# Patient Record
Sex: FEMALE | Race: OTHER | Employment: FULL TIME | URBAN - NONMETROPOLITAN AREA
[De-identification: names, ages, dates, MRNs, and addresses within clinical notes are randomized per-mention and may not be internally consistent; named-entity substitution may affect disease eponyms.]

---

## 2017-04-24 ENCOUNTER — OFFICE VISIT (OUTPATIENT)
Dept: FAMILY MEDICINE CLINIC | Age: 27
End: 2017-04-24
Payer: COMMERCIAL

## 2017-04-24 VITALS
WEIGHT: 293 LBS | DIASTOLIC BLOOD PRESSURE: 72 MMHG | HEART RATE: 78 BPM | SYSTOLIC BLOOD PRESSURE: 128 MMHG | HEIGHT: 67 IN | BODY MASS INDEX: 45.99 KG/M2

## 2017-04-24 VITALS
WEIGHT: 282 LBS | HEIGHT: 67 IN | SYSTOLIC BLOOD PRESSURE: 128 MMHG | BODY MASS INDEX: 44.26 KG/M2 | HEART RATE: 76 BPM | DIASTOLIC BLOOD PRESSURE: 70 MMHG

## 2017-04-24 DIAGNOSIS — N80.9 ENDOMETRIOSIS, SITE UNSPECIFIED: ICD-10-CM

## 2017-04-24 DIAGNOSIS — F41.9 ANXIETY: Primary | ICD-10-CM

## 2017-04-24 DIAGNOSIS — K21.9 GASTROESOPHAGEAL REFLUX DISEASE WITHOUT ESOPHAGITIS: ICD-10-CM

## 2017-04-24 DIAGNOSIS — E28.2 POLYCYSTIC OVARIES: ICD-10-CM

## 2017-04-24 PROBLEM — E66.09 OBESITY DUE TO EXCESS CALORIES: Status: ACTIVE | Noted: 2017-04-24

## 2017-04-24 PROBLEM — M54.50 LUMBAGO: Status: ACTIVE | Noted: 2017-04-24

## 2017-04-24 PROCEDURE — G8417 CALC BMI ABV UP PARAM F/U: HCPCS | Performed by: NURSE PRACTITIONER

## 2017-04-24 PROCEDURE — 99214 OFFICE O/P EST MOD 30 MIN: CPT | Performed by: NURSE PRACTITIONER

## 2017-04-24 PROCEDURE — G8427 DOCREV CUR MEDS BY ELIG CLIN: HCPCS | Performed by: NURSE PRACTITIONER

## 2017-04-24 PROCEDURE — 4004F PT TOBACCO SCREEN RCVD TLK: CPT | Performed by: NURSE PRACTITIONER

## 2017-04-24 RX ORDER — MECLOFENAMATE SODIUM 100 MG/1
100 CAPSULE ORAL PRN
COMMUNITY
Start: 2017-04-01 | End: 2019-08-27 | Stop reason: ALTCHOICE

## 2017-04-24 RX ORDER — HYDROCODONE BITARTRATE AND ACETAMINOPHEN 5; 325 MG/1; MG/1
TABLET ORAL
COMMUNITY
Start: 2017-03-08 | End: 2017-06-06

## 2017-04-24 RX ORDER — NAPROXEN 500 MG/1
500 TABLET ORAL 2 TIMES DAILY WITH MEALS
COMMUNITY
End: 2017-04-24 | Stop reason: ALTCHOICE

## 2017-04-24 RX ORDER — CYCLOBENZAPRINE HCL 10 MG
TABLET ORAL
COMMUNITY
Start: 2017-03-08 | End: 2017-04-24

## 2017-04-24 RX ORDER — CALCIUM CARBONATE 200(500)MG
1 TABLET,CHEWABLE ORAL 3 TIMES DAILY
COMMUNITY
End: 2017-06-06

## 2017-04-24 RX ORDER — DICYCLOMINE HCL 20 MG
TABLET ORAL
Status: ON HOLD | COMMUNITY
Start: 2017-02-27 | End: 2018-07-18 | Stop reason: HOSPADM

## 2017-04-24 RX ORDER — FLUOXETINE HYDROCHLORIDE 20 MG/1
20 CAPSULE ORAL DAILY
Qty: 30 CAPSULE | Refills: 1 | Status: SHIPPED | OUTPATIENT
Start: 2017-04-24 | End: 2017-07-05 | Stop reason: SDUPTHER

## 2017-04-24 RX ORDER — TIZANIDINE 4 MG/1
TABLET ORAL
COMMUNITY
Start: 2017-04-01 | End: 2017-09-20 | Stop reason: ALTCHOICE

## 2017-04-24 RX ORDER — OMEPRAZOLE 20 MG/1
20 CAPSULE, DELAYED RELEASE ORAL DAILY
Qty: 30 CAPSULE | Refills: 5 | Status: SHIPPED | OUTPATIENT
Start: 2017-04-24 | End: 2017-12-16 | Stop reason: SDUPTHER

## 2017-04-24 RX ORDER — LORATADINE 10 MG/1
10 TABLET ORAL DAILY
COMMUNITY
End: 2017-06-06 | Stop reason: SDUPTHER

## 2017-04-24 RX ORDER — BENZONATATE 100 MG/1
100 CAPSULE ORAL 3 TIMES DAILY PRN
COMMUNITY
End: 2017-06-06

## 2017-04-24 RX ORDER — MEGESTROL ACETATE 40 MG/1
40 TABLET ORAL PRN
COMMUNITY
Start: 2017-04-01 | End: 2019-08-27 | Stop reason: ALTCHOICE

## 2017-04-24 ASSESSMENT — ENCOUNTER SYMPTOMS
NAUSEA: 1
SHORTNESS OF BREATH: 0
WHEEZING: 0
DIARRHEA: 0
CONSTIPATION: 0
COUGH: 1

## 2017-06-06 ENCOUNTER — OFFICE VISIT (OUTPATIENT)
Dept: FAMILY MEDICINE CLINIC | Age: 27
End: 2017-06-06
Payer: COMMERCIAL

## 2017-06-06 VITALS
WEIGHT: 288 LBS | SYSTOLIC BLOOD PRESSURE: 126 MMHG | BODY MASS INDEX: 45.2 KG/M2 | HEART RATE: 88 BPM | HEIGHT: 67 IN | DIASTOLIC BLOOD PRESSURE: 80 MMHG

## 2017-06-06 DIAGNOSIS — R53.83 FATIGUE, UNSPECIFIED TYPE: ICD-10-CM

## 2017-06-06 DIAGNOSIS — F41.9 ANXIETY: Primary | ICD-10-CM

## 2017-06-06 DIAGNOSIS — K21.9 GASTROESOPHAGEAL REFLUX DISEASE WITHOUT ESOPHAGITIS: ICD-10-CM

## 2017-06-06 DIAGNOSIS — R63.1 POLYDIPSIA: ICD-10-CM

## 2017-06-06 DIAGNOSIS — R63.5 WEIGHT GAIN: ICD-10-CM

## 2017-06-06 DIAGNOSIS — G89.29 CHRONIC BILATERAL LOW BACK PAIN WITHOUT SCIATICA: ICD-10-CM

## 2017-06-06 DIAGNOSIS — M54.50 CHRONIC BILATERAL LOW BACK PAIN WITHOUT SCIATICA: ICD-10-CM

## 2017-06-06 LAB
BASOPHILS ABSOLUTE: 0.14 /ΜL
BASOPHILS RELATIVE PERCENT: 1.98 %
EOSINOPHILS ABSOLUTE: 0.37 /ΜL
EOSINOPHILS RELATIVE PERCENT: 5.12 %
HCT VFR BLD CALC: 47.6 % (ref 36–46)
HEMOGLOBIN: 15.8 G/DL (ref 12–16)
LYMPHOCYTES ABSOLUTE: 3.03 /ΜL
LYMPHOCYTES RELATIVE PERCENT: 42 %
MCH RBC QN AUTO: 31.3 PG
MCHC RBC AUTO-ENTMCNC: 33.3 G/DL
MCV RBC AUTO: 94.1 FL
MONOCYTES ABSOLUTE: 0.46 /ΜL
MONOCYTES RELATIVE PERCENT: 6.4 %
NEUTROPHILS ABSOLUTE: 3.21 /ΜL
NEUTROPHILS RELATIVE PERCENT: 44.5 %
PDW BLD-RTO: 12.3 %
PLATELET # BLD: 401 K/ΜL
PMV BLD AUTO: 6.4 FL
RBC # BLD: 5.06 10^6/ΜL
WBC # BLD: 7.22 10^3/ML

## 2017-06-06 PROCEDURE — 4004F PT TOBACCO SCREEN RCVD TLK: CPT | Performed by: NURSE PRACTITIONER

## 2017-06-06 PROCEDURE — G8427 DOCREV CUR MEDS BY ELIG CLIN: HCPCS | Performed by: NURSE PRACTITIONER

## 2017-06-06 PROCEDURE — G8417 CALC BMI ABV UP PARAM F/U: HCPCS | Performed by: NURSE PRACTITIONER

## 2017-06-06 PROCEDURE — 99214 OFFICE O/P EST MOD 30 MIN: CPT | Performed by: NURSE PRACTITIONER

## 2017-06-06 RX ORDER — BUSPIRONE HYDROCHLORIDE 7.5 MG/1
7.5 TABLET ORAL 2 TIMES DAILY
Qty: 60 TABLET | Refills: 1 | Status: SHIPPED | OUTPATIENT
Start: 2017-06-06 | End: 2017-10-30 | Stop reason: SDUPTHER

## 2017-06-06 RX ORDER — MONTELUKAST SODIUM 10 MG/1
10 TABLET ORAL NIGHTLY
COMMUNITY
End: 2017-06-06 | Stop reason: SDUPTHER

## 2017-06-06 RX ORDER — MONTELUKAST SODIUM 10 MG/1
10 TABLET ORAL NIGHTLY
Qty: 30 TABLET | Refills: 5 | Status: SHIPPED | OUTPATIENT
Start: 2017-06-06 | End: 2018-06-12 | Stop reason: SDUPTHER

## 2017-06-06 RX ORDER — LORATADINE 10 MG/1
10 TABLET ORAL DAILY
Qty: 30 TABLET | Refills: 5 | Status: SHIPPED | OUTPATIENT
Start: 2017-06-06 | End: 2018-06-11 | Stop reason: SDUPTHER

## 2017-06-06 RX ORDER — TOPIRAMATE 25 MG/1
25 TABLET ORAL DAILY
COMMUNITY
End: 2017-10-30 | Stop reason: DRUGHIGH

## 2017-06-06 ASSESSMENT — PATIENT HEALTH QUESTIONNAIRE - PHQ9
SUM OF ALL RESPONSES TO PHQ QUESTIONS 1-9: 2
2. FEELING DOWN, DEPRESSED OR HOPELESS: 1
1. LITTLE INTEREST OR PLEASURE IN DOING THINGS: 1
SUM OF ALL RESPONSES TO PHQ9 QUESTIONS 1 & 2: 2

## 2017-06-06 ASSESSMENT — ENCOUNTER SYMPTOMS
CHANGE IN BOWEL HABIT: 0
WHEEZING: 0
ABDOMINAL PAIN: 0
SHORTNESS OF BREATH: 0
DIARRHEA: 0
VOMITING: 0
VISUAL CHANGE: 0
SWOLLEN GLANDS: 0
COUGH: 0
CONSTIPATION: 0
SORE THROAT: 0

## 2017-06-08 DIAGNOSIS — R63.5 WEIGHT GAIN: ICD-10-CM

## 2017-06-08 DIAGNOSIS — R63.1 POLYDIPSIA: ICD-10-CM

## 2017-06-08 DIAGNOSIS — R53.83 FATIGUE, UNSPECIFIED TYPE: ICD-10-CM

## 2017-06-08 ASSESSMENT — ENCOUNTER SYMPTOMS
HEARTBURN: 1
NAUSEA: 1
HOARSE VOICE: 0
BELCHING: 0

## 2017-07-05 ENCOUNTER — OFFICE VISIT (OUTPATIENT)
Dept: FAMILY MEDICINE CLINIC | Age: 27
End: 2017-07-05
Payer: COMMERCIAL

## 2017-07-05 VITALS
BODY MASS INDEX: 44.6 KG/M2 | DIASTOLIC BLOOD PRESSURE: 64 MMHG | HEART RATE: 72 BPM | SYSTOLIC BLOOD PRESSURE: 118 MMHG | HEIGHT: 67 IN | WEIGHT: 284.13 LBS

## 2017-07-05 DIAGNOSIS — F41.8 DEPRESSION WITH ANXIETY: Primary | ICD-10-CM

## 2017-07-05 PROCEDURE — 99214 OFFICE O/P EST MOD 30 MIN: CPT | Performed by: NURSE PRACTITIONER

## 2017-07-05 PROCEDURE — G8417 CALC BMI ABV UP PARAM F/U: HCPCS | Performed by: NURSE PRACTITIONER

## 2017-07-05 PROCEDURE — G8427 DOCREV CUR MEDS BY ELIG CLIN: HCPCS | Performed by: NURSE PRACTITIONER

## 2017-07-05 PROCEDURE — 4004F PT TOBACCO SCREEN RCVD TLK: CPT | Performed by: NURSE PRACTITIONER

## 2017-07-05 RX ORDER — FLUOXETINE HYDROCHLORIDE 40 MG/1
40 CAPSULE ORAL DAILY
Qty: 30 CAPSULE | Refills: 2 | Status: SHIPPED | OUTPATIENT
Start: 2017-07-05 | End: 2017-11-13 | Stop reason: SDUPTHER

## 2017-07-05 ASSESSMENT — ENCOUNTER SYMPTOMS
CONSTIPATION: 0
DIARRHEA: 0
SHORTNESS OF BREATH: 0

## 2017-07-08 ASSESSMENT — ENCOUNTER SYMPTOMS
ABDOMINAL PAIN: 0
VISUAL CHANGE: 0

## 2017-07-18 ENCOUNTER — OFFICE VISIT (OUTPATIENT)
Dept: FAMILY MEDICINE CLINIC | Age: 27
End: 2017-07-18
Payer: COMMERCIAL

## 2017-07-18 VITALS
HEART RATE: 72 BPM | DIASTOLIC BLOOD PRESSURE: 64 MMHG | BODY MASS INDEX: 44.6 KG/M2 | HEIGHT: 67 IN | WEIGHT: 284.13 LBS | SYSTOLIC BLOOD PRESSURE: 122 MMHG

## 2017-07-18 DIAGNOSIS — E28.2 POLYCYSTIC OVARIES: ICD-10-CM

## 2017-07-18 DIAGNOSIS — F41.8 DEPRESSION WITH ANXIETY: ICD-10-CM

## 2017-07-18 DIAGNOSIS — N94.6 DYSMENORRHEA: ICD-10-CM

## 2017-07-18 DIAGNOSIS — N80.9 ENDOMETRIOSIS, SITE UNSPECIFIED: ICD-10-CM

## 2017-07-18 DIAGNOSIS — N93.8 DYSFUNCTIONAL UTERINE BLEEDING: Primary | ICD-10-CM

## 2017-07-18 PROCEDURE — G8417 CALC BMI ABV UP PARAM F/U: HCPCS | Performed by: NURSE PRACTITIONER

## 2017-07-18 PROCEDURE — G8427 DOCREV CUR MEDS BY ELIG CLIN: HCPCS | Performed by: NURSE PRACTITIONER

## 2017-07-18 PROCEDURE — 99213 OFFICE O/P EST LOW 20 MIN: CPT | Performed by: NURSE PRACTITIONER

## 2017-07-18 PROCEDURE — 4004F PT TOBACCO SCREEN RCVD TLK: CPT | Performed by: NURSE PRACTITIONER

## 2017-07-18 ASSESSMENT — ENCOUNTER SYMPTOMS
WHEEZING: 0
ABDOMINAL PAIN: 1
DIARRHEA: 1
COUGH: 0
VOMITING: 1
NAUSEA: 1
CONSTIPATION: 1
SHORTNESS OF BREATH: 0

## 2017-08-03 ENCOUNTER — TELEPHONE (OUTPATIENT)
Dept: FAMILY MEDICINE CLINIC | Age: 27
End: 2017-08-03

## 2017-09-20 ENCOUNTER — OFFICE VISIT (OUTPATIENT)
Dept: FAMILY MEDICINE CLINIC | Age: 27
End: 2017-09-20
Payer: COMMERCIAL

## 2017-09-20 VITALS
DIASTOLIC BLOOD PRESSURE: 86 MMHG | HEART RATE: 92 BPM | SYSTOLIC BLOOD PRESSURE: 124 MMHG | OXYGEN SATURATION: 98 % | RESPIRATION RATE: 16 BRPM | BODY MASS INDEX: 44.67 KG/M2 | WEIGHT: 284.6 LBS | HEIGHT: 67 IN | TEMPERATURE: 98.6 F

## 2017-09-20 DIAGNOSIS — J40 BRONCHITIS: Primary | ICD-10-CM

## 2017-09-20 PROCEDURE — 99213 OFFICE O/P EST LOW 20 MIN: CPT | Performed by: NURSE PRACTITIONER

## 2017-09-20 RX ORDER — PREDNISONE 20 MG/1
20 TABLET ORAL DAILY
Qty: 7 TABLET | Refills: 0 | Status: SHIPPED | OUTPATIENT
Start: 2017-09-20 | End: 2017-09-27

## 2017-09-20 RX ORDER — BENZONATATE 100 MG/1
100 CAPSULE ORAL 3 TIMES DAILY PRN
Qty: 21 CAPSULE | Refills: 1 | Status: SHIPPED | OUTPATIENT
Start: 2017-09-20 | End: 2018-02-14 | Stop reason: ALTCHOICE

## 2017-09-20 RX ORDER — CEPHALEXIN 500 MG/1
500 CAPSULE ORAL 3 TIMES DAILY
Qty: 30 CAPSULE | Refills: 0 | Status: SHIPPED | OUTPATIENT
Start: 2017-09-20 | End: 2017-09-30

## 2017-09-20 ASSESSMENT — ENCOUNTER SYMPTOMS
COUGH: 1
WHEEZING: 1
NAUSEA: 0
SINUS PAIN: 1
DIARRHEA: 0
RHINORRHEA: 1
ABDOMINAL PAIN: 0
SORE THROAT: 1
VOMITING: 1
SHORTNESS OF BREATH: 0

## 2017-10-30 ENCOUNTER — OFFICE VISIT (OUTPATIENT)
Dept: FAMILY MEDICINE CLINIC | Age: 27
End: 2017-10-30
Payer: COMMERCIAL

## 2017-10-30 VITALS
OXYGEN SATURATION: 98 % | HEIGHT: 67 IN | BODY MASS INDEX: 44.67 KG/M2 | WEIGHT: 284.61 LBS | SYSTOLIC BLOOD PRESSURE: 128 MMHG | DIASTOLIC BLOOD PRESSURE: 82 MMHG | RESPIRATION RATE: 16 BRPM | HEART RATE: 86 BPM

## 2017-10-30 DIAGNOSIS — F41.9 ANXIETY: ICD-10-CM

## 2017-10-30 DIAGNOSIS — M54.42 CHRONIC BILATERAL LOW BACK PAIN WITH LEFT-SIDED SCIATICA: Primary | ICD-10-CM

## 2017-10-30 DIAGNOSIS — M79.675 PAIN OF TOE OF LEFT FOOT: ICD-10-CM

## 2017-10-30 DIAGNOSIS — G89.29 CHRONIC BILATERAL LOW BACK PAIN WITH LEFT-SIDED SCIATICA: Primary | ICD-10-CM

## 2017-10-30 LAB
INR BLD: 1 RATIO
PT: 11.9 SEC
PTT: 25 SEC

## 2017-10-30 PROCEDURE — G8427 DOCREV CUR MEDS BY ELIG CLIN: HCPCS | Performed by: NURSE PRACTITIONER

## 2017-10-30 PROCEDURE — G8484 FLU IMMUNIZE NO ADMIN: HCPCS | Performed by: NURSE PRACTITIONER

## 2017-10-30 PROCEDURE — 99213 OFFICE O/P EST LOW 20 MIN: CPT | Performed by: NURSE PRACTITIONER

## 2017-10-30 PROCEDURE — G8417 CALC BMI ABV UP PARAM F/U: HCPCS | Performed by: NURSE PRACTITIONER

## 2017-10-30 PROCEDURE — 1036F TOBACCO NON-USER: CPT | Performed by: NURSE PRACTITIONER

## 2017-10-30 RX ORDER — LORAZEPAM 1 MG/1
1 TABLET ORAL DAILY
Refills: 0 | COMMUNITY
Start: 2017-10-14 | End: 2018-02-14 | Stop reason: ALTCHOICE

## 2017-10-30 RX ORDER — TOPIRAMATE 50 MG/1
1 TABLET, FILM COATED ORAL 2 TIMES DAILY
Refills: 0 | COMMUNITY
Start: 2017-10-14 | End: 2018-09-12 | Stop reason: SDUPTHER

## 2017-10-30 RX ORDER — TIZANIDINE 4 MG/1
1 TABLET ORAL 2 TIMES DAILY
Refills: 0 | COMMUNITY
Start: 2017-10-14 | End: 2020-04-09 | Stop reason: SDUPTHER

## 2017-10-30 RX ORDER — BUSPIRONE HYDROCHLORIDE 15 MG/1
15 TABLET ORAL 2 TIMES DAILY
Qty: 60 TABLET | Refills: 2 | Status: SHIPPED | OUTPATIENT
Start: 2017-10-30 | End: 2018-04-26

## 2017-10-30 ASSESSMENT — ENCOUNTER SYMPTOMS
EYES NEGATIVE: 1
COUGH: 0
WHEEZING: 0
ABDOMINAL PAIN: 0
CONSTIPATION: 0
SHORTNESS OF BREATH: 0
BACK PAIN: 1
DIARRHEA: 0

## 2017-10-30 NOTE — PATIENT INSTRUCTIONS

## 2017-11-02 NOTE — PROGRESS NOTES
Left message to return call.
tablet by mouth 2 times daily     Dispense:  60 tablet     Refill:  2      Return if symptoms worsen or fail to improve. Patient given educational materials - see patient instructions. Discussed use, benefit, and side effects of prescribed medications. All patient questions answered. Pt voiced understanding. Health Maintenance reviewed - refused flu vaccine. Instructed to continue current medications, diet and exercise. Patient agreed with treatment plan. Follow up as directed.      Electronically signed by Autumn Benavides CNP on 10/30/2017

## 2017-11-09 ENCOUNTER — OFFICE VISIT (OUTPATIENT)
Dept: FAMILY MEDICINE CLINIC | Age: 27
End: 2017-11-09
Payer: COMMERCIAL

## 2017-11-09 VITALS
DIASTOLIC BLOOD PRESSURE: 84 MMHG | WEIGHT: 280 LBS | HEART RATE: 100 BPM | OXYGEN SATURATION: 98 % | RESPIRATION RATE: 16 BRPM | TEMPERATURE: 99.7 F | BODY MASS INDEX: 43.84 KG/M2 | SYSTOLIC BLOOD PRESSURE: 126 MMHG

## 2017-11-09 DIAGNOSIS — J06.9 UPPER RESPIRATORY TRACT INFECTION, UNSPECIFIED TYPE: ICD-10-CM

## 2017-11-09 DIAGNOSIS — R05.3 COUGH, PERSISTENT: Primary | ICD-10-CM

## 2017-11-09 PROCEDURE — G8427 DOCREV CUR MEDS BY ELIG CLIN: HCPCS | Performed by: NURSE PRACTITIONER

## 2017-11-09 PROCEDURE — G8484 FLU IMMUNIZE NO ADMIN: HCPCS | Performed by: NURSE PRACTITIONER

## 2017-11-09 PROCEDURE — 1036F TOBACCO NON-USER: CPT | Performed by: NURSE PRACTITIONER

## 2017-11-09 PROCEDURE — G8417 CALC BMI ABV UP PARAM F/U: HCPCS | Performed by: NURSE PRACTITIONER

## 2017-11-09 PROCEDURE — 99213 OFFICE O/P EST LOW 20 MIN: CPT | Performed by: NURSE PRACTITIONER

## 2017-11-09 RX ORDER — AMOXICILLIN AND CLAVULANATE POTASSIUM 500; 125 MG/1; MG/1
1 TABLET, FILM COATED ORAL 3 TIMES DAILY
Refills: 0 | COMMUNITY
Start: 2017-11-01 | End: 2018-02-14 | Stop reason: ALTCHOICE

## 2017-11-09 RX ORDER — PHENAZOPYRIDINE HYDROCHLORIDE 200 MG/1
1 TABLET, FILM COATED ORAL 2 TIMES DAILY
Refills: 0 | COMMUNITY
Start: 2017-10-30 | End: 2018-02-14 | Stop reason: ALTCHOICE

## 2017-11-09 ASSESSMENT — ENCOUNTER SYMPTOMS
SORE THROAT: 0
SINUS PRESSURE: 0
HEARTBURN: 0
WHEEZING: 0
COUGH: 1
RHINORRHEA: 0
SHORTNESS OF BREATH: 0
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
## 2017-11-13 RX ORDER — FLUOXETINE HYDROCHLORIDE 40 MG/1
CAPSULE ORAL
Qty: 30 CAPSULE | Refills: 2 | Status: SHIPPED | OUTPATIENT
Start: 2017-11-13 | End: 2018-05-15

## 2017-11-13 NOTE — TELEPHONE ENCOUNTER
Kathryn Gastelum is calling to request a refill on the following medication(s):  Requested Prescriptions     Pending Prescriptions Disp Refills    FLUoxetine (PROZAC) 40 MG capsule [Pharmacy Med Name: FLUOXETINE HCL 40 MG CAPSULE] 30 capsule 2     Sig: take 1 capsule by mouth once daily       Last Visit Date (If Applicable):  52/2/0821    Next Visit Date:    Visit date not found

## 2017-12-18 RX ORDER — OMEPRAZOLE 20 MG/1
CAPSULE, DELAYED RELEASE ORAL
Qty: 30 CAPSULE | Refills: 5 | Status: SHIPPED | OUTPATIENT
Start: 2017-12-18 | End: 2018-10-11 | Stop reason: SDUPTHER

## 2017-12-18 NOTE — TELEPHONE ENCOUNTER
Carrie Meeks is calling to request a refill on the following medication(s):  Requested Prescriptions     Pending Prescriptions Disp Refills    omeprazole (PRILOSEC) 20 MG delayed release capsule [Pharmacy Med Name: OMEPRAZOLE DR 20 MG CAPSULE] 30 capsule 5     Sig: take 1 capsule by mouth once daily       Last Visit Date (If Applicable):  44/9/5308    Next Visit Date:    Visit date not found

## 2018-02-14 ENCOUNTER — OFFICE VISIT (OUTPATIENT)
Dept: FAMILY MEDICINE CLINIC | Age: 28
End: 2018-02-14
Payer: COMMERCIAL

## 2018-02-14 VITALS
RESPIRATION RATE: 12 BRPM | BODY MASS INDEX: 45.49 KG/M2 | HEART RATE: 65 BPM | OXYGEN SATURATION: 98 % | SYSTOLIC BLOOD PRESSURE: 124 MMHG | DIASTOLIC BLOOD PRESSURE: 82 MMHG | TEMPERATURE: 100.2 F | WEIGHT: 290.5 LBS

## 2018-02-14 DIAGNOSIS — L23.9 ALLERGIC CONTACT DERMATITIS, UNSPECIFIED TRIGGER: ICD-10-CM

## 2018-02-14 DIAGNOSIS — J02.9 SORE THROAT: ICD-10-CM

## 2018-02-14 DIAGNOSIS — H66.001 ACUTE SUPPURATIVE OTITIS MEDIA OF RIGHT EAR WITHOUT SPONTANEOUS RUPTURE OF TYMPANIC MEMBRANE, RECURRENCE NOT SPECIFIED: Primary | ICD-10-CM

## 2018-02-14 PROCEDURE — 99213 OFFICE O/P EST LOW 20 MIN: CPT | Performed by: NURSE PRACTITIONER

## 2018-02-14 PROCEDURE — G8427 DOCREV CUR MEDS BY ELIG CLIN: HCPCS | Performed by: NURSE PRACTITIONER

## 2018-02-14 PROCEDURE — G8417 CALC BMI ABV UP PARAM F/U: HCPCS | Performed by: NURSE PRACTITIONER

## 2018-02-14 PROCEDURE — 1036F TOBACCO NON-USER: CPT | Performed by: NURSE PRACTITIONER

## 2018-02-14 PROCEDURE — G8484 FLU IMMUNIZE NO ADMIN: HCPCS | Performed by: NURSE PRACTITIONER

## 2018-02-14 RX ORDER — TRIAMCINOLONE ACETONIDE 1 MG/G
CREAM TOPICAL
Qty: 1 TUBE | Refills: 1 | Status: SHIPPED | OUTPATIENT
Start: 2018-02-14 | End: 2018-04-26 | Stop reason: ALTCHOICE

## 2018-02-14 RX ORDER — AMOXICILLIN AND CLAVULANATE POTASSIUM 875; 125 MG/1; MG/1
1 TABLET, FILM COATED ORAL 2 TIMES DAILY
Qty: 20 TABLET | Refills: 0 | Status: SHIPPED | OUTPATIENT
Start: 2018-02-14 | End: 2018-02-24

## 2018-02-14 ASSESSMENT — ENCOUNTER SYMPTOMS
DIARRHEA: 1
COUGH: 0
VOMITING: 0
SHORTNESS OF BREATH: 0
NAUSEA: 0
RHINORRHEA: 0
SINUS PRESSURE: 1
SORE THROAT: 1
WHEEZING: 0

## 2018-02-14 NOTE — PROGRESS NOTES
[address] [place] [address]  Dept: [phone]  Dept Fax: [phone]      Joycelyn Santos is a 33 y.o. female who presents today for her medical conditions/complaints as noted below. Chief Complaint   Patient presents with    Rash     left upper arm started Wednesday    Pharyngitis     Symptoms started one week ago. Hurts to swallow    Other     denies fever and cough    Otalgia     left ear pain       HPI:     Pharyngitis   This is a new problem. The current episode started in the past 7 days (2 days ago). Associated symptoms include congestion, fatigue, a rash (right upper arm itches) and a sore throat. Pertinent negatives include no chest pain, chills, coughing, fever, myalgias, nausea or vomiting. Rash   This is a new problem. The current episode started in the past 7 days. The problem is unchanged. The affected locations include the right arm. The rash is characterized by dryness, itchiness and redness. She was exposed to nothing. Associated symptoms include congestion, diarrhea (1x a day, for 2 days.), fatigue and a sore throat. Pertinent negatives include no cough, fever, rhinorrhea, shortness of breath or vomiting. Past treatments include nothing. Current Outpatient Prescriptions   Medication Sig Dispense Refill    amoxicillin-clavulanate (AUGMENTIN) 875-125 MG per tablet Take 1 tablet by mouth 2 times daily for 10 days 20 tablet 0    triamcinolone (KENALOG) 0.1 % cream Apply topically 2 times daily.  1 Tube 1    omeprazole (PRILOSEC) 20 MG delayed release capsule take 1 capsule by mouth once daily 30 capsule 5    FLUoxetine (PROZAC) 40 MG capsule take 1 capsule by mouth once daily 30 capsule 2    tiZANidine (ZANAFLEX) 4 MG tablet Take 1 tablet by mouth 2 times daily Muscle spasms  0    topiramate (TOPAMAX) 50 MG tablet Take 1 tablet by mouth 2 times daily  0    busPIRone (BUSPAR) 15 MG tablet Take 1 tablet by dermatitis, unspecified trigger  triamcinolone (KENALOG) 0.1 % cream       Plan:     Return if symptoms worsen or fail to improve. Orders Placed This Encounter   Medications    amoxicillin-clavulanate (AUGMENTIN) 875-125 MG per tablet     Sig: Take 1 tablet by mouth 2 times daily for 10 days     Dispense:  20 tablet     Refill:  0    triamcinolone (KENALOG) 0.1 % cream     Sig: Apply topically 2 times daily. Dispense:  1 Tube     Refill:  1      Patient given educational materials - see patient instructions. Discussed use, benefit, and side effects of prescribed medications. Rest, increase fluids, warm liquids and honey for sore throat. Continue tylenol/ibuprofen as needed for fevers/discomfort. Monitor for worsening symptoms, call office with any concerns. All patient questions answered. Pt voiced understanding. Follow up as directed.      Electronically signed by Donte Smith CNP on 2/23/2018 at 9:36 AM

## 2018-02-23 ASSESSMENT — ENCOUNTER SYMPTOMS: TROUBLE SWALLOWING: 0

## 2018-03-19 ENCOUNTER — OFFICE VISIT (OUTPATIENT)
Dept: FAMILY MEDICINE CLINIC | Age: 28
End: 2018-03-19
Payer: COMMERCIAL

## 2018-03-19 VITALS
RESPIRATION RATE: 10 BRPM | WEIGHT: 293 LBS | SYSTOLIC BLOOD PRESSURE: 124 MMHG | HEART RATE: 76 BPM | BODY MASS INDEX: 45.88 KG/M2 | OXYGEN SATURATION: 98 % | TEMPERATURE: 99.6 F | DIASTOLIC BLOOD PRESSURE: 86 MMHG

## 2018-03-19 DIAGNOSIS — J06.9 VIRAL UPPER RESPIRATORY TRACT INFECTION: Primary | ICD-10-CM

## 2018-03-19 DIAGNOSIS — N92.6 MISSED MENSES: ICD-10-CM

## 2018-03-19 DIAGNOSIS — E28.2 POLYCYSTIC OVARIES: ICD-10-CM

## 2018-03-19 LAB — PREGNANCY, SERUM: NEGATIVE

## 2018-03-19 PROCEDURE — G8427 DOCREV CUR MEDS BY ELIG CLIN: HCPCS | Performed by: NURSE PRACTITIONER

## 2018-03-19 PROCEDURE — 99213 OFFICE O/P EST LOW 20 MIN: CPT | Performed by: NURSE PRACTITIONER

## 2018-03-19 PROCEDURE — G8417 CALC BMI ABV UP PARAM F/U: HCPCS | Performed by: NURSE PRACTITIONER

## 2018-03-19 PROCEDURE — G8484 FLU IMMUNIZE NO ADMIN: HCPCS | Performed by: NURSE PRACTITIONER

## 2018-03-19 PROCEDURE — 1036F TOBACCO NON-USER: CPT | Performed by: NURSE PRACTITIONER

## 2018-03-19 ASSESSMENT — ENCOUNTER SYMPTOMS
SINUS COMPLAINT: 1
CONSTIPATION: 0
SORE THROAT: 1
VOMITING: 1
NAUSEA: 1
SINUS PRESSURE: 1
DIARRHEA: 0

## 2018-03-22 NOTE — PROGRESS NOTES
Patient made aware.
ear pain (\"pressure\" bilaterally), sinus pressure (maxillary), sneezing and sore throat. Respiratory: Negative for cough, shortness of breath and wheezing. Cardiovascular: Negative for chest pain and palpitations. Gastrointestinal: Positive for nausea and vomiting. Negative for constipation and diarrhea. GERD   Genitourinary: Positive for frequency. Negative for dysuria, hematuria and urgency. Musculoskeletal: Positive for myalgias. Objective:     /86   Pulse 76   Temp 99.6 °F (37.6 °C) (Tympanic)   Resp 10   Wt 293 lb (132.9 kg)   SpO2 98%   BMI 45.88 kg/m²     Physical Exam   Constitutional: She is oriented to person, place, and time. She appears well-developed and well-nourished. HENT:   Head: Normocephalic. Right Ear: Tympanic membrane normal.   Left Ear: Tympanic membrane normal.   Nose: Mucosal edema and sinus tenderness present. Right sinus exhibits maxillary sinus tenderness. Left sinus exhibits maxillary sinus tenderness. Mouth/Throat: Posterior oropharyngeal erythema present. Eyes: Conjunctivae are normal.   Neck: Neck supple. Cardiovascular: Normal rate, regular rhythm and normal heart sounds. Pulmonary/Chest: Effort normal and breath sounds normal. No respiratory distress. Abdominal: Soft. Bowel sounds are normal. There is no tenderness. There is no CVA tenderness. Lymphadenopathy:     She has no cervical adenopathy. Neurological: She is alert and oriented to person, place, and time. Assessment:     1. Viral upper respiratory tract infection     2. Missed menses  CANCELED: HCG Qualitative, Serum   3. Polycystic ovaries         Plan:     Return if symptoms worsen or fail to improve. Orders Placed This Encounter   Procedures    Pregnancy, serum qualitative     Rest, increase fluids, tylenol as needed for fevers/discomfort. Monitor for worsening symptoms, call office with any concerns. All patient questions answered.  Patient voiced

## 2018-03-25 ASSESSMENT — ENCOUNTER SYMPTOMS
COUGH: 0
WHEEZING: 0
SHORTNESS OF BREATH: 0

## 2018-04-10 ENCOUNTER — OFFICE VISIT (OUTPATIENT)
Dept: FAMILY MEDICINE CLINIC | Age: 28
End: 2018-04-10
Payer: COMMERCIAL

## 2018-04-10 VITALS
DIASTOLIC BLOOD PRESSURE: 82 MMHG | OXYGEN SATURATION: 98 % | BODY MASS INDEX: 45.99 KG/M2 | TEMPERATURE: 100.4 F | RESPIRATION RATE: 10 BRPM | WEIGHT: 293 LBS | SYSTOLIC BLOOD PRESSURE: 116 MMHG | HEART RATE: 88 BPM

## 2018-04-10 DIAGNOSIS — H65.493 CHRONIC MIDDLE EAR EFFUSION, BILATERAL: ICD-10-CM

## 2018-04-10 DIAGNOSIS — H66.92 ACUTE OTITIS MEDIA, LEFT: Primary | ICD-10-CM

## 2018-04-10 PROCEDURE — 1036F TOBACCO NON-USER: CPT | Performed by: NURSE PRACTITIONER

## 2018-04-10 PROCEDURE — 99213 OFFICE O/P EST LOW 20 MIN: CPT | Performed by: NURSE PRACTITIONER

## 2018-04-10 PROCEDURE — G8427 DOCREV CUR MEDS BY ELIG CLIN: HCPCS | Performed by: NURSE PRACTITIONER

## 2018-04-10 PROCEDURE — G8417 CALC BMI ABV UP PARAM F/U: HCPCS | Performed by: NURSE PRACTITIONER

## 2018-04-10 RX ORDER — AMOXICILLIN 875 MG/1
875 TABLET, COATED ORAL 2 TIMES DAILY
Qty: 20 TABLET | Refills: 0 | Status: SHIPPED | OUTPATIENT
Start: 2018-04-10 | End: 2018-04-20

## 2018-04-10 ASSESSMENT — ENCOUNTER SYMPTOMS
WHEEZING: 0
SINUS COMPLAINT: 1
HOARSE VOICE: 0
DIARRHEA: 1
SWOLLEN GLANDS: 0
SHORTNESS OF BREATH: 0
ABDOMINAL PAIN: 0
RHINORRHEA: 1
COUGH: 1
NAUSEA: 0
SINUS PRESSURE: 1
SORE THROAT: 0

## 2018-04-13 ENCOUNTER — TELEPHONE (OUTPATIENT)
Dept: PRIMARY CARE CLINIC | Age: 28
End: 2018-04-13

## 2018-04-13 ENCOUNTER — APPOINTMENT (OUTPATIENT)
Dept: CT IMAGING | Age: 28
End: 2018-04-13
Payer: COMMERCIAL

## 2018-04-13 ENCOUNTER — HOSPITAL ENCOUNTER (EMERGENCY)
Age: 28
Discharge: HOME | End: 2018-04-13
Attending: EMERGENCY MEDICINE
Payer: COMMERCIAL

## 2018-04-13 VITALS
BODY MASS INDEX: 45.88 KG/M2 | RESPIRATION RATE: 16 BRPM | SYSTOLIC BLOOD PRESSURE: 109 MMHG | WEIGHT: 293 LBS | OXYGEN SATURATION: 97 % | HEART RATE: 77 BPM | TEMPERATURE: 98.1 F | DIASTOLIC BLOOD PRESSURE: 63 MMHG

## 2018-04-13 DIAGNOSIS — R19.7 NAUSEA VOMITING AND DIARRHEA: ICD-10-CM

## 2018-04-13 DIAGNOSIS — R11.2 NAUSEA VOMITING AND DIARRHEA: ICD-10-CM

## 2018-04-13 DIAGNOSIS — R10.10 PAIN OF UPPER ABDOMEN: Primary | ICD-10-CM

## 2018-04-13 LAB
-: ABNORMAL
ABSOLUTE EOS #: 0.3 K/UL (ref 0–0.4)
ABSOLUTE IMMATURE GRANULOCYTE: NORMAL K/UL (ref 0–0.3)
ABSOLUTE LYMPH #: 3.1 K/UL (ref 1–4.8)
ABSOLUTE MONO #: 0.7 K/UL (ref 0.1–1.2)
ALBUMIN SERPL-MCNC: 4.3 G/DL (ref 3.5–5.2)
ALBUMIN/GLOBULIN RATIO: 1.7 (ref 1–2.5)
ALP BLD-CCNC: 74 U/L (ref 35–104)
ALT SERPL-CCNC: 18 U/L (ref 5–33)
AMORPHOUS: ABNORMAL
AMYLASE: 90 U/L (ref 28–100)
ANION GAP SERPL CALCULATED.3IONS-SCNC: 13 MMOL/L (ref 9–17)
AST SERPL-CCNC: 13 U/L
BACTERIA: ABNORMAL
BASOPHILS # BLD: 0 % (ref 0–1)
BASOPHILS ABSOLUTE: 0 K/UL (ref 0–0.2)
BILIRUB SERPL-MCNC: 0.18 MG/DL (ref 0.3–1.2)
BILIRUBIN URINE: NEGATIVE
BUN BLDV-MCNC: 11 MG/DL (ref 6–20)
BUN/CREAT BLD: 21 (ref 9–20)
CALCIUM SERPL-MCNC: 9.2 MG/DL (ref 8.6–10.4)
CASTS UA: ABNORMAL /LPF (ref 0–2)
CHLORIDE BLD-SCNC: 103 MMOL/L (ref 98–107)
CO2: 22 MMOL/L (ref 20–31)
COLOR: ABNORMAL
COMMENT UA: ABNORMAL
CREAT SERPL-MCNC: 0.52 MG/DL (ref 0.5–0.9)
CRYSTALS, UA: ABNORMAL /HPF
DIFFERENTIAL TYPE: NORMAL
EOSINOPHILS RELATIVE PERCENT: 4 % (ref 1–7)
EPITHELIAL CELLS UA: ABNORMAL /HPF (ref 0–5)
GFR AFRICAN AMERICAN: >60 ML/MIN
GFR NON-AFRICAN AMERICAN: >60 ML/MIN
GFR SERPL CREATININE-BSD FRML MDRD: ABNORMAL ML/MIN/{1.73_M2}
GFR SERPL CREATININE-BSD FRML MDRD: ABNORMAL ML/MIN/{1.73_M2}
GLUCOSE BLD-MCNC: 89 MG/DL (ref 70–99)
GLUCOSE URINE: NEGATIVE
HCG QUALITATIVE: NEGATIVE
HCT VFR BLD CALC: 40.2 % (ref 36–46)
HEMOGLOBIN: 13.7 G/DL (ref 12–16)
IMMATURE GRANULOCYTES: NORMAL %
KETONES, URINE: NEGATIVE
LEUKOCYTE ESTERASE, URINE: NEGATIVE
LIPASE: 22 U/L (ref 13–60)
LYMPHOCYTES # BLD: 34 % (ref 16–46)
MCH RBC QN AUTO: 30.6 PG (ref 26–34)
MCHC RBC AUTO-ENTMCNC: 34.1 G/DL (ref 31–37)
MCV RBC AUTO: 89.7 FL (ref 80–100)
MONOCYTES # BLD: 7 % (ref 4–11)
MUCUS: ABNORMAL
NITRITE, URINE: NEGATIVE
NRBC AUTOMATED: NORMAL PER 100 WBC
OTHER OBSERVATIONS UA: ABNORMAL
PDW BLD-RTO: 14.3 % (ref 11–14.5)
PH UA: 6.5 (ref 5–6)
PLATELET # BLD: 397 K/UL (ref 140–450)
PLATELET ESTIMATE: NORMAL
PMV BLD AUTO: 7.1 FL (ref 6–12)
POTASSIUM SERPL-SCNC: 4 MMOL/L (ref 3.7–5.3)
PROTEIN UA: NEGATIVE
RBC # BLD: 4.48 M/UL (ref 4–5.2)
RBC # BLD: NORMAL 10*6/UL
RBC UA: ABNORMAL /HPF (ref 0–4)
RENAL EPITHELIAL, UA: ABNORMAL /HPF
SEG NEUTROPHILS: 55 % (ref 43–77)
SEGMENTED NEUTROPHILS ABSOLUTE COUNT: 5.1 K/UL (ref 1.8–7.7)
SODIUM BLD-SCNC: 138 MMOL/L (ref 135–144)
SPECIFIC GRAVITY UA: 1.02 (ref 1.01–1.02)
TOTAL PROTEIN: 6.9 G/DL (ref 6.4–8.3)
TRICHOMONAS: ABNORMAL
TURBIDITY: ABNORMAL
URINE HGB: NEGATIVE
UROBILINOGEN, URINE: NORMAL
WBC # BLD: 9.2 K/UL (ref 3.5–11)
WBC # BLD: NORMAL 10*3/UL
WBC UA: ABNORMAL /HPF (ref 0–4)
YEAST: ABNORMAL

## 2018-04-13 PROCEDURE — 87186 SC STD MICRODIL/AGAR DIL: CPT

## 2018-04-13 PROCEDURE — 87077 CULTURE AEROBIC IDENTIFY: CPT

## 2018-04-13 PROCEDURE — 2580000003 HC RX 258: Performed by: EMERGENCY MEDICINE

## 2018-04-13 PROCEDURE — 96374 THER/PROPH/DIAG INJ IV PUSH: CPT

## 2018-04-13 PROCEDURE — 74177 CT ABD & PELVIS W/CONTRAST: CPT

## 2018-04-13 PROCEDURE — 6370000000 HC RX 637 (ALT 250 FOR IP): Performed by: EMERGENCY MEDICINE

## 2018-04-13 PROCEDURE — 6360000002 HC RX W HCPCS: Performed by: EMERGENCY MEDICINE

## 2018-04-13 PROCEDURE — 99284 EMERGENCY DEPT VISIT MOD MDM: CPT

## 2018-04-13 PROCEDURE — 80053 COMPREHEN METABOLIC PANEL: CPT

## 2018-04-13 PROCEDURE — 81001 URINALYSIS AUTO W/SCOPE: CPT

## 2018-04-13 PROCEDURE — 82150 ASSAY OF AMYLASE: CPT

## 2018-04-13 PROCEDURE — 96375 TX/PRO/DX INJ NEW DRUG ADDON: CPT

## 2018-04-13 PROCEDURE — 84703 CHORIONIC GONADOTROPIN ASSAY: CPT

## 2018-04-13 PROCEDURE — 36415 COLL VENOUS BLD VENIPUNCTURE: CPT

## 2018-04-13 PROCEDURE — 87086 URINE CULTURE/COLONY COUNT: CPT

## 2018-04-13 PROCEDURE — 6360000004 HC RX CONTRAST MEDICATION: Performed by: EMERGENCY MEDICINE

## 2018-04-13 PROCEDURE — 83690 ASSAY OF LIPASE: CPT

## 2018-04-13 PROCEDURE — 85025 COMPLETE CBC W/AUTO DIFF WBC: CPT

## 2018-04-13 RX ORDER — ONDANSETRON 2 MG/ML
4 INJECTION INTRAMUSCULAR; INTRAVENOUS ONCE
Status: COMPLETED | OUTPATIENT
Start: 2018-04-13 | End: 2018-04-13

## 2018-04-13 RX ORDER — HYDROCODONE BITARTRATE AND ACETAMINOPHEN 5; 325 MG/1; MG/1
2 TABLET ORAL ONCE
Status: COMPLETED | OUTPATIENT
Start: 2018-04-13 | End: 2018-04-13

## 2018-04-13 RX ORDER — ONDANSETRON 4 MG/1
4 TABLET, ORALLY DISINTEGRATING ORAL EVERY 8 HOURS PRN
Qty: 20 TABLET | Refills: 0 | Status: SHIPPED | OUTPATIENT
Start: 2018-04-13 | End: 2019-02-21 | Stop reason: SDUPTHER

## 2018-04-13 RX ORDER — KETOROLAC TROMETHAMINE 30 MG/ML
30 INJECTION, SOLUTION INTRAMUSCULAR; INTRAVENOUS ONCE
Status: COMPLETED | OUTPATIENT
Start: 2018-04-13 | End: 2018-04-13

## 2018-04-13 RX ORDER — 0.9 % SODIUM CHLORIDE 0.9 %
1000 INTRAVENOUS SOLUTION INTRAVENOUS ONCE
Status: COMPLETED | OUTPATIENT
Start: 2018-04-13 | End: 2018-04-13

## 2018-04-13 RX ORDER — HYDROCODONE BITARTRATE AND ACETAMINOPHEN 5; 325 MG/1; MG/1
1 TABLET ORAL EVERY 6 HOURS PRN
Qty: 10 TABLET | Refills: 0 | Status: SHIPPED | OUTPATIENT
Start: 2018-04-13 | End: 2018-04-20

## 2018-04-13 RX ADMIN — HYDROCODONE BITARTRATE AND ACETAMINOPHEN 2 TABLET: 5; 325 TABLET ORAL at 20:07

## 2018-04-13 RX ADMIN — SODIUM CHLORIDE 1000 ML: 9 INJECTION, SOLUTION INTRAVENOUS at 18:19

## 2018-04-13 RX ADMIN — KETOROLAC TROMETHAMINE 30 MG: 30 INJECTION, SOLUTION INTRAMUSCULAR at 18:20

## 2018-04-13 RX ADMIN — ONDANSETRON 4 MG: 2 INJECTION INTRAMUSCULAR; INTRAVENOUS at 18:19

## 2018-04-13 RX ADMIN — IOPAMIDOL 100 ML: 755 INJECTION, SOLUTION INTRAVENOUS at 18:56

## 2018-04-13 ASSESSMENT — ENCOUNTER SYMPTOMS
SHORTNESS OF BREATH: 0
VOMITING: 0
BACK PAIN: 0
COUGH: 0
DIARRHEA: 1
NAUSEA: 1
ABDOMINAL PAIN: 1
BLOOD IN STOOL: 0
CONSTIPATION: 0
EYE PAIN: 0

## 2018-04-13 ASSESSMENT — PAIN SCALES - GENERAL
PAINLEVEL_OUTOF10: 4
PAINLEVEL_OUTOF10: 6
PAINLEVEL_OUTOF10: 6
PAINLEVEL_OUTOF10: 5
PAINLEVEL_OUTOF10: 5

## 2018-04-13 ASSESSMENT — PAIN DESCRIPTION - DESCRIPTORS: DESCRIPTORS: ACHING

## 2018-04-13 ASSESSMENT — PAIN DESCRIPTION - ORIENTATION: ORIENTATION: RIGHT;LEFT;UPPER

## 2018-04-13 ASSESSMENT — PAIN DESCRIPTION - LOCATION: LOCATION: ABDOMEN

## 2018-04-15 LAB
CULTURE: ABNORMAL
CULTURE: ABNORMAL
Lab: ABNORMAL
ORGANISM: ABNORMAL
SPECIMEN DESCRIPTION: ABNORMAL
SPECIMEN DESCRIPTION: ABNORMAL
STATUS: ABNORMAL

## 2018-04-19 ENCOUNTER — TELEPHONE (OUTPATIENT)
Dept: FAMILY MEDICINE CLINIC | Age: 28
End: 2018-04-19

## 2018-04-26 ENCOUNTER — OFFICE VISIT (OUTPATIENT)
Dept: FAMILY MEDICINE CLINIC | Age: 28
End: 2018-04-26
Payer: COMMERCIAL

## 2018-04-26 VITALS
WEIGHT: 293 LBS | HEIGHT: 67 IN | SYSTOLIC BLOOD PRESSURE: 116 MMHG | HEART RATE: 82 BPM | RESPIRATION RATE: 14 BRPM | DIASTOLIC BLOOD PRESSURE: 84 MMHG | BODY MASS INDEX: 45.99 KG/M2

## 2018-04-26 DIAGNOSIS — R19.7 DIARRHEA, UNSPECIFIED TYPE: Primary | ICD-10-CM

## 2018-04-26 PROCEDURE — 99214 OFFICE O/P EST MOD 30 MIN: CPT | Performed by: NURSE PRACTITIONER

## 2018-04-26 PROCEDURE — 1036F TOBACCO NON-USER: CPT | Performed by: NURSE PRACTITIONER

## 2018-04-26 PROCEDURE — G8427 DOCREV CUR MEDS BY ELIG CLIN: HCPCS | Performed by: NURSE PRACTITIONER

## 2018-04-26 PROCEDURE — G8417 CALC BMI ABV UP PARAM F/U: HCPCS | Performed by: NURSE PRACTITIONER

## 2018-04-26 ASSESSMENT — ENCOUNTER SYMPTOMS
WHEEZING: 0
ABDOMINAL PAIN: 1
SHORTNESS OF BREATH: 0
COUGH: 0
SORE THROAT: 0
FLATUS: 0
BLOATING: 1
RHINORRHEA: 0
DIARRHEA: 1
VOMITING: 1

## 2018-04-26 ASSESSMENT — PATIENT HEALTH QUESTIONNAIRE - PHQ9
1. LITTLE INTEREST OR PLEASURE IN DOING THINGS: 1
2. FEELING DOWN, DEPRESSED OR HOPELESS: 0
SUM OF ALL RESPONSES TO PHQ QUESTIONS 1-9: 1
SUM OF ALL RESPONSES TO PHQ9 QUESTIONS 1 & 2: 1

## 2018-05-15 ENCOUNTER — OFFICE VISIT (OUTPATIENT)
Dept: FAMILY MEDICINE CLINIC | Age: 28
End: 2018-05-15
Payer: COMMERCIAL

## 2018-05-15 VITALS
HEART RATE: 76 BPM | TEMPERATURE: 100.6 F | WEIGHT: 293 LBS | OXYGEN SATURATION: 99 % | SYSTOLIC BLOOD PRESSURE: 134 MMHG | RESPIRATION RATE: 14 BRPM | DIASTOLIC BLOOD PRESSURE: 86 MMHG | BODY MASS INDEX: 46.57 KG/M2

## 2018-05-15 DIAGNOSIS — J02.9 SORE THROAT: Primary | ICD-10-CM

## 2018-05-15 LAB
C DIFF TOXIN: NORMAL
CULTURE, STOOL: NORMAL
MISCELLANEOUS LAB TEST RESULT: NORMAL
S PYO AG THROAT QL: NORMAL

## 2018-05-15 PROCEDURE — G8417 CALC BMI ABV UP PARAM F/U: HCPCS | Performed by: NURSE PRACTITIONER

## 2018-05-15 PROCEDURE — G8428 CUR MEDS NOT DOCUMENT: HCPCS | Performed by: NURSE PRACTITIONER

## 2018-05-15 PROCEDURE — 99213 OFFICE O/P EST LOW 20 MIN: CPT | Performed by: NURSE PRACTITIONER

## 2018-05-15 PROCEDURE — 87880 STREP A ASSAY W/OPTIC: CPT | Performed by: NURSE PRACTITIONER

## 2018-05-15 PROCEDURE — 1036F TOBACCO NON-USER: CPT | Performed by: NURSE PRACTITIONER

## 2018-05-15 ASSESSMENT — ENCOUNTER SYMPTOMS
EYES NEGATIVE: 1
ABDOMINAL PAIN: 1
COUGH: 0
SINUS PRESSURE: 0
SHORTNESS OF BREATH: 0
DIARRHEA: 1
SORE THROAT: 1
NAUSEA: 1
CHANGE IN BOWEL HABIT: 0
VOMITING: 0
RHINORRHEA: 0
VOICE CHANGE: 1
CONSTIPATION: 0
WHEEZING: 0

## 2018-06-04 ENCOUNTER — OFFICE VISIT (OUTPATIENT)
Dept: GASTROENTEROLOGY | Age: 28
End: 2018-06-04
Payer: COMMERCIAL

## 2018-06-04 VITALS
BODY MASS INDEX: 45.94 KG/M2 | WEIGHT: 293 LBS | DIASTOLIC BLOOD PRESSURE: 66 MMHG | HEART RATE: 68 BPM | SYSTOLIC BLOOD PRESSURE: 93 MMHG

## 2018-06-04 DIAGNOSIS — R11.2 NAUSEA AND VOMITING, INTRACTABILITY OF VOMITING NOT SPECIFIED, UNSPECIFIED VOMITING TYPE: ICD-10-CM

## 2018-06-04 DIAGNOSIS — R10.84 GENERALIZED ABDOMINAL PAIN: ICD-10-CM

## 2018-06-04 DIAGNOSIS — R19.7 DIARRHEA, UNSPECIFIED TYPE: Primary | ICD-10-CM

## 2018-06-04 DIAGNOSIS — E66.01 MORBID OBESITY (HCC): ICD-10-CM

## 2018-06-04 PROBLEM — R10.9 ABDOMINAL PAIN: Status: ACTIVE | Noted: 2018-06-04

## 2018-06-04 PROCEDURE — G8427 DOCREV CUR MEDS BY ELIG CLIN: HCPCS | Performed by: INTERNAL MEDICINE

## 2018-06-04 PROCEDURE — G8417 CALC BMI ABV UP PARAM F/U: HCPCS | Performed by: INTERNAL MEDICINE

## 2018-06-04 PROCEDURE — 99244 OFF/OP CNSLTJ NEW/EST MOD 40: CPT | Performed by: INTERNAL MEDICINE

## 2018-06-04 RX ORDER — DULOXETIN HYDROCHLORIDE 30 MG/1
1 CAPSULE, DELAYED RELEASE ORAL DAILY
Refills: 0 | COMMUNITY
Start: 2018-05-31 | End: 2018-10-29 | Stop reason: SDUPTHER

## 2018-06-04 ASSESSMENT — ENCOUNTER SYMPTOMS
ANAL BLEEDING: 0
TROUBLE SWALLOWING: 0
NAUSEA: 1
BACK PAIN: 1
BLOOD IN STOOL: 0
RECTAL PAIN: 0
DIARRHEA: 1
VOMITING: 1
ABDOMINAL PAIN: 1
COUGH: 1
CONSTIPATION: 0
ABDOMINAL DISTENTION: 1

## 2018-06-05 RX ORDER — POLYETHYLENE GLYCOL 3350 17 G/17G
POWDER, FOR SOLUTION ORAL
Qty: 255 G | Refills: 0 | Status: SHIPPED | OUTPATIENT
Start: 2018-06-05 | End: 2018-07-04

## 2018-06-06 ENCOUNTER — TELEPHONE (OUTPATIENT)
Dept: FAMILY MEDICINE CLINIC | Age: 28
End: 2018-06-06

## 2018-06-07 ENCOUNTER — OFFICE VISIT (OUTPATIENT)
Dept: FAMILY MEDICINE CLINIC | Age: 28
End: 2018-06-07
Payer: COMMERCIAL

## 2018-06-07 VITALS
BODY MASS INDEX: 45.41 KG/M2 | SYSTOLIC BLOOD PRESSURE: 124 MMHG | DIASTOLIC BLOOD PRESSURE: 86 MMHG | WEIGHT: 290 LBS | HEART RATE: 88 BPM

## 2018-06-07 DIAGNOSIS — J20.9 ACUTE BRONCHITIS DUE TO INFECTION: Primary | ICD-10-CM

## 2018-06-07 PROCEDURE — G8417 CALC BMI ABV UP PARAM F/U: HCPCS | Performed by: FAMILY MEDICINE

## 2018-06-07 PROCEDURE — 99213 OFFICE O/P EST LOW 20 MIN: CPT | Performed by: FAMILY MEDICINE

## 2018-06-07 PROCEDURE — 1036F TOBACCO NON-USER: CPT | Performed by: FAMILY MEDICINE

## 2018-06-07 PROCEDURE — G8427 DOCREV CUR MEDS BY ELIG CLIN: HCPCS | Performed by: FAMILY MEDICINE

## 2018-06-07 RX ORDER — PREDNISONE 20 MG/1
TABLET ORAL
Qty: 15 TABLET | Refills: 0 | Status: SHIPPED | OUTPATIENT
Start: 2018-06-07 | End: 2018-07-12 | Stop reason: ALTCHOICE

## 2018-06-07 RX ORDER — CEFDINIR 300 MG/1
300 CAPSULE ORAL 2 TIMES DAILY
Qty: 20 CAPSULE | Refills: 0 | Status: SHIPPED | OUTPATIENT
Start: 2018-06-07 | End: 2018-06-17

## 2018-06-07 ASSESSMENT — ENCOUNTER SYMPTOMS
ABDOMINAL PAIN: 0
SHORTNESS OF BREATH: 1
COUGH: 1
SORE THROAT: 0
WHEEZING: 0
SINUS PAIN: 0

## 2018-06-12 ENCOUNTER — OFFICE VISIT (OUTPATIENT)
Dept: FAMILY MEDICINE CLINIC | Age: 28
End: 2018-06-12
Payer: COMMERCIAL

## 2018-06-12 VITALS
OXYGEN SATURATION: 98 % | TEMPERATURE: 99.3 F | SYSTOLIC BLOOD PRESSURE: 132 MMHG | DIASTOLIC BLOOD PRESSURE: 86 MMHG | WEIGHT: 289.4 LBS | HEART RATE: 94 BPM | BODY MASS INDEX: 45.32 KG/M2

## 2018-06-12 DIAGNOSIS — R05.9 COUGH: ICD-10-CM

## 2018-06-12 DIAGNOSIS — J40 BRONCHITIS: Primary | ICD-10-CM

## 2018-06-12 PROCEDURE — 1036F TOBACCO NON-USER: CPT | Performed by: NURSE PRACTITIONER

## 2018-06-12 PROCEDURE — G8427 DOCREV CUR MEDS BY ELIG CLIN: HCPCS | Performed by: NURSE PRACTITIONER

## 2018-06-12 PROCEDURE — G8417 CALC BMI ABV UP PARAM F/U: HCPCS | Performed by: NURSE PRACTITIONER

## 2018-06-12 PROCEDURE — 99213 OFFICE O/P EST LOW 20 MIN: CPT | Performed by: NURSE PRACTITIONER

## 2018-06-12 RX ORDER — LORATADINE 10 MG/1
TABLET ORAL
Qty: 30 TABLET | Refills: 5 | Status: SHIPPED | OUTPATIENT
Start: 2018-06-12 | End: 2019-01-19 | Stop reason: SDUPTHER

## 2018-06-12 RX ORDER — MONTELUKAST SODIUM 10 MG/1
10 TABLET ORAL NIGHTLY
Qty: 30 TABLET | Refills: 5 | Status: SHIPPED | OUTPATIENT
Start: 2018-06-12 | End: 2019-04-09 | Stop reason: SDUPTHER

## 2018-06-12 RX ORDER — CODEINE PHOSPHATE AND GUAIFENESIN 10; 100 MG/5ML; MG/5ML
5 SOLUTION ORAL 3 TIMES DAILY PRN
Qty: 420 ML | Refills: 0 | Status: SHIPPED | OUTPATIENT
Start: 2018-06-12 | End: 2018-06-17

## 2018-06-12 ASSESSMENT — ENCOUNTER SYMPTOMS
RHINORRHEA: 1
TROUBLE SWALLOWING: 0
COUGH: 1
VOMITING: 1
SHORTNESS OF BREATH: 1
DIARRHEA: 0
NAUSEA: 0
WHEEZING: 1
SINUS PRESSURE: 1
ABDOMINAL PAIN: 0
CONSTIPATION: 0
SORE THROAT: 0

## 2018-06-15 ENCOUNTER — INITIAL CONSULT (OUTPATIENT)
Dept: SURGERY | Age: 28
End: 2018-06-15
Payer: COMMERCIAL

## 2018-06-15 VITALS
RESPIRATION RATE: 16 BRPM | BODY MASS INDEX: 45.04 KG/M2 | SYSTOLIC BLOOD PRESSURE: 120 MMHG | TEMPERATURE: 98.3 F | DIASTOLIC BLOOD PRESSURE: 76 MMHG | HEART RATE: 100 BPM | HEIGHT: 67 IN | WEIGHT: 287 LBS

## 2018-06-15 DIAGNOSIS — R19.7 DIARRHEA, UNSPECIFIED TYPE: ICD-10-CM

## 2018-06-15 DIAGNOSIS — R19.4 ENCOUNTER FOR DIAGNOSTIC COLONOSCOPY DUE TO CHANGE IN BOWEL HABITS: ICD-10-CM

## 2018-06-15 DIAGNOSIS — K21.00 REFLUX ESOPHAGITIS: Primary | ICD-10-CM

## 2018-06-15 DIAGNOSIS — R10.13 EPIGASTRIC PAIN: ICD-10-CM

## 2018-06-15 PROCEDURE — G8417 CALC BMI ABV UP PARAM F/U: HCPCS | Performed by: SURGERY

## 2018-06-15 PROCEDURE — 99204 OFFICE O/P NEW MOD 45 MIN: CPT | Performed by: SURGERY

## 2018-06-15 PROCEDURE — 1036F TOBACCO NON-USER: CPT | Performed by: SURGERY

## 2018-06-15 PROCEDURE — G8427 DOCREV CUR MEDS BY ELIG CLIN: HCPCS | Performed by: SURGERY

## 2018-06-25 ENCOUNTER — TELEPHONE (OUTPATIENT)
Dept: GASTROENTEROLOGY | Age: 28
End: 2018-06-25

## 2018-07-12 ENCOUNTER — OFFICE VISIT (OUTPATIENT)
Dept: FAMILY MEDICINE CLINIC | Age: 28
End: 2018-07-12
Payer: COMMERCIAL

## 2018-07-12 VITALS
WEIGHT: 286 LBS | BODY MASS INDEX: 44.79 KG/M2 | RESPIRATION RATE: 16 BRPM | HEART RATE: 94 BPM | SYSTOLIC BLOOD PRESSURE: 114 MMHG | OXYGEN SATURATION: 98 % | TEMPERATURE: 98.6 F | DIASTOLIC BLOOD PRESSURE: 78 MMHG

## 2018-07-12 DIAGNOSIS — J30.9 CHRONIC ALLERGIC RHINITIS, UNSPECIFIED SEASONALITY, UNSPECIFIED TRIGGER: ICD-10-CM

## 2018-07-12 DIAGNOSIS — J39.2 THROAT IRRITATION: ICD-10-CM

## 2018-07-12 DIAGNOSIS — J02.9 ACUTE VIRAL PHARYNGITIS: Primary | ICD-10-CM

## 2018-07-12 LAB — S PYO AG THROAT QL: NORMAL

## 2018-07-12 PROCEDURE — 1036F TOBACCO NON-USER: CPT | Performed by: NURSE PRACTITIONER

## 2018-07-12 PROCEDURE — 99213 OFFICE O/P EST LOW 20 MIN: CPT | Performed by: NURSE PRACTITIONER

## 2018-07-12 PROCEDURE — G8417 CALC BMI ABV UP PARAM F/U: HCPCS | Performed by: NURSE PRACTITIONER

## 2018-07-12 PROCEDURE — 87880 STREP A ASSAY W/OPTIC: CPT | Performed by: NURSE PRACTITIONER

## 2018-07-12 PROCEDURE — G8427 DOCREV CUR MEDS BY ELIG CLIN: HCPCS | Performed by: NURSE PRACTITIONER

## 2018-07-12 ASSESSMENT — ENCOUNTER SYMPTOMS
RHINORRHEA: 0
SWOLLEN GLANDS: 1
SORE THROAT: 1
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
## 2018-07-12 NOTE — PATIENT INSTRUCTIONS
hold dust, from your bedroom. · If you are allergic to house dust and mites, do not use home humidifiers. Your doctor can suggest ways you can control dust and mites. · Look for signs of cockroaches. Cockroaches cause allergic reactions. Use cockroach baits to get rid of them. Then, clean your home well. Cockroaches like areas where grocery bags, newspapers, empty bottles, or cardboard boxes are stored. Do not keep these inside your home, and keep trash and food containers sealed. Seal off any spots where cockroaches might enter your home. · If you are allergic to mold, get rid of furniture, rugs, and drapes that smell musty. Check for mold in the bathroom. · If you are allergic to outdoor pollen or mold spores, use air-conditioning. Change or clean all filters every month. Keep windows closed. · If you are allergic to pollen, stay inside when pollen counts are high. Use a vacuum  with a HEPA filter or a double-thickness filter at least two times each week. · Stay inside when air pollution is bad. Avoid paint fumes, perfumes, and other strong odors. · Avoid conditions that make your allergies worse. Stay away from smoke. Do not smoke or let anyone else smoke in your house. Do not use fireplaces or wood-burning stoves. · If you are allergic to your pets, change the air filter in your furnace every month. Use high-efficiency filters. · If you are allergic to pet dander, keep pets outside or out of your bedroom. Old carpet and cloth furniture can hold a lot of animal dander. You may need to replace them. When should you call for help? Give an epinephrine shot if:    · You think you are having a severe allergic reaction.     · You have symptoms in more than one body area, such as mild nausea and an itchy mouth.    After giving an epinephrine shot call 911, even if you feel better.   Call 911 if:    · You have symptoms of a severe allergic reaction.  These may include:  ¨ Sudden raised, red areas (hives) to make yourself more comfortable. Follow-up care is a key part of your treatment and safety. Be sure to make and go to all appointments, and call your doctor if you are having problems. It's also a good idea to know your test results and keep a list of the medicines you take. How can you care for yourself at home? · Get plenty of rest if you feel tired. · Take an over-the-counter pain medicine if needed, such as acetaminophen (Tylenol), ibuprofen (Advil, Motrin), or naproxen (Aleve). Read and follow all instructions on the label. · Be careful when taking over-the-counter cold or flu medicines and Tylenol at the same time. Many of these medicines have acetaminophen, which is Tylenol. Read the labels to make sure that you are not taking more than the recommended dose. Too much acetaminophen (Tylenol) can be harmful. · Drink plenty of fluids, enough so that your urine is light yellow or clear like water. If you have kidney, heart, or liver disease and have to limit fluids, talk with your doctor before you increase the amount of fluids you drink. · Stay home from work, school, and other public places while you have a fever. When should you call for help? Call 911 anytime you think you may need emergency care. For example, call if:    · You have severe trouble breathing.     · You passed out (lost consciousness).    Call your doctor now or seek immediate medical care if:    · You seem to be getting much sicker.     · You have a new or higher fever.     · You have blood in your stools.     · You have new belly pain, or your pain gets worse.     · You have a new rash.    Watch closely for changes in your health, and be sure to contact your doctor if:    · You start to get better and then get worse.     · You do not get better as expected. Where can you learn more? Go to [email or web address] and sign in to your [employer] account.  Enter N725 in the [employer] box to learn more
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
## 2018-07-18 ENCOUNTER — ANESTHESIA EVENT (OUTPATIENT)
Dept: OPERATING ROOM | Age: 28
End: 2018-07-18
Payer: COMMERCIAL

## 2018-07-18 ENCOUNTER — ANESTHESIA (OUTPATIENT)
Dept: OPERATING ROOM | Age: 28
End: 2018-07-18
Payer: COMMERCIAL

## 2018-07-18 ENCOUNTER — HOSPITAL ENCOUNTER (OUTPATIENT)
Age: 28
Setting detail: OUTPATIENT SURGERY
Discharge: HOME | End: 2018-07-18
Attending: SURGERY | Admitting: SURGERY
Payer: COMMERCIAL

## 2018-07-18 VITALS
WEIGHT: 286.6 LBS | HEIGHT: 67 IN | HEART RATE: 70 BPM | TEMPERATURE: 97.1 F | SYSTOLIC BLOOD PRESSURE: 112 MMHG | OXYGEN SATURATION: 94 % | RESPIRATION RATE: 16 BRPM | DIASTOLIC BLOOD PRESSURE: 78 MMHG | BODY MASS INDEX: 44.98 KG/M2

## 2018-07-18 VITALS — DIASTOLIC BLOOD PRESSURE: 57 MMHG | SYSTOLIC BLOOD PRESSURE: 114 MMHG | OXYGEN SATURATION: 97 %

## 2018-07-18 PROCEDURE — 3700000001 HC ADD 15 MINUTES (ANESTHESIA): Performed by: SURGERY

## 2018-07-18 PROCEDURE — 3609017100 HC EGD: Performed by: SURGERY

## 2018-07-18 PROCEDURE — 7100000010 HC PHASE II RECOVERY - FIRST 15 MIN: Performed by: SURGERY

## 2018-07-18 PROCEDURE — 45380 COLONOSCOPY AND BIOPSY: CPT | Performed by: SURGERY

## 2018-07-18 PROCEDURE — 3700000000 HC ANESTHESIA ATTENDED CARE: Performed by: SURGERY

## 2018-07-18 PROCEDURE — 3609010300 HC COLONOSCOPY W/BIOPSY SINGLE/MULTIPLE: Performed by: SURGERY

## 2018-07-18 PROCEDURE — 2709999900 HC NON-CHARGEABLE SUPPLY: Performed by: SURGERY

## 2018-07-18 PROCEDURE — 7100000011 HC PHASE II RECOVERY - ADDTL 15 MIN: Performed by: SURGERY

## 2018-07-18 PROCEDURE — 2500000003 HC RX 250 WO HCPCS: Performed by: NURSE ANESTHETIST, CERTIFIED REGISTERED

## 2018-07-18 PROCEDURE — 6360000002 HC RX W HCPCS: Performed by: NURSE ANESTHETIST, CERTIFIED REGISTERED

## 2018-07-18 PROCEDURE — 43235 EGD DIAGNOSTIC BRUSH WASH: CPT | Performed by: SURGERY

## 2018-07-18 PROCEDURE — 82947 ASSAY GLUCOSE BLOOD QUANT: CPT

## 2018-07-18 PROCEDURE — 88305 TISSUE EXAM BY PATHOLOGIST: CPT

## 2018-07-18 PROCEDURE — 00813 ANES UPR LWR GI NDSC PX: CPT | Performed by: NURSE ANESTHETIST, CERTIFIED REGISTERED

## 2018-07-18 PROCEDURE — 2580000003 HC RX 258: Performed by: SURGERY

## 2018-07-18 RX ORDER — SODIUM CHLORIDE, SODIUM LACTATE, POTASSIUM CHLORIDE, CALCIUM CHLORIDE 600; 310; 30; 20 MG/100ML; MG/100ML; MG/100ML; MG/100ML
INJECTION, SOLUTION INTRAVENOUS CONTINUOUS
Status: DISCONTINUED | OUTPATIENT
Start: 2018-07-18 | End: 2018-07-18 | Stop reason: HOSPADM

## 2018-07-18 RX ORDER — PROPOFOL 10 MG/ML
INJECTION, EMULSION INTRAVENOUS PRN
Status: DISCONTINUED | OUTPATIENT
Start: 2018-07-18 | End: 2018-07-18 | Stop reason: SDUPTHER

## 2018-07-18 RX ORDER — GLYCOPYRROLATE 1 MG/5 ML
SYRINGE (ML) INTRAVENOUS PRN
Status: DISCONTINUED | OUTPATIENT
Start: 2018-07-18 | End: 2018-07-18 | Stop reason: SDUPTHER

## 2018-07-18 RX ORDER — LIDOCAINE HYDROCHLORIDE 20 MG/ML
INJECTION, SOLUTION INFILTRATION; PERINEURAL PRN
Status: DISCONTINUED | OUTPATIENT
Start: 2018-07-18 | End: 2018-07-18 | Stop reason: SDUPTHER

## 2018-07-18 RX ADMIN — Medication 0.1 MG: at 10:12

## 2018-07-18 RX ADMIN — SODIUM CHLORIDE, POTASSIUM CHLORIDE, SODIUM LACTATE AND CALCIUM CHLORIDE: 600; 310; 30; 20 INJECTION, SOLUTION INTRAVENOUS at 09:03

## 2018-07-18 RX ADMIN — LIDOCAINE HYDROCHLORIDE 60 MG: 20 INJECTION, SOLUTION INFILTRATION; PERINEURAL at 10:15

## 2018-07-18 RX ADMIN — PROPOFOL 400 MG: 10 INJECTION, EMULSION INTRAVENOUS at 10:15

## 2018-07-18 ASSESSMENT — PAIN SCALES - GENERAL
PAINLEVEL_OUTOF10: 0
PAINLEVEL_OUTOF10: 0

## 2018-07-18 ASSESSMENT — PAIN - FUNCTIONAL ASSESSMENT: PAIN_FUNCTIONAL_ASSESSMENT: 0-10

## 2018-07-18 NOTE — BRIEF OP NOTE
Brief Postoperative Note  ______________________________________________________________    Patient: Mary Chandler  YOB: 1990  MRN: [id number]  Date of Procedure: 7/18/2018    Pre-Op Diagnosis: nausea vomiting diarrhea abdomianl pain     Post-Op Diagnosis: Hiatal hernia, polyp rectosigmoid, internal hemorrhoids       Procedure(s):  EGD  COLONOSCOPY WITH BIOPSY    Anesthesia: Monitor Anesthesia Care, TIVA     Anesthetist: MELISA De Luna    Surgeon(s):  Yfn Sagastume MD    Staff:  Scrub Person First: Zaheer Prater MA  Scrub Person Second: Estrella Tom     Estimated Blood Loss: * No values recorded between 7/18/2018 10:09 AM and 7/18/2018 10:39 AM * None    Complications: None    Specimens:   ID Type Source Tests Collected by Time Destination   A : BX RECTO SIGMOID POLYP Tissue Colon SURGICAL PATHOLOGY Yfn Sagastume MD 7/18/2018 1035        Implants:  * No implants in log *      Drains:      Findings: The EGD findings: The gastroesophageal junction was encountered at 38 cm from the incisors line and there is a moderate size sliding hiatal hernia without esophagitis. However there are a few minute islands of Elliott's epithelium just above the gastroesophageal junction. There is no inflammation and the esophagus stomach or the duodenum. The colonoscopy findings: The colon was well prepared and examined for the anus into the distal 1 foot of ileum. The distal ileum appeared normal and the because of colon appeared completely normal except for a half a centimeter elevated polyp at the rectosigmoid area approximately 20 cm from the dentate line. The polyp was removed using the biopsy forceps. There are multiple internal hemorrhoids none of which is complicated.     Rainer Cash MD  Date: 7/18/2018  Time: 10:41 AM
reports    Jaylen Chinchilla MD  Date: 7/18/2018  Time: 10:41 AM

## 2018-07-18 NOTE — OP NOTE
Patient: Osmel Cisneros  YOB: 1990  MRN: [id number]  Date of Procedure: 7/18/2018    Pre-Op Diagnosis: nausea vomiting diarrhea abdomianl pain     Post-Op Diagnosis: Hiatal hernia, polyp rectosigmoid, internal hemorrhoids       Procedure(s):  EGD  COLONOSCOPY WITH BIOPSY    Anesthesia: Monitor Anesthesia Care, TIVA     Anesthetist: MELISA Dunham    Surgeon(s):  Eugene Armando MD    Staff:  Scrub Person First: Juan Carlos Frazier MA  Scrub Person Second: Anshul Rodriguez     Estimated Blood Loss: * No values recorded between 7/18/2018 10:09 AM and 7/18/2018 10:39 AM * None    Complications: None    Specimens:   ID Type Source Tests Collected by Time Destination   A : BX RECTO SIGMOID POLYP Tissue Colon SURGICAL PATHOLOGY Eugene Armando MD 7/18/2018 1035        Implants:  * No implants in log *      Drains:      Findings: The EGD findings: The gastroesophageal junction was encountered at 38 cm from the incisors line and there is a moderate size sliding hiatal hernia without esophagitis. However there are a few minute islands of Elliott's epithelium just above the gastroesophageal junction. There is no inflammation and the esophagus stomach or the duodenum. The colonoscopy findings: The colon was well prepared and examined for the anus into the distal 1 foot of ileum. The distal ileum appeared normal and the because of colon appeared completely normal except for a half a centimeter elevated polyp at the rectosigmoid area approximately 20 cm from the dentate line. The polyp was removed using the biopsy forceps. There are multiple internal hemorrhoids none of which is complicated. The procedure: The patient was given satisfactory IV sedation and monitored anesthesia care and placed in the left lateral position. The Olympus video gastroscope was introduced into the mouth and easily passed with esophagus stomach first and second portions of the duodenum.   The above findings were

## 2018-07-18 NOTE — PROGRESS NOTES
Discharge instructions given to patient and grandmother. Questions answered. Verbalized understanding.

## 2018-07-18 NOTE — ANESTHESIA PRE PROCEDURE
Department of Anesthesiology  Preprocedure Note       Name:  Irasema Dailey   Age:  33 y.o.  :  1990                                          MRN:  [id number]         Date:  2018      Surgeon: Anastasiya Torres):  Momo Ness MD    Procedure: Procedure(s):  EGD  COLONOSCOPY    Medications prior to admission:   Prior to Admission medications    Medication Sig Start Date End Date Taking?  Authorizing Provider   Probiotic Product (PROBIOTIC DAILY PO) Take by mouth   Yes Historical Provider, MD   Psyllium (METAMUCIL FIBER PO) Take by mouth daily   Yes Historical Provider, MD   bisacodyl (DULCOLAX) 5 MG EC tablet Take 1 tablet by mouth daily as needed for Constipation 6/15/18  Yes Alexandro Gerardo MD   loratadine (CLARITIN) 10 MG tablet take 1 tablet by mouth once daily 18  Yes SUE James CNP   montelukast (SINGULAIR) 10 MG tablet Take 1 tablet by mouth nightly 18  Yes SUE James CNP   DULoxetine (CYMBALTA) 30 MG extended release capsule 1 capsule daily 18  Yes Historical Provider, MD   norgestrel-ethinyl estradiol (CRYSELLE-28) 0.3-30 MG-MCG per tablet Take 1 tablet by mouth daily   Yes Historical Provider, MD   omeprazole (PRILOSEC) 20 MG delayed release capsule take 1 capsule by mouth once daily 17  Yes SUE James CNP   tiZANidine (ZANAFLEX) 4 MG tablet Take 1 tablet by mouth 2 times daily Muscle spasms 10/14/17  Yes Historical Provider, MD   topiramate (TOPAMAX) 50 MG tablet Take 1 tablet by mouth 2 times daily 10/14/17  Yes Historical Provider, MD   megestrol (MEGACE) 40 MG tablet  17  Yes Historical Provider, MD   dicyclomine (BENTYL) 20 MG tablet  17  Yes Historical Provider, MD   meclofenamate (MECLOMEN) 100 MG capsule  17  Yes Historical Provider, MD   ondansetron (ZOFRAN ODT) 4 MG disintegrating tablet Take 1 tablet by mouth every 8 hours as needed for Nausea 18   Lizzie Lerma MD   aspirin-acetaminophen-caffeine ([place] °C (97.1 °F)   TempSrc: Temporal   SpO2: 96%   Weight: 286 lb 9.6 oz (130 kg)   Height: 5' 7\" (1.702 m)                                              BP Readings from Last 3 Encounters:   07/18/18 127/82   07/12/18 114/78   06/15/18 120/76       NPO Status: Time of last liquid consumption: 2330                        Time of last solid consumption: 0600                        Date of last liquid consumption: 07/17/18                        Date of last solid food consumption: 07/17/18    BMI:   Wt Readings from Last 3 Encounters:   07/18/18 286 lb 9.6 oz (130 kg)   07/12/18 286 lb (129.7 kg)   06/15/18 287 lb (130.2 kg)     Body mass index is 44.89 kg/m². CBC:   Lab Results   Component Value Date    WBC 9.2 04/13/2018    RBC 4.48 04/13/2018    RBC 0-2 03/26/2018    HGB 13.7 04/13/2018    HCT 40.2 04/13/2018    MCV 89.7 04/13/2018    RDW 14.3 04/13/2018     04/13/2018       CMP:   Lab Results   Component Value Date     04/13/2018    K 4.0 04/13/2018     04/13/2018    CO2 22 04/13/2018    BUN 11 04/13/2018    CREATININE 0.52 04/13/2018    GFRAA >60 04/13/2018    AGRATIO 1.7 03/26/2018    LABGLOM >60 04/13/2018    GLUCOSE 89 04/13/2018    GLUCOSE neg 03/26/2018    PROT 6.9 04/13/2018    CALCIUM 9.2 04/13/2018    BILITOT 0.18 04/13/2018    BILITOT NEGATIVE 03/26/2018    ALKPHOS 74 04/13/2018    AST 13 04/13/2018    ALT 18 04/13/2018       POC Tests: No results for input(s): POCGLU, POCNA, POCK, POCCL, POCBUN, POCHEMO, POCHCT in the last 72 hours.     Coags:   Lab Results   Component Value Date    PROTIME 11.9 10/30/2017    INR 1.0 10/30/2017       HCG (If Applicable):   Lab Results   Component Value Date    PREGTESTUR NEGATIVE 07/22/2017    PREGSERUM NEGATIVE 03/19/2018        ABGs: No results found for: PHART, PO2ART, DCQ4UIM, SEL9EKY, BEART, N5MMGRMV     Type & Screen (If Applicable):  No results found for: TATE [facility]    Anesthesia Evaluation  Patient summary reviewed and Nursing notes reviewed

## 2018-07-19 LAB
GLUCOSE BLD-MCNC: 79 MG/DL (ref 65–105)
SURGICAL PATHOLOGY REPORT: NORMAL

## 2018-07-30 ENCOUNTER — TELEPHONE (OUTPATIENT)
Dept: SURGERY | Age: 28
End: 2018-07-30

## 2018-07-30 NOTE — TELEPHONE ENCOUNTER
Patient was called and given results and recommendations from her colonoscopy and EGD, patient voiced understanding and all questions answered.

## 2018-08-07 ENCOUNTER — OFFICE VISIT (OUTPATIENT)
Dept: PRIMARY CARE CLINIC | Age: 28
End: 2018-08-07
Payer: COMMERCIAL

## 2018-08-07 VITALS
TEMPERATURE: 98.9 F | HEIGHT: 67 IN | DIASTOLIC BLOOD PRESSURE: 89 MMHG | WEIGHT: 293 LBS | BODY MASS INDEX: 45.99 KG/M2 | OXYGEN SATURATION: 98 % | SYSTOLIC BLOOD PRESSURE: 138 MMHG | HEART RATE: 102 BPM

## 2018-08-07 DIAGNOSIS — R03.0 ELEVATED BLOOD PRESSURE READING: Primary | ICD-10-CM

## 2018-08-07 PROCEDURE — G8427 DOCREV CUR MEDS BY ELIG CLIN: HCPCS | Performed by: FAMILY MEDICINE

## 2018-08-07 PROCEDURE — 1036F TOBACCO NON-USER: CPT | Performed by: FAMILY MEDICINE

## 2018-08-07 PROCEDURE — G8417 CALC BMI ABV UP PARAM F/U: HCPCS | Performed by: FAMILY MEDICINE

## 2018-08-07 PROCEDURE — 99213 OFFICE O/P EST LOW 20 MIN: CPT | Performed by: FAMILY MEDICINE

## 2018-08-07 ASSESSMENT — ENCOUNTER SYMPTOMS
RHINORRHEA: 0
TROUBLE SWALLOWING: 0
EYE REDNESS: 0
SINUS PRESSURE: 0
ABDOMINAL PAIN: 0
SHORTNESS OF BREATH: 0
DIARRHEA: 0
CONSTIPATION: 0
EYE DISCHARGE: 0
SORE THROAT: 0
NAUSEA: 0
VOMITING: 0
COUGH: 0
WHEEZING: 0

## 2018-08-07 NOTE — PROGRESS NOTES
Subjective:      Patient ID: Giuliano Botello is a 33 y.o. female. Hypertension   This is a new problem. The current episode started today (went to chiropractors office today for a disability exam and states that her blood pressure was elevated). The problem is unchanged. Pertinent negatives include no chest pain, headaches, neck pain, palpitations, peripheral edema or shortness of breath. (Patient has no known history of HTN. Was told at chiropractor office that this was stroke and heart attack level blood pressure. They wanted to call an ambulance, but she came here instead. No secondary symptoms. Does have chronic back pain and her pain is a little worse today) Past treatments include nothing. Did review patient's med list, allergies, social history,pmhx and pshx today as noted in the record. Review of Systems   Constitutional: Negative for chills, fatigue and fever. HENT: Negative for congestion, ear pain, postnasal drip, rhinorrhea, sinus pressure, sore throat and trouble swallowing. Eyes: Negative for discharge and redness. Respiratory: Negative for cough, shortness of breath and wheezing. Cardiovascular: Negative for chest pain and palpitations. Gastrointestinal: Negative for abdominal pain, constipation, diarrhea, nausea and vomiting. Musculoskeletal: Negative for arthralgias, myalgias and neck pain. Skin: Negative for rash and wound. Allergic/Immunologic: Negative for environmental allergies. Neurological: Negative for dizziness, weakness, light-headedness and headaches. Hematological: Negative for adenopathy. Psychiatric/Behavioral: Negative. Objective:   Physical Exam   Constitutional: She is oriented to person, place, and time. She appears well-developed and well-nourished. No distress. HENT:   Head: Normocephalic and atraumatic.    Right Ear: External ear normal.   Left Ear: External ear normal.   Nose: Nose normal.   Mouth/Throat: Oropharynx is clear and

## 2018-09-12 ENCOUNTER — OFFICE VISIT (OUTPATIENT)
Dept: FAMILY MEDICINE CLINIC | Age: 28
End: 2018-09-12
Payer: COMMERCIAL

## 2018-09-12 VITALS
DIASTOLIC BLOOD PRESSURE: 86 MMHG | BODY MASS INDEX: 46.2 KG/M2 | SYSTOLIC BLOOD PRESSURE: 134 MMHG | WEIGHT: 293 LBS | RESPIRATION RATE: 12 BRPM | HEART RATE: 88 BPM | OXYGEN SATURATION: 98 %

## 2018-09-12 DIAGNOSIS — N39.0 RECURRENT UTI: ICD-10-CM

## 2018-09-12 DIAGNOSIS — G44.229 CHRONIC TENSION-TYPE HEADACHE, NOT INTRACTABLE: Primary | ICD-10-CM

## 2018-09-12 LAB
APPEARANCE FLUID: ABNORMAL
BILIRUBIN, POC: ABNORMAL
BLOOD URINE, POC: ABNORMAL
CLARITY, POC: CLEAR
COLOR, POC: ABNORMAL
GLUCOSE URINE, POC: ABNORMAL
KETONES, POC: ABNORMAL
LEUKOCYTE EST, POC: ABNORMAL
NITRITE, POC: ABNORMAL
PH, POC: 5
PROTEIN, POC: ABNORMAL
SPECIFIC GRAVITY, POC: 1.02
URINE CULTURE, ROUTINE: NORMAL
UROBILINOGEN, POC: ABNORMAL

## 2018-09-12 PROCEDURE — G8417 CALC BMI ABV UP PARAM F/U: HCPCS | Performed by: NURSE PRACTITIONER

## 2018-09-12 PROCEDURE — 81002 URINALYSIS NONAUTO W/O SCOPE: CPT | Performed by: NURSE PRACTITIONER

## 2018-09-12 PROCEDURE — 1036F TOBACCO NON-USER: CPT | Performed by: NURSE PRACTITIONER

## 2018-09-12 PROCEDURE — G8427 DOCREV CUR MEDS BY ELIG CLIN: HCPCS | Performed by: NURSE PRACTITIONER

## 2018-09-12 PROCEDURE — 99214 OFFICE O/P EST MOD 30 MIN: CPT | Performed by: NURSE PRACTITIONER

## 2018-09-12 RX ORDER — TOPIRAMATE 50 MG/1
50 TABLET, FILM COATED ORAL 2 TIMES DAILY
Qty: 60 TABLET | Refills: 2 | Status: SHIPPED | OUTPATIENT
Start: 2018-09-12 | End: 2019-05-09 | Stop reason: SDUPTHER

## 2018-09-12 RX ORDER — CEPHALEXIN 500 MG/1
500 CAPSULE ORAL 3 TIMES DAILY
Qty: 21 CAPSULE | Refills: 0 | Status: SHIPPED | OUTPATIENT
Start: 2018-09-12 | End: 2018-09-19

## 2018-09-12 ASSESSMENT — ENCOUNTER SYMPTOMS
VOMITING: 1
SHORTNESS OF BREATH: 0
PHOTOPHOBIA: 1
ABDOMINAL PAIN: 0
NAUSEA: 1
COUGH: 0
DIARRHEA: 0
CONSTIPATION: 0
WHEEZING: 0

## 2018-09-12 NOTE — PROGRESS NOTES
Right Ear: Tympanic membrane normal.   Left Ear: Tympanic membrane normal.   Mouth/Throat: Oropharynx is clear and moist.   Eyes: Pupils are equal, round, and reactive to light. Conjunctivae and EOM are normal.   Neck: Neck supple. No thyromegaly present. Cardiovascular: Normal rate, regular rhythm and normal heart sounds. Pulmonary/Chest: Effort normal and breath sounds normal.   Abdominal: Soft. Bowel sounds are normal. There is tenderness in the suprapubic area. There is no CVA tenderness. Lymphadenopathy:     She has no cervical adenopathy. Neurological: She is alert and oriented to person, place, and time. She has normal strength. Psychiatric: She has a normal mood and affect. Assessment:     1. Chronic tension-type headache, not intractable    2. Recurrent UTI         Results for POC orders placed in visit on 09/12/18   POCT Urinalysis no Micro   Result Value Ref Range    Color, UA amarilis yellow     Clarity, UA clear     Glucose, UA POC neg     Bilirubin, UA neg     Ketones, UA neg     Spec Grav, UA 1.025     Blood, UA POC trace     pH, UA 5     Protein, UA POC neg     Urobilinogen, UA neg     Leukocytes, UA small     Nitrite, UA neg     Appearance, Fluid  Clear, Slightly Cloudy       Plan:     Orders Placed This Encounter   Procedures    Urine Culture     Order Specific Question:   Specify (ex-cath, midstream, cysto, etc)? Answer:   midstream    Urine Culture    POCT Urinalysis no Micro       Orders Placed This Encounter   Medications    topiramate (TOPAMAX) 50 MG tablet     Sig: Take 1 tablet by mouth 2 times daily     Dispense:  60 tablet     Refill:  2    cephALEXin (KEFLEX) 500 MG capsule     Sig: Take 1 capsule by mouth 3 times daily for 7 days     Dispense:  21 capsule     Refill:  0      Return if symptoms worsen or fail to improve. Reviewed urine culture results, will extend keflex treatment for UTI. Instructed to increase fluids.      Prophylactic therapy for headache: topiramate due to high frequency of pain. Avoid alcohol, caffeine, smoking, stressful situations as much as possible and the use of illicit or street drugs. Discussed use, benefit, and side effects of prescribed medications. All patient questions answered. Pt voiced understanding. Health Maintenance reviewed. Instructed to continue current medications, diet and exercise. Patient agreed with treatment plan. Follow up as directed.      Electronically signed by SUE Rosa CNP on 9/21/2018

## 2018-09-21 PROBLEM — G44.229 CHRONIC TENSION-TYPE HEADACHE, NOT INTRACTABLE: Status: ACTIVE | Noted: 2018-09-21

## 2018-10-11 ENCOUNTER — OFFICE VISIT (OUTPATIENT)
Dept: FAMILY MEDICINE CLINIC | Age: 28
End: 2018-10-11
Payer: COMMERCIAL

## 2018-10-11 VITALS
SYSTOLIC BLOOD PRESSURE: 128 MMHG | TEMPERATURE: 98.5 F | DIASTOLIC BLOOD PRESSURE: 86 MMHG | BODY MASS INDEX: 45.34 KG/M2 | WEIGHT: 289.5 LBS | HEART RATE: 78 BPM

## 2018-10-11 DIAGNOSIS — J06.9 VIRAL URI: ICD-10-CM

## 2018-10-11 DIAGNOSIS — N39.0 URINARY TRACT INFECTION WITHOUT HEMATURIA, SITE UNSPECIFIED: ICD-10-CM

## 2018-10-11 DIAGNOSIS — R10.30 LOWER ABDOMINAL PAIN: ICD-10-CM

## 2018-10-11 DIAGNOSIS — H66.002 ACUTE SUPPURATIVE OTITIS MEDIA OF LEFT EAR WITHOUT SPONTANEOUS RUPTURE OF TYMPANIC MEMBRANE, RECURRENCE NOT SPECIFIED: Primary | ICD-10-CM

## 2018-10-11 LAB
APPEARANCE FLUID: CLEAR
BILIRUBIN, POC: NORMAL
BLOOD URINE, POC: NORMAL
CLARITY, POC: CLEAR
COLOR, POC: YELLOW
GLUCOSE URINE, POC: NORMAL
KETONES, POC: NORMAL
LEUKOCYTE EST, POC: NORMAL
NITRITE, POC: NORMAL
PH, POC: 6
PROTEIN, POC: NORMAL
SPECIFIC GRAVITY, POC: 1.01
URINE CULTURE, ROUTINE: NORMAL
UROBILINOGEN, POC: NORMAL

## 2018-10-11 PROCEDURE — G8427 DOCREV CUR MEDS BY ELIG CLIN: HCPCS | Performed by: NURSE PRACTITIONER

## 2018-10-11 PROCEDURE — 99213 OFFICE O/P EST LOW 20 MIN: CPT | Performed by: NURSE PRACTITIONER

## 2018-10-11 PROCEDURE — 81002 URINALYSIS NONAUTO W/O SCOPE: CPT | Performed by: NURSE PRACTITIONER

## 2018-10-11 PROCEDURE — 1036F TOBACCO NON-USER: CPT | Performed by: NURSE PRACTITIONER

## 2018-10-11 PROCEDURE — 87086 URINE CULTURE/COLONY COUNT: CPT | Performed by: NURSE PRACTITIONER

## 2018-10-11 PROCEDURE — G8417 CALC BMI ABV UP PARAM F/U: HCPCS | Performed by: NURSE PRACTITIONER

## 2018-10-11 PROCEDURE — G8484 FLU IMMUNIZE NO ADMIN: HCPCS | Performed by: NURSE PRACTITIONER

## 2018-10-11 RX ORDER — AMOXICILLIN AND CLAVULANATE POTASSIUM 875; 125 MG/1; MG/1
1 TABLET, FILM COATED ORAL 2 TIMES DAILY
Qty: 20 TABLET | Refills: 0 | Status: SHIPPED | OUTPATIENT
Start: 2018-10-11 | End: 2018-10-17 | Stop reason: ALTCHOICE

## 2018-10-11 RX ORDER — OMEPRAZOLE 20 MG/1
CAPSULE, DELAYED RELEASE ORAL
Qty: 30 CAPSULE | Refills: 5 | Status: SHIPPED | OUTPATIENT
Start: 2018-10-11 | End: 2018-12-03

## 2018-10-11 RX ORDER — AZELASTINE 1 MG/ML
1 SPRAY, METERED NASAL 2 TIMES DAILY
COMMUNITY
End: 2022-01-06 | Stop reason: ALTCHOICE

## 2018-10-11 ASSESSMENT — ENCOUNTER SYMPTOMS
SHORTNESS OF BREATH: 0
NAUSEA: 1
ABDOMINAL PAIN: 1
COUGH: 0
DIARRHEA: 0
CHANGE IN BOWEL HABIT: 0
WHEEZING: 0
VOMITING: 0
SINUS PRESSURE: 1
SORE THROAT: 1
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
## 2018-10-17 ENCOUNTER — OFFICE VISIT (OUTPATIENT)
Dept: FAMILY MEDICINE CLINIC | Age: 28
End: 2018-10-17
Payer: COMMERCIAL

## 2018-10-17 VITALS
DIASTOLIC BLOOD PRESSURE: 88 MMHG | OXYGEN SATURATION: 98 % | SYSTOLIC BLOOD PRESSURE: 124 MMHG | WEIGHT: 289 LBS | BODY MASS INDEX: 45.26 KG/M2 | HEART RATE: 106 BPM | RESPIRATION RATE: 16 BRPM | TEMPERATURE: 98.6 F

## 2018-10-17 DIAGNOSIS — H60.392 OTHER INFECTIVE ACUTE OTITIS EXTERNA OF LEFT EAR: Primary | ICD-10-CM

## 2018-10-17 PROCEDURE — 1036F TOBACCO NON-USER: CPT | Performed by: NURSE PRACTITIONER

## 2018-10-17 PROCEDURE — 4130F TOPICAL PREP RX AOE: CPT | Performed by: NURSE PRACTITIONER

## 2018-10-17 PROCEDURE — G8484 FLU IMMUNIZE NO ADMIN: HCPCS | Performed by: NURSE PRACTITIONER

## 2018-10-17 PROCEDURE — G8427 DOCREV CUR MEDS BY ELIG CLIN: HCPCS | Performed by: NURSE PRACTITIONER

## 2018-10-17 PROCEDURE — 99213 OFFICE O/P EST LOW 20 MIN: CPT | Performed by: NURSE PRACTITIONER

## 2018-10-17 PROCEDURE — G8417 CALC BMI ABV UP PARAM F/U: HCPCS | Performed by: NURSE PRACTITIONER

## 2018-10-17 RX ORDER — CIPROFLOXACIN AND DEXAMETHASONE 3; 1 MG/ML; MG/ML
4 SUSPENSION/ DROPS AURICULAR (OTIC) 2 TIMES DAILY
Qty: 1 BOTTLE | Refills: 0 | Status: SHIPPED | OUTPATIENT
Start: 2018-10-17 | End: 2018-10-24

## 2018-10-17 RX ORDER — IBUPROFEN 800 MG/1
800 TABLET ORAL EVERY 8 HOURS PRN
Qty: 90 TABLET | Refills: 0 | Status: SHIPPED | OUTPATIENT
Start: 2018-10-17 | End: 2019-10-14

## 2018-10-17 ASSESSMENT — ENCOUNTER SYMPTOMS
VOMITING: 0
COUGH: 0
RHINORRHEA: 0
SINUS PAIN: 0
SINUS PRESSURE: 1
NAUSEA: 1
SORE THROAT: 1

## 2018-10-17 NOTE — PROGRESS NOTES
(SINGULAIR) 10 MG tablet Take 1 tablet by mouth nightly 30 tablet 5    DULoxetine (CYMBALTA) 30 MG extended release capsule 1 capsule daily  0    ondansetron (ZOFRAN ODT) 4 MG disintegrating tablet Take 1 tablet by mouth every 8 hours as needed for Nausea 20 tablet 0    norgestrel-ethinyl estradiol (CRYSELLE-28) 0.3-30 MG-MCG per tablet Take 1 tablet by mouth daily      tiZANidine (ZANAFLEX) 4 MG tablet Take 1 tablet by mouth 2 times daily Muscle spasms  0    megestrol (MEGACE) 40 MG tablet       meclofenamate (MECLOMEN) 100 MG capsule        No current facility-administered medications for this visit. Allergies   Allergen Reactions    Gabapentin      EXCESSIVE SEDATION    Pregabalin      EXCESSIVE SEDATION (Lyrica)           Subjective:      Review of Systems   Constitutional: Positive for fatigue and fever. HENT: Positive for ear discharge (clear fluid), ear pain, sinus pressure and sore throat. Negative for rhinorrhea and sinus pain. Respiratory: Negative for cough. Gastrointestinal: Positive for nausea. Negative for vomiting (Nausea). Musculoskeletal: Positive for neck pain. Skin: Negative for rash. Neurological: Positive for headaches. Objective:     /88 (Site: Right Upper Arm, Position: Sitting, Cuff Size: Large Adult)   Pulse 106   Temp 98.6 °F (37 °C) (Tympanic)   Resp 16   Wt 289 lb (131.1 kg)   SpO2 98%   BMI 45.26 kg/m²     Physical Exam   Constitutional: She is oriented to person, place, and time. Vital signs are normal. She appears well-developed and well-nourished. No distress. HENT:   Head: Normocephalic. Right Ear: External ear normal.   Left Ear: Hearing normal. There is swelling and tenderness. No drainage. No foreign bodies. No mastoid tenderness. Nose: Nose normal.   Mouth/Throat: Uvula is midline, oropharynx is clear and moist and mucous membranes are normal.       Eyes: Conjunctivae and EOM are normal. Right eye exhibits no discharge.  Left eye exhibits no discharge. No scleral icterus. Neck: Normal range of motion. Neck supple. Cardiovascular: Normal rate, regular rhythm and normal heart sounds. Pulmonary/Chest: Effort normal. No respiratory distress. Musculoskeletal: Normal range of motion. Neurological: She is alert and oriented to person, place, and time. Skin: Skin is warm, dry and intact. Capillary refill takes less than 2 seconds. She is not diaphoretic. Psychiatric: She has a normal mood and affect. Her speech is normal and behavior is normal. Judgment and thought content normal. Cognition and memory are normal.   Nursing note and vitals reviewed. Assessment:      Diagnosis Orders   1. Other infective acute otitis externa of left ear  ciprofloxacin-dexamethasone (CIPRODEX) 0.3-0.1 % otic suspension    ibuprofen (ADVIL;MOTRIN) 800 MG tablet     No results found for this visit on 10/17/18. Plan:   Ciprodex use as directed. Ibuprofen 800 mg use as directed. Follow up  as needed. Off work today. Return if symptoms worsen or fail to improve. Patient Instructions       Patient Education     Ciprodex use as directed. Ibuprofen 800 mg use as directed. Follow up  as needed. Off work today. Swimmer's Ear: Care Instructions  Your Care Instructions    Swimmer's ear (otitis externa) is inflammation or infection of the ear canal. This is the passage that leads from the outer ear to the eardrum. Any water, sand, or other debris that gets into the ear canal and stays there can cause swimmer's ear. Putting cotton swabs or other items in the ear to clean it can also cause this problem. Swimmer's ear can be very painful. But you can treat the pain and infection with medicines. You should feel better in a few days. Follow-up care is a key part of your treatment and safety. Be sure to make and go to all appointments, and call your doctor if you are having problems.  It's also a good idea to know your test results and

## 2018-10-29 RX ORDER — DULOXETIN HYDROCHLORIDE 30 MG/1
30 CAPSULE, DELAYED RELEASE ORAL DAILY
Qty: 30 CAPSULE | Refills: 5 | Status: SHIPPED | OUTPATIENT
Start: 2018-10-29 | End: 2019-08-14 | Stop reason: SDUPTHER

## 2018-10-29 NOTE — TELEPHONE ENCOUNTER
Mariana Lima is calling to request a refill on the following medication(s):  Requested Prescriptions     Pending Prescriptions Disp Refills    DULoxetine (CYMBALTA) 30 MG extended release capsule 30 capsule 0     Si capsule daily       Last Visit Date (If Applicable):      Next Visit Date:    Visit date not found   Patient is not seeing pain management and wanted to start getting from you. Patient states last  OV you did ok?

## 2018-11-14 ENCOUNTER — OFFICE VISIT (OUTPATIENT)
Dept: OPTOMETRY | Age: 28
End: 2018-11-14
Payer: COMMERCIAL

## 2018-11-14 DIAGNOSIS — H52.13 MYOPIA OF BOTH EYES WITH ASTIGMATISM: Primary | ICD-10-CM

## 2018-11-14 DIAGNOSIS — H52.203 MYOPIA OF BOTH EYES WITH ASTIGMATISM: Primary | ICD-10-CM

## 2018-11-14 PROCEDURE — 92015 DETERMINE REFRACTIVE STATE: CPT | Performed by: OPTOMETRIST

## 2018-11-14 PROCEDURE — 92014 COMPRE OPH EXAM EST PT 1/>: CPT | Performed by: OPTOMETRIST

## 2018-11-14 RX ORDER — PANTOPRAZOLE SODIUM 40 MG/1
40 TABLET, DELAYED RELEASE ORAL
COMMUNITY
Start: 2018-11-13 | End: 2019-02-21

## 2018-11-14 ASSESSMENT — REFRACTION_MANIFEST
OD_CYLINDER: -1.25
OS_AXIS: 001
OS_CYLINDER: -2.25
OS_SPHERE: -1.25
OD_AXIS: 173
OD_SPHERE: -2.75

## 2018-11-14 ASSESSMENT — KERATOMETRY
OD_AXISANGLE_DEGREES: 083
OS_K1POWER_DIOPTERS: 43.50
OD_K1POWER_DIOPTERS: 43.00
OS_K2POWER_DIOPTERS: 45.50
OD_AXISANGLE2_DEGREES: 173
OS_AXISANGLE2_DEGREES: 178
OD_K2POWER_DIOPTERS: 44.50
OS_AXISANGLE_DEGREES: 088

## 2018-11-14 ASSESSMENT — REFRACTION_WEARINGRX
OD_CYLINDER: -1.00
OS_CYLINDER: -2.00
OD_SPHERE: -3.00
SPECS_TYPE: SVL
OD_AXIS: 170
OS_SPHERE: -1.50
OS_AXIS: 180

## 2018-11-14 ASSESSMENT — SLIT LAMP EXAM - LIDS
COMMENTS: NORMAL
COMMENTS: NORMAL

## 2018-11-14 ASSESSMENT — TONOMETRY
OS_IOP_MMHG: 23
OD_IOP_MMHG: 17
IOP_METHOD: NON-CONTACT AIR PUFF

## 2018-11-14 ASSESSMENT — VISUAL ACUITY
OS_CC: 20/25
METHOD: SNELLEN - LINEAR
CORRECTION_TYPE: GLASSES

## 2018-12-03 ENCOUNTER — OFFICE VISIT (OUTPATIENT)
Dept: FAMILY MEDICINE CLINIC | Age: 28
End: 2018-12-03
Payer: COMMERCIAL

## 2018-12-03 VITALS
TEMPERATURE: 99.9 F | HEART RATE: 84 BPM | OXYGEN SATURATION: 98 % | SYSTOLIC BLOOD PRESSURE: 126 MMHG | RESPIRATION RATE: 14 BRPM | WEIGHT: 293 LBS | DIASTOLIC BLOOD PRESSURE: 80 MMHG | BODY MASS INDEX: 46.75 KG/M2

## 2018-12-03 DIAGNOSIS — J01.40 ACUTE PANSINUSITIS, RECURRENCE NOT SPECIFIED: Primary | ICD-10-CM

## 2018-12-03 DIAGNOSIS — Z23 NEED FOR TDAP VACCINATION: ICD-10-CM

## 2018-12-03 PROCEDURE — G8484 FLU IMMUNIZE NO ADMIN: HCPCS | Performed by: NURSE PRACTITIONER

## 2018-12-03 PROCEDURE — 1036F TOBACCO NON-USER: CPT | Performed by: NURSE PRACTITIONER

## 2018-12-03 PROCEDURE — 90715 TDAP VACCINE 7 YRS/> IM: CPT | Performed by: NURSE PRACTITIONER

## 2018-12-03 PROCEDURE — 90471 IMMUNIZATION ADMIN: CPT | Performed by: NURSE PRACTITIONER

## 2018-12-03 PROCEDURE — 99213 OFFICE O/P EST LOW 20 MIN: CPT | Performed by: NURSE PRACTITIONER

## 2018-12-03 PROCEDURE — G8417 CALC BMI ABV UP PARAM F/U: HCPCS | Performed by: NURSE PRACTITIONER

## 2018-12-03 PROCEDURE — G8427 DOCREV CUR MEDS BY ELIG CLIN: HCPCS | Performed by: NURSE PRACTITIONER

## 2018-12-03 RX ORDER — AMOXICILLIN AND CLAVULANATE POTASSIUM 875; 125 MG/1; MG/1
1 TABLET, FILM COATED ORAL 2 TIMES DAILY
Qty: 20 TABLET | Refills: 0 | Status: SHIPPED | OUTPATIENT
Start: 2018-12-03 | End: 2018-12-13

## 2018-12-03 ASSESSMENT — PATIENT HEALTH QUESTIONNAIRE - PHQ9
SUM OF ALL RESPONSES TO PHQ9 QUESTIONS 1 & 2: 0
SUM OF ALL RESPONSES TO PHQ QUESTIONS 1-9: 0
1. LITTLE INTEREST OR PLEASURE IN DOING THINGS: 0
SUM OF ALL RESPONSES TO PHQ QUESTIONS 1-9: 0
2. FEELING DOWN, DEPRESSED OR HOPELESS: 0

## 2018-12-03 ASSESSMENT — ENCOUNTER SYMPTOMS
WHEEZING: 0
RHINORRHEA: 1
TROUBLE SWALLOWING: 0
DIARRHEA: 0
SINUS PRESSURE: 1
SORE THROAT: 0
SHORTNESS OF BREATH: 0
NAUSEA: 0
VOMITING: 0
COUGH: 0

## 2018-12-04 ASSESSMENT — ENCOUNTER SYMPTOMS: HOARSE VOICE: 0

## 2018-12-19 ENCOUNTER — OFFICE VISIT (OUTPATIENT)
Dept: FAMILY MEDICINE CLINIC | Age: 28
End: 2018-12-19
Payer: COMMERCIAL

## 2018-12-19 VITALS
DIASTOLIC BLOOD PRESSURE: 84 MMHG | TEMPERATURE: 101.6 F | RESPIRATION RATE: 20 BRPM | OXYGEN SATURATION: 98 % | HEART RATE: 104 BPM | BODY MASS INDEX: 47.77 KG/M2 | SYSTOLIC BLOOD PRESSURE: 130 MMHG | WEIGHT: 293 LBS

## 2018-12-19 DIAGNOSIS — J06.9 VIRAL URI: Primary | ICD-10-CM

## 2018-12-19 DIAGNOSIS — R50.9 FEVER, UNSPECIFIED FEVER CAUSE: ICD-10-CM

## 2018-12-19 LAB
INFLUENZA A ANTIBODY: NEGATIVE
INFLUENZA B ANTIBODY: NEGATIVE

## 2018-12-19 PROCEDURE — 1036F TOBACCO NON-USER: CPT | Performed by: NURSE PRACTITIONER

## 2018-12-19 PROCEDURE — G8417 CALC BMI ABV UP PARAM F/U: HCPCS | Performed by: NURSE PRACTITIONER

## 2018-12-19 PROCEDURE — G8427 DOCREV CUR MEDS BY ELIG CLIN: HCPCS | Performed by: NURSE PRACTITIONER

## 2018-12-19 PROCEDURE — 99213 OFFICE O/P EST LOW 20 MIN: CPT | Performed by: NURSE PRACTITIONER

## 2018-12-19 PROCEDURE — 87804 INFLUENZA ASSAY W/OPTIC: CPT | Performed by: NURSE PRACTITIONER

## 2018-12-19 PROCEDURE — G8484 FLU IMMUNIZE NO ADMIN: HCPCS | Performed by: NURSE PRACTITIONER

## 2018-12-19 ASSESSMENT — ENCOUNTER SYMPTOMS
VOMITING: 1
SORE THROAT: 1
DIARRHEA: 0
NAUSEA: 1
WHEEZING: 0
RHINORRHEA: 0
SHORTNESS OF BREATH: 0
TROUBLE SWALLOWING: 0
ABDOMINAL PAIN: 0
SINUS PRESSURE: 1
COUGH: 1

## 2018-12-19 NOTE — PROGRESS NOTES
Viral URI     2. Fever, unspecified fever cause  POCT Influenza A/B     Results for POC orders placed in visit on 12/19/18   POCT Influenza A/B   Result Value Ref Range    Influenza A Ab negative     Influenza B Ab negative        Plan:     Return if symptoms worsen or fail to improve. Orders Placed This Encounter   Procedures    POCT Influenza A/B     Instructed to rest, increase fluids, drink warm liquids and honey for sore throat. Continue tylenol/ibuprofen as needed for fevers/discomfort. Monitor for worsening symptoms, call office with any concerns. All patient questions answered. Patient voiced understanding. Follow up as directed.      Electronically signed by SUE Sexton CNP on 12/31/2018 at 12:52 PM

## 2019-01-19 RX ORDER — LORATADINE 10 MG/1
TABLET ORAL
Qty: 30 TABLET | Refills: 0 | Status: SHIPPED | OUTPATIENT
Start: 2019-01-19 | End: 2019-05-16 | Stop reason: SDUPTHER

## 2019-02-21 ENCOUNTER — OFFICE VISIT (OUTPATIENT)
Dept: FAMILY MEDICINE CLINIC | Age: 29
End: 2019-02-21
Payer: COMMERCIAL

## 2019-02-21 VITALS
OXYGEN SATURATION: 98 % | HEIGHT: 67 IN | DIASTOLIC BLOOD PRESSURE: 90 MMHG | WEIGHT: 293 LBS | HEART RATE: 102 BPM | BODY MASS INDEX: 45.99 KG/M2 | TEMPERATURE: 99.4 F | SYSTOLIC BLOOD PRESSURE: 132 MMHG

## 2019-02-21 DIAGNOSIS — J40 BRONCHITIS: Primary | ICD-10-CM

## 2019-02-21 DIAGNOSIS — R11.0 NAUSEA: ICD-10-CM

## 2019-02-21 PROCEDURE — G8484 FLU IMMUNIZE NO ADMIN: HCPCS | Performed by: NURSE PRACTITIONER

## 2019-02-21 PROCEDURE — G8417 CALC BMI ABV UP PARAM F/U: HCPCS | Performed by: NURSE PRACTITIONER

## 2019-02-21 PROCEDURE — 1036F TOBACCO NON-USER: CPT | Performed by: NURSE PRACTITIONER

## 2019-02-21 PROCEDURE — G8427 DOCREV CUR MEDS BY ELIG CLIN: HCPCS | Performed by: NURSE PRACTITIONER

## 2019-02-21 PROCEDURE — 99213 OFFICE O/P EST LOW 20 MIN: CPT | Performed by: NURSE PRACTITIONER

## 2019-02-21 RX ORDER — OMEPRAZOLE 20 MG/1
20 CAPSULE, DELAYED RELEASE ORAL DAILY
COMMUNITY
End: 2019-08-14 | Stop reason: SDUPTHER

## 2019-02-21 RX ORDER — AZITHROMYCIN 250 MG/1
TABLET, FILM COATED ORAL
Qty: 1 PACKET | Refills: 0 | Status: SHIPPED | OUTPATIENT
Start: 2019-02-21 | End: 2019-10-14 | Stop reason: SDUPTHER

## 2019-02-21 RX ORDER — ONDANSETRON 4 MG/1
4 TABLET, ORALLY DISINTEGRATING ORAL EVERY 8 HOURS PRN
Qty: 20 TABLET | Refills: 0 | Status: SHIPPED | OUTPATIENT
Start: 2019-02-21 | End: 2019-03-04 | Stop reason: SDUPTHER

## 2019-02-21 ASSESSMENT — ENCOUNTER SYMPTOMS
SHORTNESS OF BREATH: 1
COUGH: 1
NAUSEA: 1
SINUS PRESSURE: 1
VOMITING: 1
DIARRHEA: 0
RHINORRHEA: 1
SORE THROAT: 0
SINUS COMPLAINT: 1
WHEEZING: 1

## 2019-03-04 ENCOUNTER — OFFICE VISIT (OUTPATIENT)
Dept: FAMILY MEDICINE CLINIC | Age: 29
End: 2019-03-04
Payer: COMMERCIAL

## 2019-03-04 VITALS
HEART RATE: 118 BPM | OXYGEN SATURATION: 98 % | DIASTOLIC BLOOD PRESSURE: 80 MMHG | WEIGHT: 293 LBS | SYSTOLIC BLOOD PRESSURE: 122 MMHG | BODY MASS INDEX: 45.99 KG/M2 | TEMPERATURE: 98.8 F | HEIGHT: 67 IN

## 2019-03-04 DIAGNOSIS — R11.0 NAUSEA: ICD-10-CM

## 2019-03-04 DIAGNOSIS — J06.9 VIRAL URI: Primary | ICD-10-CM

## 2019-03-04 PROCEDURE — G8484 FLU IMMUNIZE NO ADMIN: HCPCS | Performed by: NURSE PRACTITIONER

## 2019-03-04 PROCEDURE — G8417 CALC BMI ABV UP PARAM F/U: HCPCS | Performed by: NURSE PRACTITIONER

## 2019-03-04 PROCEDURE — 1036F TOBACCO NON-USER: CPT | Performed by: NURSE PRACTITIONER

## 2019-03-04 PROCEDURE — 99213 OFFICE O/P EST LOW 20 MIN: CPT | Performed by: NURSE PRACTITIONER

## 2019-03-04 PROCEDURE — G8427 DOCREV CUR MEDS BY ELIG CLIN: HCPCS | Performed by: NURSE PRACTITIONER

## 2019-03-04 RX ORDER — ONDANSETRON 4 MG/1
4 TABLET, ORALLY DISINTEGRATING ORAL EVERY 8 HOURS PRN
Qty: 20 TABLET | Refills: 0 | Status: SHIPPED | OUTPATIENT
Start: 2019-03-04 | End: 2019-08-17 | Stop reason: SDUPTHER

## 2019-03-04 ASSESSMENT — ENCOUNTER SYMPTOMS
SINUS PRESSURE: 0
ABDOMINAL PAIN: 0
NAUSEA: 1
SORE THROAT: 1
CHANGE IN BOWEL HABIT: 0
VOMITING: 1
DIARRHEA: 0
COUGH: 1
RHINORRHEA: 0

## 2019-03-05 ASSESSMENT — ENCOUNTER SYMPTOMS: SHORTNESS OF BREATH: 1

## 2019-04-09 RX ORDER — MONTELUKAST SODIUM 10 MG/1
TABLET ORAL
Qty: 30 TABLET | Refills: 5 | Status: SHIPPED | OUTPATIENT
Start: 2019-04-09 | End: 2021-07-09

## 2019-05-09 DIAGNOSIS — G44.229 CHRONIC TENSION-TYPE HEADACHE, NOT INTRACTABLE: ICD-10-CM

## 2019-05-09 RX ORDER — TOPIRAMATE 50 MG/1
TABLET, FILM COATED ORAL
Qty: 60 TABLET | Refills: 2 | Status: SHIPPED | OUTPATIENT
Start: 2019-05-09 | End: 2021-07-09

## 2019-05-09 NOTE — TELEPHONE ENCOUNTER
Kana Maradiaga is calling to request a refill on the following medication(s):  Requested Prescriptions     Pending Prescriptions Disp Refills    topiramate (TOPAMAX) 50 MG tablet [Pharmacy Med Name: TOPIRAMATE 50 MG TABLET] 60 tablet 2     Sig: take 1 tablet by mouth twice a day       Last Visit Date (If Applicable):  4/8/9119    Next Visit Date:    Visit date not found

## 2019-05-16 RX ORDER — LORATADINE 10 MG/1
TABLET ORAL
Qty: 30 TABLET | Refills: 5 | Status: SHIPPED | OUTPATIENT
Start: 2019-05-16 | End: 2020-04-21 | Stop reason: ALTCHOICE

## 2019-05-16 NOTE — TELEPHONE ENCOUNTER
Vero Pickard is calling to request a refill on the following medication(s):  Requested Prescriptions     Pending Prescriptions Disp Refills    loratadine (CLARITIN) 10 MG tablet [Pharmacy Med Name: LORATADINE 10 MG TABLET] 30 tablet 5     Sig: take 1 tablet by mouth once daily       Last Visit Date (If Applicable):  6/5/6391    Next Visit Date:    Visit date not found

## 2019-07-03 ENCOUNTER — OFFICE VISIT (OUTPATIENT)
Dept: OPTOMETRY | Age: 29
End: 2019-07-03
Payer: COMMERCIAL

## 2019-07-03 DIAGNOSIS — M35.01: Primary | ICD-10-CM

## 2019-07-03 PROCEDURE — 99213 OFFICE O/P EST LOW 20 MIN: CPT | Performed by: OPTOMETRIST

## 2019-07-03 RX ORDER — LOTEPREDNOL ETABONATE 5 MG/ML
1 SUSPENSION/ DROPS OPHTHALMIC 4 TIMES DAILY
Qty: 1 BOTTLE | Refills: 0 | Status: SHIPPED | OUTPATIENT
Start: 2019-07-03 | End: 2019-07-10

## 2019-07-03 ASSESSMENT — REFRACTION_WEARINGRX
SPECS_TYPE: SVL
OD_CYLINDER: -1.25
OS_AXIS: 001
OD_SPHERE: -2.75
OD_AXIS: 173
OS_CYLINDER: -2.25
OS_SPHERE: -1.25

## 2019-07-03 ASSESSMENT — TONOMETRY
OS_IOP_MMHG: 25
IOP_METHOD: NON-CONTACT AIR PUFF
OD_IOP_MMHG: 20

## 2019-07-03 ASSESSMENT — VISUAL ACUITY
OS_CC: 20/20
METHOD: SNELLEN - LINEAR

## 2019-07-16 RX ORDER — PREDNISOLONE ACETATE 10 MG/ML
1 SUSPENSION/ DROPS OPHTHALMIC 4 TIMES DAILY
Qty: 28 DROP | Refills: 0 | Status: SHIPPED | OUTPATIENT
Start: 2019-07-16 | End: 2019-07-23

## 2019-07-31 ENCOUNTER — OFFICE VISIT (OUTPATIENT)
Dept: OPTOMETRY | Age: 29
End: 2019-07-31
Payer: COMMERCIAL

## 2019-07-31 DIAGNOSIS — M35.01: Primary | ICD-10-CM

## 2019-07-31 PROCEDURE — 1036F TOBACCO NON-USER: CPT | Performed by: OPTOMETRIST

## 2019-07-31 PROCEDURE — G8427 DOCREV CUR MEDS BY ELIG CLIN: HCPCS | Performed by: OPTOMETRIST

## 2019-07-31 PROCEDURE — G8417 CALC BMI ABV UP PARAM F/U: HCPCS | Performed by: OPTOMETRIST

## 2019-07-31 PROCEDURE — 99213 OFFICE O/P EST LOW 20 MIN: CPT | Performed by: OPTOMETRIST

## 2019-07-31 ASSESSMENT — VISUAL ACUITY
CORRECTION_TYPE: GLASSES
METHOD: SNELLEN - LINEAR
OS_CC: 20/20

## 2019-07-31 ASSESSMENT — TONOMETRY
OS_IOP_MMHG: 23
IOP_METHOD: NON-CONTACT AIR PUFF
OD_IOP_MMHG: 20

## 2019-07-31 ASSESSMENT — ENCOUNTER SYMPTOMS
RESPIRATORY NEGATIVE: 0
GASTROINTESTINAL NEGATIVE: 0
ALLERGIC/IMMUNOLOGIC NEGATIVE: 0
EYES NEGATIVE: 0

## 2019-07-31 ASSESSMENT — SLIT LAMP EXAM - LIDS: COMMENTS: NORMAL

## 2019-08-14 ENCOUNTER — OFFICE VISIT (OUTPATIENT)
Dept: FAMILY MEDICINE CLINIC | Age: 29
End: 2019-08-14
Payer: COMMERCIAL

## 2019-08-14 VITALS
HEART RATE: 100 BPM | SYSTOLIC BLOOD PRESSURE: 122 MMHG | OXYGEN SATURATION: 99 % | BODY MASS INDEX: 43.7 KG/M2 | WEIGHT: 279 LBS | DIASTOLIC BLOOD PRESSURE: 76 MMHG

## 2019-08-14 DIAGNOSIS — M79.7 FIBROMYALGIA: Primary | ICD-10-CM

## 2019-08-14 DIAGNOSIS — K21.9 GASTROESOPHAGEAL REFLUX DISEASE, ESOPHAGITIS PRESENCE NOT SPECIFIED: ICD-10-CM

## 2019-08-14 DIAGNOSIS — R11.2 NON-INTRACTABLE VOMITING WITH NAUSEA, UNSPECIFIED VOMITING TYPE: ICD-10-CM

## 2019-08-14 DIAGNOSIS — R19.7 DIARRHEA, UNSPECIFIED TYPE: ICD-10-CM

## 2019-08-14 PROCEDURE — G8417 CALC BMI ABV UP PARAM F/U: HCPCS | Performed by: NURSE PRACTITIONER

## 2019-08-14 PROCEDURE — 1036F TOBACCO NON-USER: CPT | Performed by: NURSE PRACTITIONER

## 2019-08-14 PROCEDURE — G8427 DOCREV CUR MEDS BY ELIG CLIN: HCPCS | Performed by: NURSE PRACTITIONER

## 2019-08-14 PROCEDURE — 99214 OFFICE O/P EST MOD 30 MIN: CPT | Performed by: NURSE PRACTITIONER

## 2019-08-14 RX ORDER — BACLOFEN 20 MG
TABLET ORAL 2 TIMES DAILY
COMMUNITY
End: 2020-04-09

## 2019-08-14 RX ORDER — OMEPRAZOLE 40 MG/1
40 CAPSULE, DELAYED RELEASE ORAL 2 TIMES DAILY
Qty: 60 CAPSULE | Refills: 2 | Status: SHIPPED | OUTPATIENT
Start: 2019-08-14 | End: 2019-09-08 | Stop reason: SDUPTHER

## 2019-08-14 RX ORDER — DULOXETIN HYDROCHLORIDE 60 MG/1
60 CAPSULE, DELAYED RELEASE ORAL DAILY
Qty: 30 CAPSULE | Refills: 5
Start: 2019-08-14 | End: 2019-09-08 | Stop reason: SDUPTHER

## 2019-08-14 ASSESSMENT — ENCOUNTER SYMPTOMS
NAUSEA: 1
SHORTNESS OF BREATH: 0
EYES NEGATIVE: 1
DIARRHEA: 1
WHEEZING: 0
VOMITING: 1
COUGH: 0
CONSTIPATION: 0

## 2019-08-14 NOTE — PROGRESS NOTES
(PROBIOTIC DAILY PO) Take by mouth      norgestrel-ethinyl estradiol (CRYSELLE-28) 0.3-30 MG-MCG per tablet Take 1 tablet by mouth daily      tiZANidine (ZANAFLEX) 4 MG tablet Take 1 tablet by mouth 2 times daily Muscle spasms  0    megestrol (MEGACE) 40 MG tablet Take 40 mg by mouth as needed       meclofenamate (MECLOMEN) 100 MG capsule Take 100 mg by mouth as needed       ondansetron (ZOFRAN ODT) 4 MG disintegrating tablet Take 1 tablet by mouth every 8 hours as needed for Nausea 20 tablet 0     No current facility-administered medications for this visit. Allergies   Allergen Reactions    Gabapentin      EXCESSIVE SEDATION    Pregabalin      EXCESSIVE SEDATION (Lyrica)         Health Maintenance   Topic Date Due    HIV screen  08/24/2006    Flu vaccine (1) 12/03/2019 (Originally 9/1/2019)    Cervical cancer screen  08/03/2020    Colon cancer screen colonoscopy  07/18/2023    DTaP/Tdap/Td vaccine (8 - Td) 12/03/2028    HPV vaccine  Completed    Varicella Vaccine  Completed    Pneumococcal 0-64 years Vaccine  Aged Out     Subjective:      Review of Systems   Constitutional: Positive for fatigue. Negative for chills and fever. HENT: Negative. Negative for sore throat. Eyes: Negative. Respiratory: Negative for cough, shortness of breath and wheezing. Cardiovascular: Negative for chest pain, palpitations and leg swelling. Gastrointestinal: Positive for abdominal pain (generalized), change in bowel habit (diarrhea), diarrhea, nausea and vomiting. Negative for constipation. Endocrine: Negative for cold intolerance, heat intolerance, polydipsia, polyphagia and polyuria. Genitourinary: Negative. Musculoskeletal: Negative for arthralgias and myalgias. Skin: Negative. Allergic/Immunologic: Negative for environmental allergies and food allergies. Neurological: Positive for headaches. Negative for dizziness and weakness.    Psychiatric/Behavioral: Positive for sleep disturbance

## 2019-08-16 DIAGNOSIS — R11.0 NAUSEA: ICD-10-CM

## 2019-08-16 NOTE — TELEPHONE ENCOUNTER
Radhames uLbin is calling to request a refill on the following medication(s):  Requested Prescriptions     Pending Prescriptions Disp Refills    ondansetron (ZOFRAN ODT) 4 MG disintegrating tablet 20 tablet 0     Sig: Take 1 tablet by mouth every 8 hours as needed for Nausea       Last Visit Date (If Applicable):  5/09/1109    Next Visit Date:    Visit date not found

## 2019-08-17 RX ORDER — ONDANSETRON 4 MG/1
4 TABLET, ORALLY DISINTEGRATING ORAL EVERY 8 HOURS PRN
Qty: 20 TABLET | Refills: 0 | Status: SHIPPED | OUTPATIENT
Start: 2019-08-17 | End: 2021-01-18 | Stop reason: SDUPTHER

## 2019-08-23 PROBLEM — K21.9 GASTROESOPHAGEAL REFLUX DISEASE: Status: ACTIVE | Noted: 2019-08-23

## 2019-08-23 ASSESSMENT — ENCOUNTER SYMPTOMS
ABDOMINAL PAIN: 1
SORE THROAT: 0
CHANGE IN BOWEL HABIT: 1

## 2019-08-27 ENCOUNTER — OFFICE VISIT (OUTPATIENT)
Dept: FAMILY MEDICINE CLINIC | Age: 29
End: 2019-08-27
Payer: COMMERCIAL

## 2019-08-27 VITALS
OXYGEN SATURATION: 98 % | SYSTOLIC BLOOD PRESSURE: 118 MMHG | BODY MASS INDEX: 43.38 KG/M2 | WEIGHT: 277 LBS | HEART RATE: 81 BPM | DIASTOLIC BLOOD PRESSURE: 78 MMHG

## 2019-08-27 DIAGNOSIS — B35.4 TINEA CORPORIS: Primary | ICD-10-CM

## 2019-08-27 PROCEDURE — 99213 OFFICE O/P EST LOW 20 MIN: CPT | Performed by: NURSE PRACTITIONER

## 2019-08-27 PROCEDURE — G8427 DOCREV CUR MEDS BY ELIG CLIN: HCPCS | Performed by: NURSE PRACTITIONER

## 2019-08-27 PROCEDURE — G8417 CALC BMI ABV UP PARAM F/U: HCPCS | Performed by: NURSE PRACTITIONER

## 2019-08-27 PROCEDURE — 1036F TOBACCO NON-USER: CPT | Performed by: NURSE PRACTITIONER

## 2019-08-27 RX ORDER — KETOCONAZOLE 20 MG/G
CREAM TOPICAL
Qty: 1 TUBE | Refills: 0 | Status: SHIPPED | OUTPATIENT
Start: 2019-08-27 | End: 2020-04-09
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
## 2019-09-01 ENCOUNTER — OFFICE VISIT (OUTPATIENT)
Dept: PRIMARY CARE CLINIC | Age: 29
End: 2019-09-01
Payer: COMMERCIAL

## 2019-09-01 VITALS
DIASTOLIC BLOOD PRESSURE: 80 MMHG | WEIGHT: 269.6 LBS | TEMPERATURE: 97.8 F | HEART RATE: 68 BPM | RESPIRATION RATE: 18 BRPM | BODY MASS INDEX: 42.31 KG/M2 | SYSTOLIC BLOOD PRESSURE: 118 MMHG | OXYGEN SATURATION: 98 % | HEIGHT: 67 IN

## 2019-09-01 DIAGNOSIS — J40 BRONCHITIS: Primary | ICD-10-CM

## 2019-09-01 DIAGNOSIS — R05.9 COUGH: ICD-10-CM

## 2019-09-01 PROBLEM — M25.50 PAIN IN JOINT: Status: ACTIVE | Noted: 2019-09-01

## 2019-09-01 PROBLEM — K30 FUNCTIONAL DYSPEPSIA: Status: ACTIVE | Noted: 2019-05-01

## 2019-09-01 PROBLEM — R11.15 CYCLICAL VOMITING SYNDROME: Status: ACTIVE | Noted: 2019-05-01

## 2019-09-01 PROBLEM — M22.40 CHONDROMALACIA PATELLAE: Status: ACTIVE | Noted: 2017-01-31

## 2019-09-01 PROCEDURE — G8427 DOCREV CUR MEDS BY ELIG CLIN: HCPCS | Performed by: PHYSICIAN ASSISTANT

## 2019-09-01 PROCEDURE — G8417 CALC BMI ABV UP PARAM F/U: HCPCS | Performed by: PHYSICIAN ASSISTANT

## 2019-09-01 PROCEDURE — 94640 AIRWAY INHALATION TREATMENT: CPT | Performed by: PHYSICIAN ASSISTANT

## 2019-09-01 PROCEDURE — 1036F TOBACCO NON-USER: CPT | Performed by: PHYSICIAN ASSISTANT

## 2019-09-01 PROCEDURE — 99213 OFFICE O/P EST LOW 20 MIN: CPT | Performed by: PHYSICIAN ASSISTANT

## 2019-09-01 RX ORDER — PREDNISONE 20 MG/1
20 TABLET ORAL 2 TIMES DAILY
Qty: 10 TABLET | Refills: 0 | Status: SHIPPED | OUTPATIENT
Start: 2019-09-01 | End: 2019-10-16 | Stop reason: SDUPTHER

## 2019-09-01 RX ORDER — ALBUTEROL SULFATE 1.25 MG/3ML
1 SOLUTION RESPIRATORY (INHALATION) EVERY 6 HOURS PRN
Qty: 360 ML | Refills: 3 | Status: SHIPPED | OUTPATIENT
Start: 2019-09-01 | End: 2020-06-22

## 2019-09-01 RX ORDER — GUAIFENESIN AND CODEINE PHOSPHATE 100; 10 MG/5ML; MG/5ML
5 SOLUTION ORAL 4 TIMES DAILY PRN
Qty: 120 ML | Refills: 0 | Status: SHIPPED | OUTPATIENT
Start: 2019-09-01 | End: 2019-09-08

## 2019-09-01 RX ORDER — IPRATROPIUM BROMIDE AND ALBUTEROL SULFATE 2.5; .5 MG/3ML; MG/3ML
1 SOLUTION RESPIRATORY (INHALATION) ONCE
Status: COMPLETED | OUTPATIENT
Start: 2019-09-01 | End: 2019-09-01

## 2019-09-01 RX ORDER — TRANEXAMIC ACID 650 1/1
1300 TABLET ORAL
COMMUNITY
End: 2019-10-14

## 2019-09-01 RX ADMIN — IPRATROPIUM BROMIDE AND ALBUTEROL SULFATE 1 AMPULE: 2.5; .5 SOLUTION RESPIRATORY (INHALATION) at 15:42

## 2019-09-01 ASSESSMENT — ENCOUNTER SYMPTOMS
WHEEZING: 0
SHORTNESS OF BREATH: 0
SORE THROAT: 1
COUGH: 1
SHORTNESS OF BREATH: 1
WHEEZING: 1
RHINORRHEA: 1
COUGH: 0

## 2019-09-05 DIAGNOSIS — M79.7 FIBROMYALGIA: ICD-10-CM

## 2019-09-05 NOTE — TELEPHONE ENCOUNTER
Gladys Machuca is calling to request a refill on the following medication(s):  Requested Prescriptions     Pending Prescriptions Disp Refills    DULoxetine (CYMBALTA) 60 MG extended release capsule 30 capsule 5     Sig: Take 1 capsule by mouth daily       Last Visit Date (If Applicable):  6/67/0661    Next Visit Date:    Visit date not found

## 2019-09-08 DIAGNOSIS — K21.9 GASTROESOPHAGEAL REFLUX DISEASE, ESOPHAGITIS PRESENCE NOT SPECIFIED: ICD-10-CM

## 2019-09-08 RX ORDER — OMEPRAZOLE 40 MG/1
40 CAPSULE, DELAYED RELEASE ORAL 2 TIMES DAILY
Qty: 60 CAPSULE | Refills: 2 | Status: SHIPPED | OUTPATIENT
Start: 2019-09-08 | End: 2020-09-10 | Stop reason: SDUPTHER

## 2019-09-08 RX ORDER — DULOXETIN HYDROCHLORIDE 60 MG/1
60 CAPSULE, DELAYED RELEASE ORAL DAILY
Qty: 30 CAPSULE | Refills: 5 | Status: SHIPPED | OUTPATIENT
Start: 2019-09-08 | End: 2020-07-08 | Stop reason: SINTOL

## 2019-10-03 RX ORDER — DULOXETIN HYDROCHLORIDE 30 MG/1
CAPSULE, DELAYED RELEASE ORAL
Qty: 30 CAPSULE | Refills: 5 | Status: SHIPPED | OUTPATIENT
Start: 2019-10-03 | End: 2020-07-08 | Stop reason: SINTOL

## 2019-10-14 ENCOUNTER — OFFICE VISIT (OUTPATIENT)
Dept: FAMILY MEDICINE CLINIC | Age: 29
End: 2019-10-14
Payer: COMMERCIAL

## 2019-10-14 VITALS
DIASTOLIC BLOOD PRESSURE: 82 MMHG | OXYGEN SATURATION: 98 % | TEMPERATURE: 99.8 F | SYSTOLIC BLOOD PRESSURE: 126 MMHG | WEIGHT: 278 LBS | HEART RATE: 83 BPM | BODY MASS INDEX: 43.54 KG/M2

## 2019-10-14 DIAGNOSIS — R05.9 COUGH: ICD-10-CM

## 2019-10-14 DIAGNOSIS — H66.002 NON-RECURRENT ACUTE SUPPURATIVE OTITIS MEDIA OF LEFT EAR WITHOUT SPONTANEOUS RUPTURE OF TYMPANIC MEMBRANE: ICD-10-CM

## 2019-10-14 DIAGNOSIS — J40 BRONCHITIS: Primary | ICD-10-CM

## 2019-10-14 PROCEDURE — G8427 DOCREV CUR MEDS BY ELIG CLIN: HCPCS | Performed by: NURSE PRACTITIONER

## 2019-10-14 PROCEDURE — G8417 CALC BMI ABV UP PARAM F/U: HCPCS | Performed by: NURSE PRACTITIONER

## 2019-10-14 PROCEDURE — 99213 OFFICE O/P EST LOW 20 MIN: CPT | Performed by: NURSE PRACTITIONER

## 2019-10-14 PROCEDURE — 1036F TOBACCO NON-USER: CPT | Performed by: NURSE PRACTITIONER

## 2019-10-14 PROCEDURE — G8484 FLU IMMUNIZE NO ADMIN: HCPCS | Performed by: NURSE PRACTITIONER

## 2019-10-14 RX ORDER — BENZONATATE 100 MG/1
100 CAPSULE ORAL 3 TIMES DAILY PRN
Qty: 21 CAPSULE | Refills: 1 | Status: SHIPPED | OUTPATIENT
Start: 2019-10-14 | End: 2020-04-09

## 2019-10-14 RX ORDER — AZITHROMYCIN 250 MG/1
TABLET, FILM COATED ORAL
Qty: 1 PACKET | Refills: 0 | Status: SHIPPED | OUTPATIENT
Start: 2019-10-14 | End: 2019-10-18

## 2019-10-14 ASSESSMENT — ENCOUNTER SYMPTOMS
SINUS PRESSURE: 0
VOMITING: 0
RHINORRHEA: 0
WHEEZING: 0
ABDOMINAL PAIN: 0
SHORTNESS OF BREATH: 0
SORE THROAT: 1
DIARRHEA: 0
COUGH: 1
NAUSEA: 0

## 2019-10-16 ENCOUNTER — TELEPHONE (OUTPATIENT)
Dept: FAMILY MEDICINE CLINIC | Age: 29
End: 2019-10-16

## 2019-10-16 DIAGNOSIS — J40 BRONCHITIS: ICD-10-CM

## 2019-10-16 RX ORDER — PREDNISONE 20 MG/1
20 TABLET ORAL 2 TIMES DAILY
Qty: 10 TABLET | Refills: 0 | Status: SHIPPED | OUTPATIENT
Start: 2019-10-16 | End: 2019-10-21

## 2019-10-28 ENCOUNTER — APPOINTMENT (OUTPATIENT)
Dept: CT IMAGING | Age: 29
End: 2019-10-28
Payer: COMMERCIAL

## 2019-10-28 ENCOUNTER — TELEPHONE (OUTPATIENT)
Dept: FAMILY MEDICINE CLINIC | Age: 29
End: 2019-10-28

## 2019-10-28 ENCOUNTER — HOSPITAL ENCOUNTER (EMERGENCY)
Age: 29
Discharge: HOME | End: 2019-10-28
Attending: EMERGENCY MEDICINE
Payer: COMMERCIAL

## 2019-10-28 VITALS
OXYGEN SATURATION: 98 % | TEMPERATURE: 97.7 F | RESPIRATION RATE: 16 BRPM | HEART RATE: 76 BPM | SYSTOLIC BLOOD PRESSURE: 132 MMHG | DIASTOLIC BLOOD PRESSURE: 68 MMHG

## 2019-10-28 DIAGNOSIS — R20.0 NUMBNESS: Primary | ICD-10-CM

## 2019-10-28 LAB
ABSOLUTE EOS #: 0.4 K/UL (ref 0–0.4)
ABSOLUTE IMMATURE GRANULOCYTE: NORMAL K/UL (ref 0–0.3)
ABSOLUTE LYMPH #: 3.7 K/UL (ref 1–4.8)
ABSOLUTE MONO #: 0.6 K/UL (ref 0.1–1.2)
ALBUMIN SERPL-MCNC: 4.1 G/DL (ref 3.5–5.2)
ALBUMIN/GLOBULIN RATIO: 1.7 (ref 1–2.5)
ALP BLD-CCNC: 81 U/L (ref 35–104)
ALT SERPL-CCNC: 23 U/L (ref 5–33)
ANION GAP SERPL CALCULATED.3IONS-SCNC: 11 MMOL/L (ref 9–17)
AST SERPL-CCNC: 16 U/L
BASOPHILS # BLD: 1 % (ref 0–1)
BASOPHILS ABSOLUTE: 0.1 K/UL (ref 0–0.2)
BILIRUB SERPL-MCNC: 0.3 MG/DL (ref 0.3–1.2)
BUN BLDV-MCNC: 12 MG/DL (ref 6–20)
BUN/CREAT BLD: 15 (ref 9–20)
CALCIUM SERPL-MCNC: 9.1 MG/DL (ref 8.6–10.4)
CHLORIDE BLD-SCNC: 105 MMOL/L (ref 98–107)
CO2: 24 MMOL/L (ref 20–31)
CREAT SERPL-MCNC: 0.81 MG/DL (ref 0.5–0.9)
DIFFERENTIAL TYPE: NORMAL
EOSINOPHILS RELATIVE PERCENT: 4 % (ref 1–7)
GFR AFRICAN AMERICAN: >60 ML/MIN
GFR NON-AFRICAN AMERICAN: >60 ML/MIN
GFR SERPL CREATININE-BSD FRML MDRD: ABNORMAL ML/MIN/{1.73_M2}
GFR SERPL CREATININE-BSD FRML MDRD: ABNORMAL ML/MIN/{1.73_M2}
GLUCOSE BLD-MCNC: 103 MG/DL (ref 70–99)
HCG QUALITATIVE: NEGATIVE
HCT VFR BLD CALC: 41.2 % (ref 36–46)
HEMOGLOBIN: 14.1 G/DL (ref 12–16)
IMMATURE GRANULOCYTES: NORMAL %
LYMPHOCYTES # BLD: 37 % (ref 16–46)
MCH RBC QN AUTO: 32.1 PG (ref 26–34)
MCHC RBC AUTO-ENTMCNC: 34.1 G/DL (ref 31–37)
MCV RBC AUTO: 94 FL (ref 80–100)
MONOCYTES # BLD: 6 % (ref 4–11)
NRBC AUTOMATED: NORMAL PER 100 WBC
PDW BLD-RTO: 14 % (ref 11–14.5)
PLATELET # BLD: 372 K/UL (ref 140–450)
PLATELET ESTIMATE: NORMAL
PMV BLD AUTO: 7.3 FL (ref 6–12)
POTASSIUM SERPL-SCNC: 3.8 MMOL/L (ref 3.7–5.3)
RBC # BLD: 4.39 M/UL (ref 4–5.2)
RBC # BLD: NORMAL 10*6/UL
SEG NEUTROPHILS: 52 % (ref 43–77)
SEGMENTED NEUTROPHILS ABSOLUTE COUNT: 5.3 K/UL (ref 1.8–7.7)
SODIUM BLD-SCNC: 140 MMOL/L (ref 135–144)
TOTAL PROTEIN: 6.5 G/DL (ref 6.4–8.3)
WBC # BLD: 9.9 K/UL (ref 3.5–11)
WBC # BLD: NORMAL 10*3/UL

## 2019-10-28 PROCEDURE — 80201 ASSAY OF TOPIRAMATE: CPT

## 2019-10-28 PROCEDURE — 99284 EMERGENCY DEPT VISIT MOD MDM: CPT

## 2019-10-28 PROCEDURE — 85025 COMPLETE CBC W/AUTO DIFF WBC: CPT

## 2019-10-28 PROCEDURE — 36415 COLL VENOUS BLD VENIPUNCTURE: CPT

## 2019-10-28 PROCEDURE — 80053 COMPREHEN METABOLIC PANEL: CPT

## 2019-10-28 PROCEDURE — 70450 CT HEAD/BRAIN W/O DYE: CPT

## 2019-10-28 PROCEDURE — 84703 CHORIONIC GONADOTROPIN ASSAY: CPT

## 2019-10-28 ASSESSMENT — ENCOUNTER SYMPTOMS
VOMITING: 0
BACK PAIN: 0
COUGH: 0
SHORTNESS OF BREATH: 0
CONSTIPATION: 0
EYE PAIN: 0
ABDOMINAL PAIN: 0
NAUSEA: 0

## 2019-10-30 ENCOUNTER — OFFICE VISIT (OUTPATIENT)
Dept: FAMILY MEDICINE CLINIC | Age: 29
End: 2019-10-30
Payer: COMMERCIAL

## 2019-10-30 VITALS — SYSTOLIC BLOOD PRESSURE: 122 MMHG | DIASTOLIC BLOOD PRESSURE: 84 MMHG | HEART RATE: 58 BPM | OXYGEN SATURATION: 96 %

## 2019-10-30 DIAGNOSIS — L30.9 DERMATITIS: ICD-10-CM

## 2019-10-30 DIAGNOSIS — M79.7 FIBROMYALGIA: ICD-10-CM

## 2019-10-30 DIAGNOSIS — R53.83 FATIGUE, UNSPECIFIED TYPE: Primary | ICD-10-CM

## 2019-10-30 LAB
T4 FREE: 0.75 NG/DL (ref 0.78–2.1)
TSH SERPL DL<=0.05 MIU/L-ACNC: 3.04 MIU/ML (ref 0.49–4.6)
VITAMIN D2, 25 HYDROXY: 25 NG/ML (ref 30–100)

## 2019-10-30 PROCEDURE — G8417 CALC BMI ABV UP PARAM F/U: HCPCS | Performed by: NURSE PRACTITIONER

## 2019-10-30 PROCEDURE — 1036F TOBACCO NON-USER: CPT | Performed by: NURSE PRACTITIONER

## 2019-10-30 PROCEDURE — G8484 FLU IMMUNIZE NO ADMIN: HCPCS | Performed by: NURSE PRACTITIONER

## 2019-10-30 PROCEDURE — 99214 OFFICE O/P EST MOD 30 MIN: CPT | Performed by: NURSE PRACTITIONER

## 2019-10-30 PROCEDURE — G8427 DOCREV CUR MEDS BY ELIG CLIN: HCPCS | Performed by: NURSE PRACTITIONER

## 2019-10-30 RX ORDER — AMITRIPTYLINE HYDROCHLORIDE 10 MG/1
10 TABLET, FILM COATED ORAL NIGHTLY
Qty: 30 TABLET | Refills: 2 | Status: SHIPPED | OUTPATIENT
Start: 2019-10-30 | End: 2020-07-08 | Stop reason: SINTOL

## 2019-10-31 LAB — TOPIRAMATE LEVEL: <1.5 UG/ML (ref 5–20)

## 2019-11-03 DIAGNOSIS — G44.229 CHRONIC TENSION-TYPE HEADACHE, NOT INTRACTABLE: ICD-10-CM

## 2019-11-03 DIAGNOSIS — E55.9 HYPOVITAMINOSIS D: Primary | ICD-10-CM

## 2019-11-03 RX ORDER — ERGOCALCIFEROL 1.25 MG/1
50000 CAPSULE ORAL WEEKLY
Qty: 4 CAPSULE | Refills: 2 | Status: SHIPPED | OUTPATIENT
Start: 2019-11-03 | End: 2020-09-28 | Stop reason: SDUPTHER

## 2019-11-08 ASSESSMENT — ENCOUNTER SYMPTOMS
WHEEZING: 0
ABDOMINAL PAIN: 0
CONSTIPATION: 0
COUGH: 0
EYES NEGATIVE: 1
SHORTNESS OF BREATH: 0
DIARRHEA: 0

## 2019-12-27 ENCOUNTER — OFFICE VISIT (OUTPATIENT)
Dept: PRIMARY CARE CLINIC | Age: 29
End: 2019-12-27
Payer: COMMERCIAL

## 2019-12-27 VITALS
DIASTOLIC BLOOD PRESSURE: 78 MMHG | WEIGHT: 260 LBS | OXYGEN SATURATION: 95 % | BODY MASS INDEX: 40.81 KG/M2 | HEART RATE: 75 BPM | TEMPERATURE: 98.2 F | HEIGHT: 67 IN | SYSTOLIC BLOOD PRESSURE: 110 MMHG

## 2019-12-27 DIAGNOSIS — H69.83 DYSFUNCTION OF BOTH EUSTACHIAN TUBES: Primary | ICD-10-CM

## 2019-12-27 PROCEDURE — 1036F TOBACCO NON-USER: CPT | Performed by: PHYSICIAN ASSISTANT

## 2019-12-27 PROCEDURE — 99213 OFFICE O/P EST LOW 20 MIN: CPT | Performed by: PHYSICIAN ASSISTANT

## 2019-12-27 PROCEDURE — G8427 DOCREV CUR MEDS BY ELIG CLIN: HCPCS | Performed by: PHYSICIAN ASSISTANT

## 2019-12-27 PROCEDURE — G8417 CALC BMI ABV UP PARAM F/U: HCPCS | Performed by: PHYSICIAN ASSISTANT

## 2019-12-27 PROCEDURE — G8484 FLU IMMUNIZE NO ADMIN: HCPCS | Performed by: PHYSICIAN ASSISTANT

## 2019-12-27 RX ORDER — PREDNISONE 20 MG/1
20 TABLET ORAL 2 TIMES DAILY
Qty: 10 TABLET | Refills: 0 | Status: SHIPPED | OUTPATIENT
Start: 2019-12-27 | End: 2020-01-01

## 2019-12-27 ASSESSMENT — ENCOUNTER SYMPTOMS
SORE THROAT: 1
COUGH: 1

## 2019-12-30 ENCOUNTER — OFFICE VISIT (OUTPATIENT)
Dept: FAMILY MEDICINE CLINIC | Age: 29
End: 2019-12-30
Payer: COMMERCIAL

## 2019-12-30 VITALS
HEART RATE: 78 BPM | BODY MASS INDEX: 41.59 KG/M2 | SYSTOLIC BLOOD PRESSURE: 128 MMHG | OXYGEN SATURATION: 98 % | DIASTOLIC BLOOD PRESSURE: 88 MMHG | TEMPERATURE: 98.7 F | HEIGHT: 66 IN | WEIGHT: 258.8 LBS

## 2019-12-30 DIAGNOSIS — K52.9 ACUTE GASTROENTERITIS: Primary | ICD-10-CM

## 2019-12-30 PROCEDURE — 1036F TOBACCO NON-USER: CPT | Performed by: NURSE PRACTITIONER

## 2019-12-30 PROCEDURE — G8484 FLU IMMUNIZE NO ADMIN: HCPCS | Performed by: NURSE PRACTITIONER

## 2019-12-30 PROCEDURE — G8427 DOCREV CUR MEDS BY ELIG CLIN: HCPCS | Performed by: NURSE PRACTITIONER

## 2019-12-30 PROCEDURE — G8417 CALC BMI ABV UP PARAM F/U: HCPCS | Performed by: NURSE PRACTITIONER

## 2019-12-30 PROCEDURE — 99213 OFFICE O/P EST LOW 20 MIN: CPT | Performed by: NURSE PRACTITIONER

## 2019-12-30 RX ORDER — PROMETHAZINE HYDROCHLORIDE 25 MG/1
25 TABLET ORAL EVERY 6 HOURS PRN
Qty: 30 TABLET | Refills: 0 | Status: SHIPPED | OUTPATIENT
Start: 2019-12-30 | End: 2020-06-11

## 2019-12-30 RX ORDER — DICYCLOMINE HYDROCHLORIDE 10 MG/1
10 CAPSULE ORAL 4 TIMES DAILY PRN
Qty: 40 CAPSULE | Refills: 0 | Status: SHIPPED | OUTPATIENT
Start: 2019-12-30 | End: 2020-07-08 | Stop reason: ALTCHOICE

## 2019-12-30 RX ORDER — PROMETHAZINE HYDROCHLORIDE 25 MG/ML
25 INJECTION, SOLUTION INTRAMUSCULAR; INTRAVENOUS ONCE
Status: COMPLETED | OUTPATIENT
Start: 2019-12-30 | End: 2019-12-30

## 2019-12-30 RX ADMIN — PROMETHAZINE HYDROCHLORIDE 25 MG: 25 INJECTION, SOLUTION INTRAMUSCULAR; INTRAVENOUS at 11:39

## 2019-12-30 ASSESSMENT — ENCOUNTER SYMPTOMS
VOMITING: 1
CHANGE IN BOWEL HABIT: 1
ABDOMINAL PAIN: 1
NAUSEA: 1

## 2020-02-12 ENCOUNTER — HOSPITAL ENCOUNTER (OUTPATIENT)
Age: 30
Setting detail: SPECIMEN
Discharge: HOME | End: 2020-02-12
Payer: COMMERCIAL

## 2020-02-12 ENCOUNTER — OFFICE VISIT (OUTPATIENT)
Dept: FAMILY MEDICINE CLINIC | Age: 30
End: 2020-02-12
Payer: COMMERCIAL

## 2020-02-12 VITALS
DIASTOLIC BLOOD PRESSURE: 74 MMHG | SYSTOLIC BLOOD PRESSURE: 128 MMHG | RESPIRATION RATE: 18 BRPM | BODY MASS INDEX: 39.71 KG/M2 | OXYGEN SATURATION: 98 % | TEMPERATURE: 98 F | HEART RATE: 81 BPM | WEIGHT: 246 LBS

## 2020-02-12 LAB
BILIRUBIN, POC: ABNORMAL
BLOOD URINE, POC: ABNORMAL
CLARITY, POC: CLEAR
COLOR, POC: ABNORMAL
GLUCOSE URINE, POC: NEGATIVE
KETONES, POC: 40
LEUKOCYTE EST, POC: ABNORMAL
NITRITE, POC: NEGATIVE
PH, POC: 5
PROTEIN, POC: ABNORMAL
SPECIFIC GRAVITY, POC: 1.01
UROBILINOGEN, POC: 0.2

## 2020-02-12 PROCEDURE — 87480 CANDIDA DNA DIR PROBE: CPT | Performed by: NURSE PRACTITIONER

## 2020-02-12 PROCEDURE — G8484 FLU IMMUNIZE NO ADMIN: HCPCS | Performed by: NURSE PRACTITIONER

## 2020-02-12 PROCEDURE — G8427 DOCREV CUR MEDS BY ELIG CLIN: HCPCS | Performed by: NURSE PRACTITIONER

## 2020-02-12 PROCEDURE — 87086 URINE CULTURE/COLONY COUNT: CPT

## 2020-02-12 PROCEDURE — 99211 OFF/OP EST MAY X REQ PHY/QHP: CPT | Performed by: NURSE PRACTITIONER

## 2020-02-12 PROCEDURE — 87491 CHLMYD TRACH DNA AMP PROBE: CPT

## 2020-02-12 PROCEDURE — 87660 TRICHOMONAS VAGIN DIR PROBE: CPT

## 2020-02-12 PROCEDURE — 87480 CANDIDA DNA DIR PROBE: CPT

## 2020-02-12 PROCEDURE — 87591 N.GONORRHOEAE DNA AMP PROB: CPT

## 2020-02-12 PROCEDURE — 1036F TOBACCO NON-USER: CPT | Performed by: NURSE PRACTITIONER

## 2020-02-12 PROCEDURE — 81002 URINALYSIS NONAUTO W/O SCOPE: CPT | Performed by: NURSE PRACTITIONER

## 2020-02-12 PROCEDURE — 87510 GARDNER VAG DNA DIR PROBE: CPT

## 2020-02-12 PROCEDURE — 99213 OFFICE O/P EST LOW 20 MIN: CPT | Performed by: NURSE PRACTITIONER

## 2020-02-12 PROCEDURE — 87086 URINE CULTURE/COLONY COUNT: CPT | Performed by: NURSE PRACTITIONER

## 2020-02-12 PROCEDURE — G8417 CALC BMI ABV UP PARAM F/U: HCPCS | Performed by: NURSE PRACTITIONER

## 2020-02-12 ASSESSMENT — ENCOUNTER SYMPTOMS
BACK PAIN: 1
NAUSEA: 1
CONSTIPATION: 1
ABDOMINAL PAIN: 1

## 2020-02-12 ASSESSMENT — PATIENT HEALTH QUESTIONNAIRE - PHQ9
SUM OF ALL RESPONSES TO PHQ QUESTIONS 1-9: 1
SUM OF ALL RESPONSES TO PHQ9 QUESTIONS 1 & 2: 1
2. FEELING DOWN, DEPRESSED OR HOPELESS: 1
SUM OF ALL RESPONSES TO PHQ QUESTIONS 1-9: 1
1. LITTLE INTEREST OR PLEASURE IN DOING THINGS: 0
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
## 2020-02-14 LAB
-: NORMAL
DIRECT EXAM: ABNORMAL
Lab: ABNORMAL
REASON FOR REJECTION: NORMAL
SPECIMEN DESCRIPTION: ABNORMAL
ZZ NTE CLEAN UP: ORDERED TEST: NORMAL
ZZ NTE WITH NAME CLEAN UP: SPECIMEN SOURCE: NORMAL

## 2020-02-14 RX ORDER — METRONIDAZOLE 500 MG/1
500 TABLET ORAL 2 TIMES DAILY
Qty: 20 TABLET | Refills: 0 | Status: SHIPPED | OUTPATIENT
Start: 2020-02-14 | End: 2020-02-24

## 2020-02-15 LAB
CULTURE: NORMAL
Lab: NORMAL
SPECIMEN DESCRIPTION: NORMAL

## 2020-02-22 ASSESSMENT — ENCOUNTER SYMPTOMS
DIARRHEA: 0
WHEEZING: 0
SHORTNESS OF BREATH: 0
COUGH: 0

## 2020-04-09 ENCOUNTER — VIRTUAL VISIT (OUTPATIENT)
Dept: FAMILY MEDICINE CLINIC | Age: 30
End: 2020-04-09
Payer: COMMERCIAL

## 2020-04-09 PROCEDURE — G8427 DOCREV CUR MEDS BY ELIG CLIN: HCPCS | Performed by: FAMILY MEDICINE

## 2020-04-09 PROCEDURE — 99213 OFFICE O/P EST LOW 20 MIN: CPT | Performed by: FAMILY MEDICINE

## 2020-04-09 RX ORDER — MOMETASONE FUROATE 50 UG/1
2 SPRAY, METERED NASAL DAILY
Qty: 1 INHALER | Refills: 3 | Status: SHIPPED | OUTPATIENT
Start: 2020-04-09 | End: 2020-07-08

## 2020-04-09 RX ORDER — TIZANIDINE 4 MG/1
4 TABLET ORAL 2 TIMES DAILY
Qty: 60 TABLET | Refills: 0 | Status: SHIPPED | OUTPATIENT
Start: 2020-04-09 | End: 2020-09-10 | Stop reason: SDUPTHER

## 2020-04-09 RX ORDER — AMOXICILLIN AND CLAVULANATE POTASSIUM 875; 125 MG/1; MG/1
1 TABLET, FILM COATED ORAL 2 TIMES DAILY
Qty: 20 TABLET | Refills: 0 | Status: SHIPPED | OUTPATIENT
Start: 2020-04-09 | End: 2020-04-19

## 2020-04-09 ASSESSMENT — ENCOUNTER SYMPTOMS
DIARRHEA: 0
VOMITING: 0
SORE THROAT: 0
RHINORRHEA: 1
COUGH: 0
ABDOMINAL PAIN: 0

## 2020-04-09 NOTE — PROGRESS NOTES
(AUGMENTIN) 875-125 MG per tablet; Take 1 tablet by mouth 2 times daily for 10 days  Dispense: 20 tablet; Refill: 0    Tizanidine refilled for covering provider. To schedule a follow up to discuss also with LT. Return in about 4 weeks (around 5/7/2020) for Medication recheck- LT. An  electronic signature was used to authenticate this note. --Yaritza Farr MD on 4/9/2020 at 11:02 AM        Pursuant to the emergency declaration under the [facility] waiver authority and the [employer] Act, this Virtual  Visit was conducted, with patient's consent, to reduce the patient's risk of exposure to COVID-19 and provide continuity of care for an established patient. Patient location: patient home  Provider location: provider clinic  Services were provided through a video synchronous discussion virtually to substitute for in-person clinic visit.

## 2020-04-21 ENCOUNTER — OFFICE VISIT (OUTPATIENT)
Dept: FAMILY MEDICINE CLINIC | Age: 30
End: 2020-04-21
Payer: COMMERCIAL

## 2020-04-21 VITALS
DIASTOLIC BLOOD PRESSURE: 86 MMHG | WEIGHT: 244 LBS | HEART RATE: 78 BPM | BODY MASS INDEX: 39.38 KG/M2 | SYSTOLIC BLOOD PRESSURE: 126 MMHG | OXYGEN SATURATION: 98 %

## 2020-04-21 PROCEDURE — 99213 OFFICE O/P EST LOW 20 MIN: CPT

## 2020-04-21 PROCEDURE — 1036F TOBACCO NON-USER: CPT | Performed by: NURSE PRACTITIONER

## 2020-04-21 PROCEDURE — G8427 DOCREV CUR MEDS BY ELIG CLIN: HCPCS | Performed by: NURSE PRACTITIONER

## 2020-04-21 PROCEDURE — 99213 OFFICE O/P EST LOW 20 MIN: CPT | Performed by: NURSE PRACTITIONER

## 2020-04-21 PROCEDURE — G8417 CALC BMI ABV UP PARAM F/U: HCPCS | Performed by: NURSE PRACTITIONER

## 2020-04-21 RX ORDER — FEXOFENADINE HCL 180 MG/1
180 TABLET ORAL DAILY
Qty: 30 TABLET | Refills: 5 | Status: SHIPPED | OUTPATIENT
Start: 2020-04-21 | End: 2021-07-09

## 2020-04-21 ASSESSMENT — ENCOUNTER SYMPTOMS
SORE THROAT: 0
ABDOMINAL PAIN: 1
COUGH: 0
CONSTIPATION: 1
DIARRHEA: 1
NAUSEA: 1
RHINORRHEA: 0
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
## 2020-04-25 PROBLEM — G89.4 CHRONIC PAIN SYNDROME: Status: ACTIVE | Noted: 2020-04-25

## 2020-04-25 ASSESSMENT — ENCOUNTER SYMPTOMS
SHORTNESS OF BREATH: 0
VOMITING: 0
WHEEZING: 0
EYE ITCHING: 1

## 2020-06-11 ENCOUNTER — OFFICE VISIT (OUTPATIENT)
Dept: FAMILY MEDICINE CLINIC | Age: 30
End: 2020-06-11
Payer: COMMERCIAL

## 2020-06-11 VITALS
BODY MASS INDEX: 38.9 KG/M2 | DIASTOLIC BLOOD PRESSURE: 84 MMHG | HEART RATE: 66 BPM | SYSTOLIC BLOOD PRESSURE: 136 MMHG | OXYGEN SATURATION: 100 % | WEIGHT: 241 LBS

## 2020-06-11 PROCEDURE — G8427 DOCREV CUR MEDS BY ELIG CLIN: HCPCS | Performed by: NURSE PRACTITIONER

## 2020-06-11 PROCEDURE — G8417 CALC BMI ABV UP PARAM F/U: HCPCS | Performed by: NURSE PRACTITIONER

## 2020-06-11 PROCEDURE — 99211 OFF/OP EST MAY X REQ PHY/QHP: CPT

## 2020-06-11 PROCEDURE — 1036F TOBACCO NON-USER: CPT | Performed by: NURSE PRACTITIONER

## 2020-06-11 PROCEDURE — 99214 OFFICE O/P EST MOD 30 MIN: CPT | Performed by: NURSE PRACTITIONER

## 2020-06-11 RX ORDER — FLUCONAZOLE 150 MG/1
TABLET ORAL
Qty: 2 TABLET | Refills: 0 | Status: SHIPPED | OUTPATIENT
Start: 2020-06-11 | End: 2020-06-14

## 2020-06-11 RX ORDER — AMOXICILLIN 875 MG/1
875 TABLET, COATED ORAL 2 TIMES DAILY
Qty: 20 TABLET | Refills: 0 | Status: SHIPPED | OUTPATIENT
Start: 2020-06-11 | End: 2020-10-21 | Stop reason: SDUPTHER
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
## 2020-06-20 ASSESSMENT — ENCOUNTER SYMPTOMS
DIARRHEA: 0
SHORTNESS OF BREATH: 0
BOWEL INCONTINENCE: 0
BACK PAIN: 1
WHEEZING: 0
SORE THROAT: 0
COUGH: 0
RHINORRHEA: 0

## 2020-06-22 ENCOUNTER — OFFICE VISIT (OUTPATIENT)
Dept: PRIMARY CARE CLINIC | Age: 30
End: 2020-06-22
Payer: COMMERCIAL

## 2020-06-22 VITALS
BODY MASS INDEX: 38.25 KG/M2 | WEIGHT: 237 LBS | OXYGEN SATURATION: 98 % | DIASTOLIC BLOOD PRESSURE: 80 MMHG | TEMPERATURE: 98 F | SYSTOLIC BLOOD PRESSURE: 116 MMHG | HEART RATE: 100 BPM

## 2020-06-22 PROCEDURE — 1036F TOBACCO NON-USER: CPT | Performed by: FAMILY MEDICINE

## 2020-06-22 PROCEDURE — 99212 OFFICE O/P EST SF 10 MIN: CPT

## 2020-06-22 PROCEDURE — G8417 CALC BMI ABV UP PARAM F/U: HCPCS | Performed by: FAMILY MEDICINE

## 2020-06-22 PROCEDURE — G8427 DOCREV CUR MEDS BY ELIG CLIN: HCPCS | Performed by: FAMILY MEDICINE

## 2020-06-22 PROCEDURE — 99213 OFFICE O/P EST LOW 20 MIN: CPT | Performed by: FAMILY MEDICINE

## 2020-06-22 ASSESSMENT — ENCOUNTER SYMPTOMS
BLOOD IN STOOL: 0
RECTAL PAIN: 0
VOMITING: 1
ABDOMINAL PAIN: 0
NAUSEA: 1
EYES NEGATIVE: 1
DIARRHEA: 1
RESPIRATORY NEGATIVE: 1
ALLERGIC/IMMUNOLOGIC NEGATIVE: 1

## 2020-06-22 ASSESSMENT — PATIENT HEALTH QUESTIONNAIRE - PHQ9
SUM OF ALL RESPONSES TO PHQ9 QUESTIONS 1 & 2: 0
SUM OF ALL RESPONSES TO PHQ QUESTIONS 1-9: 0
2. FEELING DOWN, DEPRESSED OR HOPELESS: 0
SUM OF ALL RESPONSES TO PHQ QUESTIONS 1-9: 0
1. LITTLE INTEREST OR PLEASURE IN DOING THINGS: 0

## 2020-06-22 NOTE — PROGRESS NOTES
in stool and rectal pain. Endocrine: Negative. Genitourinary: Negative. Musculoskeletal: Negative. Skin: Negative. Allergic/Immunologic: Negative. Neurological: Negative. Hematological: Negative. Psychiatric/Behavioral: Negative. Prior to Visit Medications    Medication Sig Taking?  Authorizing Provider   tiZANidine (ZANAFLEX) 4 MG tablet Take 1 tablet by mouth 2 times daily Muscle spasms Yes Sonia Camargo MD   dicyclomine (BENTYL) 10 MG capsule Take 1 capsule by mouth 4 times daily as needed (vomiting , cramping.) Yes Estrella Anders RN   vitamin D (ERGOCALCIFEROL) 1.25 MG (20035 UT) CAPS capsule Take 1 capsule by mouth once a week Yes SUE Quach CNP   amitriptyline (ELAVIL) 10 MG tablet Take 1 tablet by mouth nightly Yes SUE Quach CNP   DULoxetine (CYMBALTA) 30 MG extended release capsule take 1 capsule by mouth once daily  Patient taking differently: every other day  Yes SUE Quach CNP   DULoxetine (CYMBALTA) 60 MG extended release capsule Take 1 capsule by mouth daily  Patient taking differently: Take 60 mg by mouth every other day  Yes SUE Quach CNP   omeprazole (PRILOSEC) 40 MG delayed release capsule Take 1 capsule by mouth 2 times daily Yes SUE Quach CNP   ondansetron (ZOFRAN ODT) 4 MG disintegrating tablet Take 1 tablet by mouth every 8 hours as needed for Nausea Yes SUE Quach CNP   topiramate (TOPAMAX) 50 MG tablet take 1 tablet by mouth twice a day Yes SUE Quach CNP   azelastine (ASTELIN) 0.1 % nasal spray 1 spray by Nasal route 2 times daily Use in each nostril as directed Yes Historical Provider, MD   fexofenadine (ALLEGRA) 180 MG tablet Take 1 tablet by mouth daily  Patient not taking: Reported on 6/22/2020  SUE Quach CNP   mometasone (NASONEX) 50 MCG/ACT nasal spray 2 sprays by Each Nostril route daily  Patient not taking: Reported on 6/22/2020  Sonia Camargo MD warm and dry. Findings: No erythema or rash. Neurological:      Mental Status: She is alert and oriented to person, place, and time. Psychiatric:         Behavior: Behavior normal.         Thought Content: Thought content normal.         Judgment: Judgment normal.     /80 (Site: Right Upper Arm, Position: Sitting, Cuff Size: Large Adult)   Pulse 100   Temp 98 °F (36.7 °C) (Tympanic)   Wt 237 lb (107.5 kg)   LMP 06/22/2020   SpO2 98%   BMI 38.25 kg/m²       ASSESSMENT/PLAN:  Encounter Diagnosis   Name Primary?  Gastroenteritis Yes     Signs and symptoms of gastroenteritis. No fever, blood, or significant abdominal pain at present. Recent antibiotics with symptoms starting right after completed over 10 days. 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 They are all quarantined due to covid 19 and none appear ill. Rec. Checking stool cultures including c.diff early on given frequency and persistence of symptoms. Discussed typical course, supportive care, and complications  Increased fluids, zofran not helping as much right now. Rec. BRAT diet and gatorade. Recheck for intractable vomiting, fever, bleeding, abdominal pain or other concerns. Work note Saturday -wed. An electronic signature was used to authenticate this note.     --Fernando Spatz, MD on 6/22/2020 at 2:32 PM

## 2020-07-08 ENCOUNTER — OFFICE VISIT (OUTPATIENT)
Dept: FAMILY MEDICINE CLINIC | Age: 30
End: 2020-07-08
Payer: COMMERCIAL

## 2020-07-08 VITALS
SYSTOLIC BLOOD PRESSURE: 124 MMHG | BODY MASS INDEX: 37.28 KG/M2 | HEART RATE: 90 BPM | DIASTOLIC BLOOD PRESSURE: 84 MMHG | WEIGHT: 231 LBS | OXYGEN SATURATION: 98 %

## 2020-07-08 LAB
ALBUMIN/GLOBULIN RATIO: 1.7 G/DL
ALBUMIN: 4.5 G/DL (ref 3.5–5)
ALP BLD-CCNC: 70 UNITS/L (ref 38–126)
ALT SERPL-CCNC: 14 UNITS/L (ref 4–35)
ANION GAP SERPL CALCULATED.3IONS-SCNC: 8.7 MMOL/L
AST SERPL-CCNC: 16 UNITS/L (ref 14–36)
BASOPHILS %: 1.62 (ref 0–3)
BASOPHILS ABSOLUTE: 0.13 (ref 0–0.3)
BILIRUB SERPL-MCNC: 0.6 MG/DL (ref 0.2–1.3)
BUN BLDV-MCNC: 11 MG/DL (ref 7–17)
C-REACTIVE PROTEIN: < 0.5 MG/DL (ref 0–1)
CALCIUM SERPL-MCNC: 9.5 MG/DL (ref 8.4–10.2)
CHLORIDE BLD-SCNC: 104 MMOL/L (ref 98–120)
CO2: 25 MMOL/L (ref 22–31)
CREAT SERPL-MCNC: 0.7 MG/DL (ref 0.5–1)
EOSINOPHILS %: 3.15 (ref 0–10)
EOSINOPHILS ABSOLUTE: 0.26 (ref 0–1.1)
GFR CALCULATED: > 60
GLOBULIN: 2.7 G/DL
GLUCOSE: 94 MG/DL (ref 65–105)
HCT VFR BLD CALC: 43.5 % (ref 37–47)
HEMOGLOBIN: 14.6 (ref 12–16)
LYMPHOCYTE %: 37.04 (ref 20–51.1)
LYMPHOCYTES ABSOLUTE: 3.03 (ref 1–5.5)
MCH RBC QN AUTO: 30.5 PG (ref 28.5–32.5)
MCHC RBC AUTO-ENTMCNC: 33.6 G/DL (ref 32–37)
MCV RBC AUTO: 90.7 FL (ref 80–94)
MONOCYTES %: 7.5 (ref 1.7–9.3)
MONOCYTES ABSOLUTE: 0.61 (ref 0.1–1)
NEUTROPHILS %: 50.69 (ref 42.2–75.2)
NEUTROPHILS ABSOLUTE: 4.15 (ref 2–8.1)
PDW BLD-RTO: 11.3 % (ref 8.5–15.5)
PLATELET # BLD: 385.6 THOU/MM3 (ref 130–400)
POTASSIUM SERPL-SCNC: 3.8 MMOL/L (ref 3.6–5)
RBC: 4.8 M/UL (ref 4.2–5.4)
SEDIMENTATION RATE, ERYTHROCYTE: 5 MM/HR (ref 0–30)
SODIUM BLD-SCNC: 138 MMOL/L (ref 135–145)
TOTAL PROTEIN, SERUM: 7.2 G/DL (ref 6.3–8.2)
WBC: 8.2 THOU/ML3 (ref 4.8–10.8)

## 2020-07-08 PROCEDURE — G8427 DOCREV CUR MEDS BY ELIG CLIN: HCPCS | Performed by: NURSE PRACTITIONER

## 2020-07-08 PROCEDURE — G8417 CALC BMI ABV UP PARAM F/U: HCPCS | Performed by: NURSE PRACTITIONER

## 2020-07-08 PROCEDURE — 99213 OFFICE O/P EST LOW 20 MIN: CPT | Performed by: NURSE PRACTITIONER

## 2020-07-08 PROCEDURE — 1036F TOBACCO NON-USER: CPT | Performed by: NURSE PRACTITIONER

## 2020-07-08 ASSESSMENT — PATIENT HEALTH QUESTIONNAIRE - PHQ9
SUM OF ALL RESPONSES TO PHQ9 QUESTIONS 1 & 2: 1
1. LITTLE INTEREST OR PLEASURE IN DOING THINGS: 0
SUM OF ALL RESPONSES TO PHQ QUESTIONS 1-9: 1
2. FEELING DOWN, DEPRESSED OR HOPELESS: 1
SUM OF ALL RESPONSES TO PHQ QUESTIONS 1-9: 1
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
## 2020-07-10 LAB — ANA SCREEN: NEGATIVE

## 2020-07-11 PROBLEM — R20.2 NUMBNESS AND TINGLING: Status: ACTIVE | Noted: 2020-07-11

## 2020-07-11 PROBLEM — R20.0 NUMBNESS AND TINGLING: Status: ACTIVE | Noted: 2020-07-11

## 2020-07-11 ASSESSMENT — ENCOUNTER SYMPTOMS
DIARRHEA: 0
COUGH: 0
CONSTIPATION: 0
SHORTNESS OF BREATH: 0
ABDOMINAL PAIN: 0
EYES NEGATIVE: 1
WHEEZING: 0

## 2020-07-13 LAB
FOLATE: 16.4 NG/ML (ref 2.8–20)
TSH SERPL DL<=0.05 MIU/L-ACNC: 0.21 MIU/ML (ref 0.49–4.67)
VITAMIN B-12: 436 PG/ML (ref 239–931)
VITAMIN B1 WHOLE BLOOD: 111
VITAMIN B6: 97.6

## 2020-07-28 ENCOUNTER — VIRTUAL VISIT (OUTPATIENT)
Dept: FAMILY MEDICINE CLINIC | Age: 30
End: 2020-07-28
Payer: COMMERCIAL

## 2020-07-28 PROCEDURE — 99213 OFFICE O/P EST LOW 20 MIN: CPT | Performed by: NURSE PRACTITIONER

## 2020-07-28 PROCEDURE — G8427 DOCREV CUR MEDS BY ELIG CLIN: HCPCS | Performed by: NURSE PRACTITIONER

## 2020-07-28 ASSESSMENT — ENCOUNTER SYMPTOMS
SHORTNESS OF BREATH: 0
COUGH: 1
WHEEZING: 0
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
## 2020-07-31 ASSESSMENT — ENCOUNTER SYMPTOMS
NAUSEA: 0
SORE THROAT: 0
ABDOMINAL PAIN: 0

## 2020-08-04 ENCOUNTER — HOSPITAL ENCOUNTER (OUTPATIENT)
Age: 30
Discharge: HOME | End: 2020-08-04
Payer: COMMERCIAL

## 2020-08-04 ENCOUNTER — OFFICE VISIT (OUTPATIENT)
Dept: PRIMARY CARE CLINIC | Age: 30
End: 2020-08-04
Payer: COMMERCIAL

## 2020-08-04 VITALS
SYSTOLIC BLOOD PRESSURE: 114 MMHG | TEMPERATURE: 98.1 F | DIASTOLIC BLOOD PRESSURE: 86 MMHG | HEIGHT: 67 IN | HEART RATE: 96 BPM | OXYGEN SATURATION: 98 % | WEIGHT: 230 LBS | BODY MASS INDEX: 36.1 KG/M2

## 2020-08-04 LAB — S PYO AG THROAT QL: NORMAL

## 2020-08-04 PROCEDURE — 99213 OFFICE O/P EST LOW 20 MIN: CPT | Performed by: NURSE PRACTITIONER

## 2020-08-04 PROCEDURE — 99212 OFFICE O/P EST SF 10 MIN: CPT

## 2020-08-04 PROCEDURE — U0003 INFECTIOUS AGENT DETECTION BY NUCLEIC ACID (DNA OR RNA); SEVERE ACUTE RESPIRATORY SYNDROME CORONAVIRUS 2 (SARS-COV-2) (CORONAVIRUS DISEASE [COVID-19]), AMPLIFIED PROBE TECHNIQUE, MAKING USE OF HIGH THROUGHPUT TECHNOLOGIES AS DESCRIBED BY CMS-2020-01-R: HCPCS

## 2020-08-04 PROCEDURE — 4130F TOPICAL PREP RX AOE: CPT | Performed by: NURSE PRACTITIONER

## 2020-08-04 PROCEDURE — G8427 DOCREV CUR MEDS BY ELIG CLIN: HCPCS | Performed by: NURSE PRACTITIONER

## 2020-08-04 PROCEDURE — 87651 STREP A DNA AMP PROBE: CPT

## 2020-08-04 PROCEDURE — 87880 STREP A ASSAY W/OPTIC: CPT | Performed by: NURSE PRACTITIONER

## 2020-08-04 PROCEDURE — G8417 CALC BMI ABV UP PARAM F/U: HCPCS | Performed by: NURSE PRACTITIONER

## 2020-08-04 PROCEDURE — 4004F PT TOBACCO SCREEN RCVD TLK: CPT | Performed by: NURSE PRACTITIONER

## 2020-08-04 ASSESSMENT — ENCOUNTER SYMPTOMS
VOMITING: 0
SINUS PRESSURE: 0
RESPIRATORY NEGATIVE: 1
ABDOMINAL PAIN: 0
COUGH: 0
RHINORRHEA: 0
NAUSEA: 1
DIARRHEA: 0
SORE THROAT: 1

## 2020-08-04 NOTE — PROGRESS NOTES
[facility]             [address], [address]                        Telephone [phone]             Fax [phone] Aleja Bryan  1990  MUK:[id number]   Date of visit:  8/4/2020    Subjective:    Naomi Quiroz is a 30 y.o.  female who presents to [facility] today (8/4/2020) for evaluation of:    Chief Complaint   Patient presents with    Otalgia     Bilat ear pain. X 7 days    Pharyngitis       Otalgia    There is pain in both ears. This is a new problem. The current episode started in the past 7 days (X 4 days). The problem occurs constantly. The problem has been gradually worsening. Maximum temperature: low grade fever for 1 day; now resolved. The pain is at a severity of 6/10. Associated symptoms include a sore throat (6/10). Pertinent negatives include no abdominal pain, coughing, diarrhea, headaches, rash, rhinorrhea or vomiting. Associated symptoms comments: Post nasal drainage, nausea. She has tried nothing for the symptoms. The treatment provided no relief. Pharyngitis   This is a new problem. The current episode started in the past 7 days (X 2 days ago). The problem occurs constantly. The problem has been gradually worsening. Associated symptoms include congestion, nausea and a sore throat (6/10). Pertinent negatives include no abdominal pain, chest pain, chills, coughing, fatigue, fever, headaches, myalgias, rash or vomiting. The symptoms are aggravated by swallowing. She has tried nothing for the symptoms. The treatment provided no relief.        She has the following problem list:  Patient Active Problem List   Diagnosis    Keratitis    Myopia of both eyes with astigmatism    Obesity due to excess calories    Polycystic ovaries    Endometriosis    Dysfunctional uterine bleeding    Chronic middle ear effusion, bilateral    Diarrhea    Abdominal pain    Non-intractable vomiting with nausea    Chronic tension-type headache, not intractable    Cyclical vomiting syndrome    Fibromyalgia    Chondromalacia patellae    Functional dyspepsia    Lumbosacral spondylosis without myelopathy    Pain in joint    Chronic pain syndrome    Numbness and tingling        Current medications are:  Current Outpatient Medications   Medication Sig Dispense Refill    ciprofloxacin-dexamethasone (CIPRODEX) 0.3-0.1 % otic suspension Place 4 drops into both ears 2 times daily for 7 days 1 Bottle 0    fexofenadine (ALLEGRA) 180 MG tablet Take 1 tablet by mouth daily 30 tablet 5    tiZANidine (ZANAFLEX) 4 MG tablet Take 1 tablet by mouth 2 times daily Muscle spasms 60 tablet 0    vitamin D (ERGOCALCIFEROL) 1.25 MG (24958 UT) CAPS capsule Take 1 capsule by mouth once a week 4 capsule 2    omeprazole (PRILOSEC) 40 MG delayed release capsule Take 1 capsule by mouth 2 times daily 60 capsule 2    ondansetron (ZOFRAN ODT) 4 MG disintegrating tablet Take 1 tablet by mouth every 8 hours as needed for Nausea 20 tablet 0    montelukast (SINGULAIR) 10 MG tablet take 1 tablet by mouth at bedtime 30 tablet 5    topiramate (TOPAMAX) 50 MG tablet take 1 tablet by mouth twice a day 60 tablet 2    azelastine (ASTELIN) 0.1 % nasal spray 1 spray by Nasal route 2 times daily Use in each nostril as directed       No current facility-administered medications for this visit. She is allergic to gabapentin and pregabalin. .    She  reports that she has been smoking. She has a 4.50 pack-year smoking history. She has never used smokeless tobacco.      Objective:    Vitals:    08/04/20 1625   BP: 114/86   Site: Left Upper Arm   Pulse: 96   Temp: 98.1 °F (36.7 °C)   TempSrc: Tympanic   SpO2: 98%   Weight: 230 lb (104.3 kg)   Height: 5' 7\" (1.702 m)     Body mass index is 36.02 kg/m². Review of Systems   Constitutional: Positive for appetite change. Negative for chills, fatigue and fever.    HENT: Positive for congestion, ear pain and sore throat (6/10). Negative for rhinorrhea and sinus pressure. Respiratory: Negative. Negative for cough. Cardiovascular: Negative. Negative for chest pain. Gastrointestinal: Positive for nausea. Negative for abdominal pain, diarrhea and vomiting. Musculoskeletal: Negative for myalgias. Skin: Negative for rash. Neurological: Negative for headaches. Physical Exam  Vitals signs and nursing note reviewed. Constitutional:       Appearance: She is well-developed. HENT:      Head: Normocephalic. Jaw: There is normal jaw occlusion. Right Ear: Tympanic membrane, ear canal and external ear normal. Swelling (and erythema of canal) present. No tenderness. Left Ear: Tympanic membrane, ear canal and external ear normal. Swelling (and erythema of canal) and tenderness (of canal) present. Nose: Congestion present. Right Turbinates: Swollen (erythema). Left Turbinates: Swollen (erythema). Right Sinus: No maxillary sinus tenderness or frontal sinus tenderness. Left Sinus: No maxillary sinus tenderness or frontal sinus tenderness. Mouth/Throat:      Lips: Pink. Mouth: Mucous membranes are moist.      Pharynx: Uvula midline. Posterior oropharyngeal erythema present. Tonsils: 1+ on the right. 1+ on the left. Eyes:      Pupils: Pupils are equal, round, and reactive to light. Neck:      Musculoskeletal: Normal range of motion and neck supple. Cardiovascular:      Rate and Rhythm: Normal rate and regular rhythm. Heart sounds: Normal heart sounds. Pulmonary:      Effort: Pulmonary effort is normal.      Breath sounds: Normal breath sounds and air entry. Lymphadenopathy:      Head:      Right side of head: Tonsillar adenopathy present. Left side of head: Tonsillar adenopathy present. Skin:     General: Skin is warm and dry. Neurological:      Mental Status: She is alert and oriented to person, place, and time.

## 2020-08-04 NOTE — PATIENT INSTRUCTIONS
if you are sick or have been exposed to the virus. Don't go to school, work, or public areas. And don't use public transportation, ride-shares, or taxis unless you have no choice. · If you are sick:  ? Leave your home only if you need to get medical care. But call the doctor's office first so they know you're coming. And wear a face cover. ? Wear the face cover whenever you're around other people. It can help stop the spread of the virus when you cough or sneeze. ? Clean and disinfect your home every day. Use household  and disinfectant wipes or sprays. Take special care to clean things that you grab with your hands. These include doorknobs, remote controls, phones, and handles on your refrigerator and microwave. And don't forget countertops, tabletops, bathrooms, and computer keyboards. When to call for help  [id number] anytime you think you may need emergency care. For example, call if:  · You have severe trouble breathing. (You can't talk at all.)  · You have constant chest pain or pressure. · You are severely dizzy or lightheaded. · You are confused or can't think clearly. · Your face and lips have a blue color. · You pass out (lose consciousness) or are very hard to wake up. Call your doctor now if you develop symptoms such as:  · Shortness of breath. · Fever. · Cough. If you need to get care, call ahead to the doctor's office for instructions before you go. Make sure you wear a face cover to prevent exposing other people to the virus. Where can you get the latest information? The following health organizations are tracking and studying this virus. Their websites contain the most up-to-date information. [place] also learn what to do if you think you may have been exposed to the virus. · U.S. Centers for Disease Control and Prevention (CDC): The CDC provides updated news about the disease and travel advice. The website also tells you how to prevent the spread of infection.  www.cdc.gov  · World Health Organization [facility]): WHO offers information about the virus outbreaks. WHO also has travel advice. www.who.int  Current as of: May 8, 2020               Content Version: 12.5  © 2006-2020 Healthwise, Incorporated. Care instructions adapted under license by [facility] [place] ([facility]). If you have questions about a medical condition or this instruction, always ask your healthcare professional. [address] any warranty or liability for your use of this information. Patient Education        Coronavirus ([id number]): Care Instructions  Overview  The coronavirus disease (COVID-19) is caused by a virus. Symptoms may include a fever, a cough, and shortness of breath. It mainly spreads person-to-person through droplets from coughing and sneezing. The virus also can spread when people are in close contact with someone who is infected. Most people have mild symptoms and can take care of themselves at home. If their symptoms get worse, they may need care in a hospital. There is no medicine to fight the virus. It's important to not spread the virus to others. If you have COVID-19, wear a face cover anytime you are around other people. You need to isolate yourself while you are sick. Your doctor or local public health official will tell you when you no longer need to be isolated. Leave your home only if you need to get medical care. Follow-up care is a key part of your treatment and safety. Be sure to make and go to all appointments, and call your doctor if you are having problems. It's also a good idea to know your test results and keep a list of the medicines you take. How can you care for yourself at home? · Get extra rest. It can help you feel better. · Drink plenty of fluids. This helps replace fluids lost from fever. Fluids also help ease a scratchy throat. Water, soup, fruit juice, and hot tea with lemon are good choices. · Take acetaminophen (such as Tylenol) to reduce a fever.  It may also help with muscle aches. Read and follow all instructions on the label. · Sponge your body with lukewarm water to help with fever. Don't use cold water or ice. · Use petroleum jelly on sore skin. This can help if the skin around your nose and lips becomes sore from rubbing a lot with tissues. Tips for isolation  · Wear a cloth face cover when you are around other people. It can help stop the spread of the virus when you cough or sneeze. · Limit contact with people in your home. If possible, stay in a separate bedroom and use a separate bathroom. · If you have to leave home, avoid crowds and try to stay at least 6 feet away from other people. · Avoid contact with pets and other animals. · Cover your mouth and nose with a tissue when you cough or sneeze. Then throw it in the trash right away. · Wash your hands often, especially after you cough or sneeze. Use soap and water, and scrub for at least 20 seconds. If soap and water aren't available, use an alcohol-based hand . · Don't share personal household items. These include bedding, towels, cups and glasses, and eating utensils. · [address] in the warmest water allowed for the fabric type, and dry it completely. It's okay to wash other people's laundry with yours. · Clean and disinfect your home every day. Use household  and disinfectant wipes or sprays. Take special care to clean things that you grab with your hands. These include doorknobs, remote controls, phones, and handles on your refrigerator and microwave. And don't forget countertops, tabletops, bathrooms, and computer keyboards. When should you call for help? [id number] anytime you think you may need emergency care. For example, call if you have life-threatening symptoms, such as:  · You have severe trouble breathing. (You can't talk at all.)  · You have constant chest pain or pressure. · You are severely dizzy or lightheaded. · You are confused or can't think clearly.   · Your Healthwise, Incorporated. Care instructions adapted under license by [facility] ([facility]). If you have questions about a medical condition or this instruction, always ask your healthcare professional. [address] any warranty or liability for your use of this information. Patient Education        Viral Respiratory Infection: Care Instructions  Your Care Instructions     Viruses are very small organisms. They grow in number after they enter your body. There are many types that cause different illnesses, such as colds and the mumps. The symptoms of a viral respiratory infection often start quickly. They include a fever, sore throat, and runny nose. You may also just not feel well. Or you may not want to eat much. Most viral respiratory infections are not serious. They usually get better with time and self-care. Antibiotics are not used to treat a viral infection. That's because antibiotics will not help cure a viral illness. In some cases, antiviral medicine can help your body fight a serious viral infection. Follow-up care is a key part of your treatment and safety. Be sure to make and go to all appointments, and call your doctor if you are having problems. It's also a good idea to know your test results and keep a list of the medicines you take. How can you care for yourself at home? · Rest as much as possible until you feel better. · Be safe with medicines. Take your medicine exactly as prescribed. Call your doctor if you think you are having a problem with your medicine. You will get more details on the specific medicine your doctor prescribes. · Take an over-the-counter pain medicine, such as acetaminophen (Tylenol), ibuprofen (Advil, Motrin), or naproxen (Aleve), as needed for pain and fever. Read and follow all instructions on the label. Do not give aspirin to anyone younger than 20. It has been linked to Reye syndrome, a serious illness.   · Drink plenty of fluids, enough so Healthwise, Incorporated. Care instructions adapted under license by [facility] ([facility]). If you have questions about a medical condition or this instruction, always ask your healthcare professional. [address] any warranty or liability for your use of this information. Patient Education        Sore Throat: Care Instructions  Your Care Instructions     Infection by bacteria or a virus causes most sore throats. Cigarette smoke, dry air, air pollution, allergies, and yelling can also cause a sore throat. Sore throats can be painful and annoying. Fortunately, most sore throats go away on their own. If you have a bacterial infection, your doctor may prescribe antibiotics. Follow-up care is a key part of your treatment and safety. Be sure to make and go to all appointments, and call your doctor if you are having problems. It's also a good idea to know your test results and keep a list of the medicines you take. How can you care for yourself at home? · If your doctor prescribed antibiotics, take them as directed. Do not stop taking them just because you feel better. You need to take the full course of antibiotics. · Gargle with warm salt water once an hour to help reduce swelling and relieve discomfort. Use 1 teaspoon of salt mixed in 1 cup of warm water. · Take an over-the-counter pain medicine, such as acetaminophen (Tylenol), ibuprofen (Advil, Motrin), or naproxen (Aleve). Read and follow all instructions on the label. · Be careful when taking over-the-counter cold or flu medicines and Tylenol at the same time. Many of these medicines have acetaminophen, which is Tylenol. Read the labels to make sure that you are not taking more than the recommended dose. Too much acetaminophen (Tylenol) can be harmful. · Drink plenty of fluids. Fluids may help soothe an irritated throat. Hot fluids, such as tea or soup, may help decrease throat pain.   · Use over-the-counter throat lozenges to soothe pain. Regular cough drops or hard candy may also help. These should not be given to young children because of the risk of choking. · Do not smoke or allow others to smoke around you. If you need help quitting, talk to your doctor about stop-smoking programs and medicines. These can increase your chances of quitting for good. · Use a vaporizer or humidifier to add moisture to your bedroom. Follow the directions for cleaning the machine. When should you call for help? Call your doctor now or seek immediate medical care if:  · You have new or worse trouble swallowing. · Your sore throat gets much worse on one side. Watch closely for changes in your health, and be sure to contact your doctor if you do not get better as expected. Where can you learn more? Go to https://[employer]na. org and sign in to your [employer] account. Enter Z964 in the P[employer]way box to learn more about \"Sore Throat: Care Instructions. \"     If you do not have an account, please click on the \"Sign Up Now\" link. Current as of: July 29, 2019               Content Version: 12.5  © 6169-8051 Healthwise, Incorporated. Care instructions adapted under license by [facility] [place] ([facility]). If you have questions about a medical condition or this instruction, always ask your healthcare professional. [address] any warranty or liability for your use of this information.

## 2020-08-06 LAB
DIRECT EXAM: NORMAL
Lab: NORMAL
SPECIMEN DESCRIPTION: NORMAL

## 2020-08-08 LAB — SARS-COV-2, NAA: NOT DETECTED

## 2020-08-17 RX ORDER — ERGOCALCIFEROL 1.25 MG/1
CAPSULE ORAL
Qty: 4 CAPSULE | Refills: 2 | OUTPATIENT
Start: 2020-08-17

## 2020-08-17 NOTE — TELEPHONE ENCOUNTER
Jose Robles is requesting a refill on the following medication(s):  Requested Prescriptions     Pending Prescriptions Disp Refills    vitamin D (ERGOCALCIFEROL) 1.25 MG (21516 UT) CAPS capsule [Pharmacy Med Name: VITAMIN D2 1.25MG(50,000 UNIT)] 4 capsule 2     Sig: take 1 capsule by mouth every week       Last Visit Date (If Applicable):  4/93/7554    Next Visit Date:    Visit date not found

## 2020-09-09 NOTE — TELEPHONE ENCOUNTER
Jacob Oropeza is calling to request a refill on the following medication(s):  Requested Prescriptions     Pending Prescriptions Disp Refills    tiZANidine (ZANAFLEX) 4 MG tablet 60 tablet 0     Sig: Take 1 tablet by mouth 2 times daily Muscle spasms    loratadine (CLARITIN) 10 MG tablet 90 tablet 1     Sig: Take 1 tablet by mouth daily    omeprazole (PRILOSEC) 40 MG delayed release capsule 60 capsule 2     Sig: Take 1 capsule by mouth 2 times daily       Last Visit Date (If Applicable):  3/96/9328    Next Visit Date:    Visit date not found

## 2020-09-10 RX ORDER — LORATADINE 10 MG/1
10 TABLET ORAL DAILY
Qty: 90 TABLET | Refills: 1 | Status: SHIPPED | OUTPATIENT
Start: 2020-09-10 | End: 2021-07-09

## 2020-09-10 RX ORDER — OMEPRAZOLE 40 MG/1
40 CAPSULE, DELAYED RELEASE ORAL 2 TIMES DAILY
Qty: 60 CAPSULE | Refills: 2 | Status: SHIPPED | OUTPATIENT
Start: 2020-09-10 | End: 2021-03-17

## 2020-09-10 RX ORDER — TIZANIDINE 4 MG/1
4 TABLET ORAL 2 TIMES DAILY
Qty: 60 TABLET | Refills: 0 | Status: SHIPPED | OUTPATIENT
Start: 2020-09-10 | End: 2020-11-09

## 2020-09-16 ENCOUNTER — HOSPITAL ENCOUNTER (OUTPATIENT)
Age: 30
Setting detail: SPECIMEN
Discharge: HOME | End: 2020-09-16
Payer: COMMERCIAL

## 2020-09-16 LAB
THYROXINE, FREE: 1.02 NG/DL (ref 0.93–1.7)
TSH SERPL DL<=0.05 MIU/L-ACNC: 1.69 MIU/L (ref 0.3–5)

## 2020-09-16 PROCEDURE — 82306 VITAMIN D 25 HYDROXY: CPT

## 2020-09-16 PROCEDURE — 82330 ASSAY OF CALCIUM: CPT

## 2020-09-16 PROCEDURE — 84481 FREE ASSAY (FT-3): CPT

## 2020-09-16 PROCEDURE — 36415 COLL VENOUS BLD VENIPUNCTURE: CPT

## 2020-09-16 PROCEDURE — 83970 ASSAY OF PARATHORMONE: CPT

## 2020-09-16 PROCEDURE — 84443 ASSAY THYROID STIM HORMONE: CPT

## 2020-09-16 PROCEDURE — 86800 THYROGLOBULIN ANTIBODY: CPT

## 2020-09-16 PROCEDURE — 84439 ASSAY OF FREE THYROXINE: CPT

## 2020-09-17 LAB
CALCIUM IONIZED: 1.22 MMOL/L (ref 1.13–1.33)
PTH INTACT: 72.63 PG/ML (ref 15–65)
T3 FREE: 2.93 PG/ML (ref 2.02–4.43)
VITAMIN D 25-HYDROXY: 28.3 NG/ML (ref 30–100)

## 2020-09-18 LAB — THYROGLOBULIN AB: <20 IU/ML (ref 0–40)

## 2020-09-28 ENCOUNTER — PATIENT MESSAGE (OUTPATIENT)
Dept: FAMILY MEDICINE CLINIC | Age: 30
End: 2020-09-28

## 2020-09-28 RX ORDER — ERGOCALCIFEROL 1.25 MG/1
50000 CAPSULE ORAL WEEKLY
Qty: 4 CAPSULE | Refills: 2 | Status: SHIPPED | OUTPATIENT
Start: 2020-09-28 | End: 2021-02-09

## 2020-09-28 NOTE — TELEPHONE ENCOUNTER
From: Destinee Parekh  To: Xiomara Leonard APRN - CNP  Sent: 9/28/2020 1:02 PM EDT  Subject: Test Results Question    I have a question about THYROGLOBULIN ANTIBODY resulted on 9/18/20, 12:51 PM.    I will need a refill on the vitamin D please. And I have been taking a daily woman's vitamin and metabolism vitamins. I hope that helped with the thyroid levels.

## 2020-10-21 ENCOUNTER — HOSPITAL ENCOUNTER (OUTPATIENT)
Age: 30
Setting detail: SPECIMEN
Discharge: HOME | End: 2020-10-21
Payer: COMMERCIAL

## 2020-10-21 ENCOUNTER — OFFICE VISIT (OUTPATIENT)
Dept: FAMILY MEDICINE CLINIC | Age: 30
End: 2020-10-21
Payer: COMMERCIAL

## 2020-10-21 VITALS
WEIGHT: 243.9 LBS | OXYGEN SATURATION: 99 % | HEART RATE: 89 BPM | SYSTOLIC BLOOD PRESSURE: 124 MMHG | BODY MASS INDEX: 38.2 KG/M2 | DIASTOLIC BLOOD PRESSURE: 82 MMHG

## 2020-10-21 PROBLEM — R11.15 CYCLICAL VOMITING SYNDROME: Status: RESOLVED | Noted: 2019-05-01 | Resolved: 2020-10-21

## 2020-10-21 PROBLEM — H65.493 CHRONIC MIDDLE EAR EFFUSION, BILATERAL: Status: RESOLVED | Noted: 2018-04-10 | Resolved: 2020-10-21

## 2020-10-21 PROBLEM — R10.9 ABDOMINAL PAIN: Status: RESOLVED | Noted: 2018-06-04 | Resolved: 2020-10-21

## 2020-10-21 PROCEDURE — P3000 SCREEN PAP BY TECH W MD SUPV: HCPCS | Performed by: NURSE PRACTITIONER

## 2020-10-21 PROCEDURE — 87591 N.GONORRHOEAE DNA AMP PROB: CPT

## 2020-10-21 PROCEDURE — G0145 SCR C/V CYTO,THINLAYER,RESCR: HCPCS

## 2020-10-21 PROCEDURE — 87624 HPV HI-RISK TYP POOLED RSLT: CPT

## 2020-10-21 PROCEDURE — 87491 CHLMYD TRACH DNA AMP PROBE: CPT

## 2020-10-21 PROCEDURE — 99395 PREV VISIT EST AGE 18-39: CPT

## 2020-10-21 PROCEDURE — 99395 PREV VISIT EST AGE 18-39: CPT | Performed by: NURSE PRACTITIONER

## 2020-10-21 PROCEDURE — G8484 FLU IMMUNIZE NO ADMIN: HCPCS | Performed by: NURSE PRACTITIONER

## 2020-10-21 RX ORDER — AMOXICILLIN 875 MG/1
875 TABLET, COATED ORAL 2 TIMES DAILY
Qty: 20 TABLET | Refills: 0 | Status: SHIPPED | OUTPATIENT
Start: 2020-10-21 | End: 2020-10-31

## 2020-10-21 RX ORDER — FLUCONAZOLE 150 MG/1
TABLET ORAL
Qty: 2 TABLET | Refills: 0 | Status: SHIPPED | OUTPATIENT
Start: 2020-10-21 | End: 2020-10-24

## 2020-10-21 ASSESSMENT — ENCOUNTER SYMPTOMS
SHORTNESS OF BREATH: 0
WHEEZING: 0
SORE THROAT: 0
RHINORRHEA: 0
SINUS PRESSURE: 0
DIARRHEA: 0
COUGH: 0
BLOOD IN STOOL: 0
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
## 2020-10-22 LAB
HEPATITIS C ANTIBODY: NONREACTIVE
HERPES SIMPLEX VIRUS 1 IGG: 0.15
HERPES SIMPLEX VIRUS 2 IGG: 3.98
HIV AG/AB: NONREACTIVE
TREPONEMA PALLIDUM ANTIBODIES: NONREACTIVE

## 2020-10-26 LAB
CHLAMYDIA BY THIN PREP: NEGATIVE
N. GONORRHOEAE DNA, THIN PREP: NEGATIVE
SPECIMEN DESCRIPTION: NORMAL

## 2020-10-30 LAB — CYTOLOGY REPORT: NORMAL

## 2020-11-02 RX ORDER — METRONIDAZOLE 500 MG/1
500 TABLET ORAL 2 TIMES DAILY
Qty: 14 TABLET | Refills: 0 | Status: SHIPPED | OUTPATIENT
Start: 2020-11-02 | End: 2020-11-09

## 2020-11-04 LAB
HPV SOURCE: NORMAL
HPV, GENOTYPE 16: NOT DETECTED
HPV, GENOTYPE 18: NOT DETECTED
HPV, HIGH RISK OTHER: DETECTED

## 2020-11-09 RX ORDER — TIZANIDINE 4 MG/1
TABLET ORAL
Qty: 60 TABLET | Refills: 0 | Status: SHIPPED | OUTPATIENT
Start: 2020-11-09 | End: 2021-07-09

## 2020-11-09 NOTE — TELEPHONE ENCOUNTER
Susi Jackson is calling to request a refill on the following medication(s):  Requested Prescriptions     Pending Prescriptions Disp Refills    tiZANidine (ZANAFLEX) 4 MG tablet [Pharmacy Med Name: TIZANIDINE HCL 4 MG TABLET] 60 tablet 0     Sig: take 1 tablet by mouth twice a day for muscle spasm       Last Visit Date (If Applicable):  22/66/7018    Next Visit Date:    Visit date not found

## 2020-12-02 LAB
CALCIUM SERPL-MCNC: 9.1 MG/DL (ref 8.4–10.2)
CREAT SERPL-MCNC: 0.6 MG/DL (ref 0.5–1)
GFR CALCULATED: > 60
VITAMIN D 25-HYDROXY: 37.4 NG/ML (ref 30–100)

## 2020-12-16 LAB
17 OH PROGESTERONE: NORMAL
PTH INTACT: 50.41 PG/ML (ref 15–65)
TESTOSTERONE TOTAL: 53 NG/DL (ref 20–70)

## 2021-01-18 DIAGNOSIS — R11.0 NAUSEA: ICD-10-CM

## 2021-01-18 RX ORDER — ONDANSETRON 4 MG/1
4 TABLET, ORALLY DISINTEGRATING ORAL EVERY 8 HOURS PRN
Qty: 20 TABLET | Refills: 0 | Status: SHIPPED | OUTPATIENT
Start: 2021-01-18 | End: 2021-07-09

## 2021-02-08 DIAGNOSIS — E55.9 HYPOVITAMINOSIS D: ICD-10-CM

## 2021-02-09 RX ORDER — ERGOCALCIFEROL 1.25 MG/1
CAPSULE ORAL
Qty: 4 CAPSULE | Refills: 2 | Status: SHIPPED | OUTPATIENT
Start: 2021-02-09 | End: 2021-12-21

## 2021-02-09 NOTE — TELEPHONE ENCOUNTER
Chaitanya Lacey is calling to request a refill on the following medication(s):  Requested Prescriptions     Pending Prescriptions Disp Refills    vitamin D (ERGOCALCIFEROL) 1.25 MG (66358 UT) CAPS capsule [Pharmacy Med Name: VITAMIN D2 1.25MG(50,000 UNIT)] 4 capsule 2     Sig: take 1 capsule by mouth every week       Last Visit Date (If Applicable):  68/75/6321    Next Visit Date:    Visit date not found

## 2021-02-17 LAB
INR BLD: 1.1 RATIO (ref 0.8–1.2)
PROTHROMBIN TIME: 12.1 SEC (ref 9.3–12.5)
T4 FREE: 0.96 NG/DL (ref 0.78–2.19)
TSH SERPL DL<=0.05 MIU/L-ACNC: 3.33 MIU/ML (ref 0.49–4.67)

## 2021-02-22 ENCOUNTER — TELEPHONE (OUTPATIENT)
Dept: FAMILY MEDICINE CLINIC | Age: 31
End: 2021-02-22

## 2021-02-22 DIAGNOSIS — G89.4 CHRONIC PAIN SYNDROME: Primary | ICD-10-CM

## 2021-02-22 DIAGNOSIS — G44.229 CHRONIC TENSION-TYPE HEADACHE, NOT INTRACTABLE: ICD-10-CM

## 2021-02-22 NOTE — TELEPHONE ENCOUNTER
Pt stated she spoke with her insurance and she is covered to get a referral for massage therapy.  Would like a referral to [facility]

## 2021-03-08 ENCOUNTER — OFFICE VISIT (OUTPATIENT)
Dept: OPTOMETRY | Age: 31
End: 2021-03-08
Payer: COMMERCIAL

## 2021-03-08 DIAGNOSIS — H52.203 MYOPIA OF BOTH EYES WITH ASTIGMATISM: ICD-10-CM

## 2021-03-08 DIAGNOSIS — H04.129 DRY EYE: Primary | ICD-10-CM

## 2021-03-08 DIAGNOSIS — H52.13 MYOPIA OF BOTH EYES WITH ASTIGMATISM: ICD-10-CM

## 2021-03-08 DIAGNOSIS — H53.8 BLURRED VISION, BILATERAL: ICD-10-CM

## 2021-03-08 PROCEDURE — 92014 COMPRE OPH EXAM EST PT 1/>: CPT | Performed by: OPTOMETRIST

## 2021-03-08 PROCEDURE — 92015 DETERMINE REFRACTIVE STATE: CPT | Performed by: OPTOMETRIST

## 2021-03-08 ASSESSMENT — TONOMETRY
IOP_METHOD: NON-CONTACT AIR PUFF
OS_IOP_MMHG: 22
OD_IOP_MMHG: 19

## 2021-03-08 ASSESSMENT — SLIT LAMP EXAM - LIDS
COMMENTS: NORMAL
COMMENTS: NORMAL

## 2021-03-08 ASSESSMENT — ENCOUNTER SYMPTOMS
GASTROINTESTINAL NEGATIVE: 0
RESPIRATORY NEGATIVE: 0
EYES NEGATIVE: 0
ALLERGIC/IMMUNOLOGIC NEGATIVE: 0

## 2021-03-08 ASSESSMENT — REFRACTION_MANIFEST
OS_AXIS: 003
OS_CYLINDER: -2.25
OS_SPHERE: -1.25
OD_CYLINDER: -1.25
OD_SPHERE: -2.75
OD_AXIS: 178

## 2021-03-08 ASSESSMENT — REFRACTION_WEARINGRX
SPECS_TYPE: SVL
OD_CYLINDER: -1.25
OD_AXIS: 173
OD_SPHERE: -2.75
OS_AXIS: 001
OS_CYLINDER: -2.25
OS_SPHERE: -1.25

## 2021-03-08 ASSESSMENT — VISUAL ACUITY
METHOD: SNELLEN - LINEAR
OS_CC+: -1
OS_CC: 20/20
CORRECTION_TYPE: GLASSES
OD_CC+: -2

## 2021-03-08 ASSESSMENT — KERATOMETRY
OD_K2POWER_DIOPTERS: 44.25
METHOD_AUTO_MANUAL: AUTOMATED
OS_AXISANGLE_DEGREES: 090
OS_AXISANGLE2_DEGREES: 180
OD_K1POWER_DIOPTERS: 43.00
OD_AXISANGLE_DEGREES: 088
OS_K2POWER_DIOPTERS: 45.75
OD_AXISANGLE2_DEGREES: 178
OS_K1POWER_DIOPTERS: 43.25

## 2021-03-08 NOTE — PATIENT INSTRUCTIONS
New glasses recommended     Recommended gel drops for night time lubrication for the dry eye    Refresh liquigel is an example
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
## 2021-03-17 DIAGNOSIS — K21.9 GASTROESOPHAGEAL REFLUX DISEASE: ICD-10-CM

## 2021-03-17 RX ORDER — OMEPRAZOLE 40 MG/1
CAPSULE, DELAYED RELEASE ORAL
Qty: 60 CAPSULE | Refills: 2 | Status: SHIPPED | OUTPATIENT
Start: 2021-03-17 | End: 2021-07-07 | Stop reason: ALTCHOICE

## 2021-03-17 NOTE — TELEPHONE ENCOUNTER
Lance Santa is calling to request a refill on the following medication(s):  Requested Prescriptions     Pending Prescriptions Disp Refills    omeprazole (PRILOSEC) 40 MG delayed release capsule [Pharmacy Med Name: OMEPRAZOLE DR 40 MG CAPSULE] 60 capsule 2     Sig: take 1 capsule by mouth twice a day       Last Visit Date (If Applicable):  54/46/3811    Next Visit Date:    Visit date not found

## 2021-05-16 ENCOUNTER — HOSPITAL ENCOUNTER (EMERGENCY)
Age: 31
Discharge: HOME | End: 2021-05-16
Attending: EMERGENCY MEDICINE
Payer: COMMERCIAL

## 2021-05-16 VITALS
BODY MASS INDEX: 39.24 KG/M2 | DIASTOLIC BLOOD PRESSURE: 99 MMHG | HEIGHT: 67 IN | TEMPERATURE: 97.4 F | OXYGEN SATURATION: 98 % | WEIGHT: 250 LBS | HEART RATE: 92 BPM | RESPIRATION RATE: 20 BRPM | SYSTOLIC BLOOD PRESSURE: 135 MMHG

## 2021-05-16 DIAGNOSIS — J06.9 UPPER RESPIRATORY TRACT INFECTION, UNSPECIFIED TYPE: Primary | ICD-10-CM

## 2021-05-16 PROCEDURE — 6370000000 HC RX 637 (ALT 250 FOR IP): Performed by: EMERGENCY MEDICINE

## 2021-05-16 PROCEDURE — 99285 EMERGENCY DEPT VISIT HI MDM: CPT

## 2021-05-16 RX ORDER — PREDNISONE 20 MG/1
40 TABLET ORAL ONCE
Status: COMPLETED | OUTPATIENT
Start: 2021-05-16 | End: 2021-05-16

## 2021-05-16 RX ORDER — PREDNISONE 10 MG/1
TABLET ORAL
Qty: 12 TABLET | Refills: 0 | Status: SHIPPED | OUTPATIENT
Start: 2021-05-16 | End: 2021-05-26

## 2021-05-16 RX ADMIN — PREDNISONE 40 MG: 20 TABLET ORAL at 18:29

## 2021-05-16 ASSESSMENT — ENCOUNTER SYMPTOMS
COUGH: 1
SORE THROAT: 1
SHORTNESS OF BREATH: 0

## 2021-05-16 ASSESSMENT — PAIN DESCRIPTION - FREQUENCY: FREQUENCY: CONTINUOUS

## 2021-05-16 ASSESSMENT — PAIN DESCRIPTION - PAIN TYPE: TYPE: ACUTE PAIN

## 2021-05-16 ASSESSMENT — PAIN SCALES - GENERAL: PAINLEVEL_OUTOF10: 5
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
## 2021-05-16 NOTE — LETTER
[facility] ED  [address]  Phone: [phone]               May 16, 2021    Patient: Selina Judge   YOB: 1990   Date of Visit: 5/16/2021       To Whom It May Concern:    Kaushal Carmona was seen and treated in our emergency department on 5/16/2021. Please excuse Abbie Ramos from work on 5/17/21.        Sincerely,       Jayant Elliott RN         Signature:__________________________________

## 2021-05-21 ENCOUNTER — TELEPHONE (OUTPATIENT)
Dept: FAMILY MEDICINE CLINIC | Age: 31
End: 2021-05-21

## 2021-06-28 ENCOUNTER — NURSE TRIAGE (OUTPATIENT)
Dept: OTHER | Facility: CLINIC | Age: 31
End: 2021-06-28

## 2021-06-28 NOTE — TELEPHONE ENCOUNTER
Reason for Disposition   Visible sweat on face or sweat is dripping down    Answer Assessment - Initial Assessment Questions  1. LOCATION: \"Where does it hurt?\"      Epigastric area/chest    2. RADIATION: \"Does the pain shoot anywhere else? \" (e.g., chest, back)      Shooting all over abdomen, chest pain when trying to take a deep breath    3. ONSET: \"When did the pain begin? \" (e.g., minutes, hours or days ago)       Early this morning    4. SUDDEN: \"Gradual or sudden onset? \"      Sudden    5. PATTERN \"Does the pain come and go, or is it constant? \"     - If constant: \"Is it getting better, staying the same, or worsening? \"       (Note: Constant means the pain never goes away completely; most serious pain is constant and it progresses)      - If intermittent: \"How long does it last?\" \"Do you have pain now? \"      (Note: Intermittent means the pain goes away completely between bouts)      Constant    6. SEVERITY: \"How bad is the pain? \"  (e.g., Scale 1-10; mild, moderate, or severe)     - MILD (1-3): doesn't interfere with normal activities, abdomen soft and not tender to touch      - MODERATE (4-7): interferes with normal activities or awakens from sleep, tender to touch      - SEVERE (8-10): excruciating pain, doubled over, unable to do any normal activities        6 per pt (pt moaning/vocal in pain on call)    7. RECURRENT SYMPTOM: \"Have you ever had this type of abdominal pain before? \" If so, ask: \"When was the last time? \" and \"What happened that time? \"       Yes- had a cholestectomy    8. AGGRAVATING FACTORS: \"Does anything seem to cause this pain? \" (e.g., foods, stress, alcohol)      unsure    9. CARDIAC SYMPTOMS: \"Do you have any of the following symptoms: chest pain, difficulty breathing, sweating, nausea? \"     Shortness of breath, nausea/vomiting    10. OTHER SYMPTOMS: \"Do you have any other symptoms? \" (e.g., fever, vomiting, diarrhea)        Very dark diarrhea and dark vomit    11.  PREGNANCY: \"Is there any chance you are pregnant? \" \"When was your last menstrual period? \"        No. Just started period yesterday    Protocols used: ABDOMINAL PAIN - UPPER-ADULT-OH    [facility] [employer] [facility]. Brief description of triage: Pt called with concern of abdominal pain, N/V, dark vomit, very dark runny stools, SOB since this morning. Triage indicates for patient to call  now. Returned call to pt to follow up on 911 disposition. Pt did not call EMS, but is currently being driven by employer to ED. Care advice provided, patient verbalizes understanding; denies any other questions or concerns; instructed to call back for any new or worsening symptoms. Attention Provider: Thank you for allowing me to participate in the care of your patient. The patient was connected to triage in response to information provided to the ECC. Please do not respond through this encounter as the response is not directed to a shared pool.

## 2021-07-07 ENCOUNTER — OFFICE VISIT (OUTPATIENT)
Dept: FAMILY MEDICINE CLINIC | Age: 31
End: 2021-07-07
Payer: COMMERCIAL

## 2021-07-07 VITALS
SYSTOLIC BLOOD PRESSURE: 118 MMHG | BODY MASS INDEX: 41.3 KG/M2 | HEART RATE: 90 BPM | WEIGHT: 263.7 LBS | DIASTOLIC BLOOD PRESSURE: 66 MMHG | OXYGEN SATURATION: 99 %

## 2021-07-07 DIAGNOSIS — K92.1 BLACK STOOLS: Primary | ICD-10-CM

## 2021-07-07 DIAGNOSIS — R53.83 FATIGUE, UNSPECIFIED TYPE: ICD-10-CM

## 2021-07-07 DIAGNOSIS — M25.50 MULTIPLE JOINT PAIN: ICD-10-CM

## 2021-07-07 LAB
BASOPHILS %: 1.18 (ref 0–3)
BASOPHILS ABSOLUTE: 0.1 (ref 0–0.3)
C-REACTIVE PROTEIN: < 0.5 MG/DL (ref 0–1)
EOSINOPHILS %: 4.89 (ref 0–10)
EOSINOPHILS ABSOLUTE: 0.4 (ref 0–1.1)
HCT VFR BLD CALC: 43 % (ref 37–47)
HEMOGLOBIN: 14.7 (ref 12–16)
LYMPHOCYTE %: 36.73 (ref 20–51.1)
LYMPHOCYTES ABSOLUTE: 3.02 (ref 1–5.5)
MCH RBC QN AUTO: 30.4 PG (ref 28.5–32.5)
MCHC RBC AUTO-ENTMCNC: 34.1 G/DL (ref 32–37)
MCV RBC AUTO: 89.2 FL (ref 80–94)
MONOCYTES %: 7.45 (ref 1.7–9.3)
MONOCYTES ABSOLUTE: 0.61 (ref 0.1–1)
NEUTROPHILS %: 49.74 (ref 42.2–75.2)
NEUTROPHILS ABSOLUTE: 4.09 (ref 2–8.1)
PDW BLD-RTO: 11.5 % (ref 8.5–15.5)
PLATELET # BLD: 340.2 THOU/MM3 (ref 130–400)
RBC: 4.83 M/UL (ref 4.2–5.4)
SEDIMENTATION RATE, ERYTHROCYTE: 10 MM/HR (ref 0–30)
T4 FREE: 1.03 NG/DL (ref 0.78–2.19)
TSH SERPL DL<=0.05 MIU/L-ACNC: 1.4 MIU/ML (ref 0.49–4.67)
URIC ACID: 4.2 MG/DL (ref 3.5–8.5)
VITAMIN D 25-HYDROXY: 33.4 NG/ML (ref 30–100)
WBC: 8.2 THOU/ML3 (ref 4.8–10.8)

## 2021-07-07 PROCEDURE — G8417 CALC BMI ABV UP PARAM F/U: HCPCS | Performed by: NURSE PRACTITIONER

## 2021-07-07 PROCEDURE — G8427 DOCREV CUR MEDS BY ELIG CLIN: HCPCS | Performed by: NURSE PRACTITIONER

## 2021-07-07 PROCEDURE — 1036F TOBACCO NON-USER: CPT | Performed by: NURSE PRACTITIONER

## 2021-07-07 PROCEDURE — 99213 OFFICE O/P EST LOW 20 MIN: CPT

## 2021-07-07 PROCEDURE — 99214 OFFICE O/P EST MOD 30 MIN: CPT | Performed by: NURSE PRACTITIONER

## 2021-07-07 RX ORDER — PANTOPRAZOLE SODIUM 40 MG/1
40 TABLET, DELAYED RELEASE ORAL DAILY
COMMUNITY
End: 2021-10-21 | Stop reason: SDUPTHER

## 2021-07-07 SDOH — ECONOMIC STABILITY: FOOD INSECURITY: WITHIN THE PAST 12 MONTHS, THE FOOD YOU BOUGHT JUST DIDN'T LAST AND YOU DIDN'T HAVE MONEY TO GET MORE.: NEVER TRUE

## 2021-07-07 SDOH — ECONOMIC STABILITY: FOOD INSECURITY: WITHIN THE PAST 12 MONTHS, YOU WORRIED THAT YOUR FOOD WOULD RUN OUT BEFORE YOU GOT MONEY TO BUY MORE.: NEVER TRUE

## 2021-07-07 ASSESSMENT — PATIENT HEALTH QUESTIONNAIRE - PHQ9
SUM OF ALL RESPONSES TO PHQ QUESTIONS 1-9: 0
1. LITTLE INTEREST OR PLEASURE IN DOING THINGS: 0
SUM OF ALL RESPONSES TO PHQ QUESTIONS 1-9: 0
SUM OF ALL RESPONSES TO PHQ9 QUESTIONS 1 & 2: 0
2. FEELING DOWN, DEPRESSED OR HOPELESS: 0
SUM OF ALL RESPONSES TO PHQ QUESTIONS 1-9: 0

## 2021-07-07 ASSESSMENT — SOCIAL DETERMINANTS OF HEALTH (SDOH): HOW HARD IS IT FOR YOU TO PAY FOR THE VERY BASICS LIKE FOOD, HOUSING, MEDICAL CARE, AND HEATING?: NOT HARD AT ALL

## 2021-07-07 ASSESSMENT — ENCOUNTER SYMPTOMS
VOMITING: 1
BELCHING: 1
NAUSEA: 1
CONSTIPATION: 1
DIARRHEA: 1
ABDOMINAL PAIN: 1
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
## 2021-07-09 ENCOUNTER — OFFICE VISIT (OUTPATIENT)
Dept: SURGERY | Age: 31
End: 2021-07-09
Payer: COMMERCIAL

## 2021-07-09 VITALS
DIASTOLIC BLOOD PRESSURE: 79 MMHG | BODY MASS INDEX: 41.15 KG/M2 | HEIGHT: 67 IN | TEMPERATURE: 98.4 F | SYSTOLIC BLOOD PRESSURE: 132 MMHG | WEIGHT: 262.2 LBS | HEART RATE: 69 BPM

## 2021-07-09 DIAGNOSIS — R19.8 ALTERNATING CONSTIPATION AND DIARRHEA: ICD-10-CM

## 2021-07-09 DIAGNOSIS — K21.9 GASTROESOPHAGEAL REFLUX DISEASE WITHOUT ESOPHAGITIS: ICD-10-CM

## 2021-07-09 DIAGNOSIS — Z01.818 PRE-OP TESTING: Primary | ICD-10-CM

## 2021-07-09 DIAGNOSIS — K21.9 GASTROESOPHAGEAL REFLUX DISEASE, UNSPECIFIED WHETHER ESOPHAGITIS PRESENT: ICD-10-CM

## 2021-07-09 DIAGNOSIS — R10.13 EPIGASTRIC PAIN: Primary | ICD-10-CM

## 2021-07-09 LAB — CYCLIC CITRULLIN PEPTIDE AB: <0.4 U/ML (ref 0–7)

## 2021-07-09 PROCEDURE — G8427 DOCREV CUR MEDS BY ELIG CLIN: HCPCS | Performed by: SURGERY

## 2021-07-09 PROCEDURE — 99215 OFFICE O/P EST HI 40 MIN: CPT | Performed by: SURGERY

## 2021-07-09 PROCEDURE — G8417 CALC BMI ABV UP PARAM F/U: HCPCS | Performed by: SURGERY

## 2021-07-09 PROCEDURE — 1036F TOBACCO NON-USER: CPT | Performed by: SURGERY

## 2021-07-09 RX ORDER — PROMETHAZINE HYDROCHLORIDE 12.5 MG/1
12.5 TABLET ORAL 4 TIMES DAILY PRN
Qty: 20 TABLET | Refills: 2 | Status: SHIPPED | OUTPATIENT
Start: 2021-07-09 | End: 2022-01-06 | Stop reason: ALTCHOICE

## 2021-07-09 ASSESSMENT — ENCOUNTER SYMPTOMS
CHOKING: 0
SHORTNESS OF BREATH: 0
ABDOMINAL PAIN: 1
HEMATOCHEZIA: 0
SORE THROAT: 1
CONSTIPATION: 1
EYE PAIN: 1
VOICE CHANGE: 0
VOMITING: 1
DIARRHEA: 1
CHEST TIGHTNESS: 0
NAUSEA: 1
WHEEZING: 0
TROUBLE SWALLOWING: 0

## 2021-07-09 ASSESSMENT — CROHNS DISEASE ACTIVITY INDEX (CDAI): CDAI SCORE: 0
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
## 2021-07-09 NOTE — PATIENT INSTRUCTIONS
Patient Education        Gastroesophageal Reflux Disease (GERD): Care Instructions  Overview     Gastroesophageal reflux disease (GERD) is the backward flow of stomach acid into the esophagus. The esophagus is the tube that leads from your throat to your stomach. A one-way valve prevents the stomach acid from backing up into this tube. But when you have GERD, this valve does not close tightly enough. This can also cause pain and swelling in your esophagus. (This is called esophagitis.)  If you have mild GERD symptoms including heartburn, you may be able to control the problem with antacids or over-the-counter medicine. You can also make lifestyle changes to help reduce your symptoms. These include changing your diet and eating habits, such as not eating late at night and losing weight. Follow-up care is a key part of your treatment and safety. Be sure to make and go to all appointments, and call your doctor if you are having problems. It's also a good idea to know your test results and keep a list of the medicines you take. How can you care for yourself at home? · Take your medicines exactly as prescribed. Call your doctor if you think you are having a problem with your medicine. · Your doctor may recommend over-the-counter medicine. For mild or occasional indigestion, antacids, such as Tums, Gaviscon, Mylanta, or Maalox, may help. Your doctor also may recommend over-the-counter acid reducers, such as famotidine (Pepcid AC), cimetidine (Tagamet HB), or omeprazole (Prilosec). Read and follow all instructions on the label. If you use these medicines often, talk with your doctor. · Change your eating habits. ? It's best to eat several small meals instead of two or three large meals. ? After you eat, wait 2 to 3 hours before you lie down. ? Chocolate, mint, and alcohol can make GERD worse. ?  Spicy foods, foods that have a lot of acid (like tomatoes and oranges), and coffee can make GERD symptoms worse in some people. If your symptoms are worse after you eat a certain food, you may want to stop eating that food to see if your symptoms get better. · Do not smoke or chew tobacco. Smoking can make GERD worse. If you need help quitting, talk to your doctor about stop-smoking programs and medicines. These can increase your chances of quitting for good. · If you have GERD symptoms at night, raise the head of your bed 6 to 8 inches by putting the frame on blocks or placing a foam wedge under the head of your mattress. (Adding extra pillows does not work.)  · Do not wear tight clothing around your middle. · Lose weight if you need to. Losing just 5 to 10 pounds can help. When should you call for help? Call your doctor now or seek immediate medical care if:    · You have new or different belly pain.     · Your stools are black and tarlike or have streaks of blood. Watch closely for changes in your health, and be sure to contact your doctor if:    · Your symptoms have not improved after 2 days.     · Food seems to catch in your throat or chest.   Where can you learn more? Go to https://[employer]Bit[employer]. org and sign in to your [employer] account. Enter U884 in the [facility] box to learn more about \"Gastroesophageal Reflux Disease (GERD): Care Instructions. \"     If you do not have an account, please click on the \"Sign Up Now\" link. Current as of: February 10, 2021               Content Version: 12.9  © 2006-2021 [employer]. Care instructions adapted under license by [facility] ([facility]). If you have questions about a medical condition or this instruction, always ask your healthcare professional. [place] [address] any warranty or liability for your use of this information.        Patient Education        Irritable Bowel Syndrome: Care Instructions  Your Care Instructions  Irritable bowel syndrome, or IBS, is a problem with the intestines that causes belly pain, bloating, gas, constipation, and diarrhea. The cause of IBS is not well known. IBS can last for many years, but it does not get worse over time or lead to serious disease. Most people can control their symptoms by changing their diet and reducing stress. Follow-up care is a key part of your treatment and safety. Be sure to make and go to all appointments, and call your doctor if you are having problems. It's also a good idea to know your test results and keep a list of the medicines you take. How can you care for yourself at home? · Prevent diarrhea:  ? Limit the amount of high-fiber foods you eat. This includes vegetables, fruits, whole-grain breads and pasta, high-fiber cereal, and brown rice. ? Limit dairy products. ? Limit artificial sweeteners such as sorbitol and xylitol. · Avoid constipation:  ? Include fruits, vegetables, beans, and whole grains in your diet each day. These foods are high in fiber. ? Drink plenty of fluids. If you have kidney, heart, or liver disease and have to limit fluids, talk with your doctor before you increase the amount of fluids you drink. ? Get some exercise every day. Build up slowly to 30 to 60 minutes a day on 5 or more days of the week. ? Take a fiber supplement, such as Citrucel or Metamucil, every day if needed. Read and follow all instructions on the label. ? Schedule time each day for a bowel movement. Having a daily routine may help. Take your time and do not strain when having a bowel movement. · To help relieve bloating or gas, avoid foods such as beans, cabbage, cauliflower, or broccoli. · Keep a daily diary of what you eat and what symptoms you have. This may help find foods that cause you problems. · Eat slowly. Try to make mealtime relaxing. · Find ways to reduce stress. When should you call for help?    Call your doctor now or seek immediate medical care if:    · Your pain is different than usual or occurs with fever.     · You lose weight without trying, or you

## 2021-07-11 LAB — HLA B27: NEGATIVE

## 2021-07-12 ENCOUNTER — TELEPHONE (OUTPATIENT)
Dept: FAMILY MEDICINE CLINIC | Age: 31
End: 2021-07-12

## 2021-07-12 NOTE — TELEPHONE ENCOUNTER
07/10/2021- Received call from the patient sounded very upset/crying during the whole telephone conversation about the recent physician appointment she had. Offered her anxiety medications and console her and provided the reassurance that I will escalate and bring it to the attention of my boss/.

## 2021-07-25 ASSESSMENT — ENCOUNTER SYMPTOMS
SHORTNESS OF BREATH: 0
COUGH: 0
SORE THROAT: 1
WHEEZING: 0
EYES NEGATIVE: 1

## 2021-08-11 ENCOUNTER — TELEPHONE (OUTPATIENT)
Dept: FAMILY MEDICINE CLINIC | Age: 31
End: 2021-08-11

## 2021-08-11 DIAGNOSIS — K92.1 BLACK STOOLS: Primary | ICD-10-CM

## 2021-08-11 DIAGNOSIS — K21.9 GASTROESOPHAGEAL REFLUX DISEASE, UNSPECIFIED WHETHER ESOPHAGITIS PRESENT: ICD-10-CM

## 2021-08-11 NOTE — TELEPHONE ENCOUNTER
Pt called and stated she previously asked for a new GI or Gen surg doctor to be referred to. I did not see a referral for this. She said she had a bad experience with Dr Leonel Ray and will not go back to him. She needs a colonoscopy done.

## 2021-08-18 ENCOUNTER — TELEPHONE (OUTPATIENT)
Dept: FAMILY MEDICINE CLINIC | Age: 31
End: 2021-08-18

## 2021-08-18 NOTE — TELEPHONE ENCOUNTER
Patient called states the GI specialist called her to let her know that her PCP needs to redo the referral to   Specify the procedure to have done and to fax it to Dr. Zackary Clements. Otherwise she can only be scheduled for an office visit.

## 2021-10-14 ENCOUNTER — TELEPHONE (OUTPATIENT)
Dept: FAMILY MEDICINE CLINIC | Age: 31
End: 2021-10-14

## 2021-10-14 DIAGNOSIS — Z98.890 POST-OPERATIVE NAUSEA AND VOMITING: Primary | ICD-10-CM

## 2021-10-14 DIAGNOSIS — R11.2 POST-OPERATIVE NAUSEA AND VOMITING: Primary | ICD-10-CM

## 2021-10-14 RX ORDER — SCOLOPAMINE TRANSDERMAL SYSTEM 1 MG/1
1 PATCH, EXTENDED RELEASE TRANSDERMAL
Qty: 1 PATCH | Refills: 0 | Status: SHIPPED | OUTPATIENT
Start: 2021-10-14 | End: 2022-01-06 | Stop reason: ALTCHOICE

## 2021-10-14 NOTE — TELEPHONE ENCOUNTER
1 patches sent in to allow the patient to have some relief of the week and hopefully at that time her nausea will have improved from her postoperative state. If the nausea is not improving then recommend in person evaluation next week.

## 2021-10-14 NOTE — TELEPHONE ENCOUNTER
----- Message from Magda Cuevas sent at 10/14/2021 12:32 PM EDT -----  Subject: Message to Provider    QUESTIONS  Information for Provider? Patient received a nausea patch in the ER. The   patient would like a prescription for a nausea patch. She doesn't know   what brand, but patient puts it behind her ear. Thank you.   ---------------------------------------------------------------------------  --------------  CALL BACK INFO  What is the best way for the office to contact you? OK to leave message on   voicemail  Preferred Call Back Phone Number? [phone]  ---------------------------------------------------------------------------  --------------  SCRIPT ANSWERS  Relationship to Patient?  Self

## 2021-10-18 ENCOUNTER — TELEPHONE (OUTPATIENT)
Dept: FAMILY MEDICINE CLINIC | Age: 31
End: 2021-10-18

## 2021-10-18 ENCOUNTER — OFFICE VISIT (OUTPATIENT)
Dept: FAMILY MEDICINE CLINIC | Age: 31
End: 2021-10-18
Payer: COMMERCIAL

## 2021-10-18 VITALS
WEIGHT: 260.4 LBS | SYSTOLIC BLOOD PRESSURE: 138 MMHG | BODY MASS INDEX: 40.87 KG/M2 | HEIGHT: 67 IN | RESPIRATION RATE: 20 BRPM | OXYGEN SATURATION: 98 % | TEMPERATURE: 97.3 F | DIASTOLIC BLOOD PRESSURE: 84 MMHG | HEART RATE: 80 BPM

## 2021-10-18 DIAGNOSIS — R50.9 CHILLS WITH FEVER: ICD-10-CM

## 2021-10-18 DIAGNOSIS — Z98.890 HX OF NASAL SEPTOPLASTY: ICD-10-CM

## 2021-10-18 DIAGNOSIS — J34.89 SINUS PAIN: Primary | ICD-10-CM

## 2021-10-18 DIAGNOSIS — E55.9 HYPOVITAMINOSIS D: ICD-10-CM

## 2021-10-18 PROCEDURE — 1036F TOBACCO NON-USER: CPT | Performed by: NURSE PRACTITIONER

## 2021-10-18 PROCEDURE — G8427 DOCREV CUR MEDS BY ELIG CLIN: HCPCS | Performed by: NURSE PRACTITIONER

## 2021-10-18 PROCEDURE — G8484 FLU IMMUNIZE NO ADMIN: HCPCS | Performed by: NURSE PRACTITIONER

## 2021-10-18 PROCEDURE — 99212 OFFICE O/P EST SF 10 MIN: CPT | Performed by: NURSE PRACTITIONER

## 2021-10-18 PROCEDURE — G8417 CALC BMI ABV UP PARAM F/U: HCPCS | Performed by: NURSE PRACTITIONER

## 2021-10-18 PROCEDURE — 99213 OFFICE O/P EST LOW 20 MIN: CPT | Performed by: NURSE PRACTITIONER

## 2021-10-18 RX ORDER — TRANEXAMIC ACID 650 1/1
TABLET ORAL
COMMUNITY
Start: 2021-10-12 | End: 2022-01-06 | Stop reason: ALTCHOICE

## 2021-10-18 RX ORDER — SODIUM CHLORIDE 0.65 %
AEROSOL, SPRAY (ML) NASAL
COMMUNITY
Start: 2021-10-12

## 2021-10-18 RX ORDER — NALOXONE HYDROCHLORIDE 4 MG/.1ML
4 SPRAY NASAL PRN
COMMUNITY
Start: 2021-10-14 | End: 2022-04-18

## 2021-10-18 RX ORDER — CEFPROZIL 500 MG/1
TABLET, FILM COATED ORAL
COMMUNITY
Start: 2021-10-12 | End: 2022-01-06 | Stop reason: ALTCHOICE

## 2021-10-18 RX ORDER — DOXYCYCLINE HYCLATE 100 MG
100 TABLET ORAL 2 TIMES DAILY
Qty: 14 TABLET | Refills: 0 | Status: SHIPPED | OUTPATIENT
Start: 2021-10-18 | End: 2021-10-25

## 2021-10-18 RX ORDER — HYDROCODONE BITARTRATE AND ACETAMINOPHEN 5; 325 MG/1; MG/1
TABLET ORAL
COMMUNITY
Start: 2021-10-12 | End: 2022-01-06 | Stop reason: ALTCHOICE

## 2021-10-18 RX ORDER — PREDNISONE 10 MG/1
TABLET ORAL
COMMUNITY
Start: 2021-10-12 | End: 2022-01-06 | Stop reason: ALTCHOICE

## 2021-10-18 RX ORDER — SUCRALFATE 1 G/1
TABLET ORAL
COMMUNITY
Start: 2021-10-01 | End: 2021-10-21 | Stop reason: SDUPTHER

## 2021-10-18 RX ORDER — SOD SULF/POT CHLORIDE/MAG SULF 1.479 G
TABLET ORAL
COMMUNITY
Start: 2021-10-05 | End: 2022-01-06 | Stop reason: ALTCHOICE

## 2021-10-18 ASSESSMENT — PATIENT HEALTH QUESTIONNAIRE - PHQ9
8. MOVING OR SPEAKING SO SLOWLY THAT OTHER PEOPLE COULD HAVE NOTICED. OR THE OPPOSITE, BEING SO FIGETY OR RESTLESS THAT YOU HAVE BEEN MOVING AROUND A LOT MORE THAN USUAL: 0
SUM OF ALL RESPONSES TO PHQ QUESTIONS 1-9: 19
SUM OF ALL RESPONSES TO PHQ QUESTIONS 1-9: 22
SUM OF ALL RESPONSES TO PHQ QUESTIONS 1-9: 22
10. IF YOU CHECKED OFF ANY PROBLEMS, HOW DIFFICULT HAVE THESE PROBLEMS MADE IT FOR YOU TO DO YOUR WORK, TAKE CARE OF THINGS AT HOME, OR GET ALONG WITH OTHER PEOPLE: 3
SUM OF ALL RESPONSES TO PHQ9 QUESTIONS 1 & 2: 6
1. LITTLE INTEREST OR PLEASURE IN DOING THINGS: 3
2. FEELING DOWN, DEPRESSED OR HOPELESS: 3
3. TROUBLE FALLING OR STAYING ASLEEP: 3
5. POOR APPETITE OR OVEREATING: 3
6. FEELING BAD ABOUT YOURSELF - OR THAT YOU ARE A FAILURE OR HAVE LET YOURSELF OR YOUR FAMILY DOWN: 3
4. FEELING TIRED OR HAVING LITTLE ENERGY: 3
9. THOUGHTS THAT YOU WOULD BE BETTER OFF DEAD, OR OF HURTING YOURSELF: 3
7. TROUBLE CONCENTRATING ON THINGS, SUCH AS READING THE NEWSPAPER OR WATCHING TELEVISION: 1

## 2021-10-18 ASSESSMENT — ENCOUNTER SYMPTOMS
SINUS PRESSURE: 1
SORE THROAT: 0
SHORTNESS OF BREATH: 0
RHINORRHEA: 1
APNEA: 0
WHEEZING: 0
FACIAL SWELLING: 0
ABDOMINAL PAIN: 0
COUGH: 0
SINUS PAIN: 1
TROUBLE SWALLOWING: 0

## 2021-10-18 ASSESSMENT — COLUMBIA-SUICIDE SEVERITY RATING SCALE - C-SSRS
7. DID THIS OCCUR IN THE LAST THREE MONTHS: NO
4. HAVE YOU HAD THESE THOUGHTS AND HAD SOME INTENTION OF ACTING ON THEM?: YES
6. HAVE YOU EVER DONE ANYTHING, STARTED TO DO ANYTHING, OR PREPARED TO DO ANYTHING TO END YOUR LIFE?: YES
2. HAVE YOU ACTUALLY HAD ANY THOUGHTS OF KILLING YOURSELF?: YES
1. WITHIN THE PAST MONTH, HAVE YOU WISHED YOU WERE DEAD OR WISHED YOU COULD GO TO SLEEP AND NOT WAKE UP?: YES
3. HAVE YOU BEEN THINKING ABOUT HOW YOU MIGHT KILL YOURSELF?: YES

## 2021-10-18 NOTE — TELEPHONE ENCOUNTER
PT called and did the sinus rinse and she did the rinse on one side and most of it came out of her mouth and now her ear is plugged up. PT not sure what to do pressure is bad?

## 2021-10-18 NOTE — TELEPHONE ENCOUNTER
Advise her to start the new antibiotics as prescribed and try to get rest, if no better in 4-6 hours to ER
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
## 2021-10-19 ENCOUNTER — TELEPHONE (OUTPATIENT)
Dept: FAMILY MEDICINE CLINIC | Age: 31
End: 2021-10-19

## 2021-10-19 NOTE — TELEPHONE ENCOUNTER
Chrissy Tamayo is calling to request a refill on the following medication(s):  Requested Prescriptions     Pending Prescriptions Disp Refills    vitamin D (ERGOCALCIFEROL) 1.25 MG (82404 UT) CAPS capsule [Pharmacy Med Name: VITAMIN D2 1.25MG(50,000 UNIT)] 4 capsule 2     Sig: take 1 capsule by mouth every week       Last Visit Date (If Applicable):  2/4/4551    Next Visit Date:    10/19/2021

## 2021-10-20 ENCOUNTER — NURSE TRIAGE (OUTPATIENT)
Dept: OTHER | Facility: CLINIC | Age: 31
End: 2021-10-20

## 2021-10-20 RX ORDER — ERGOCALCIFEROL 1.25 MG/1
CAPSULE ORAL
Qty: 4 CAPSULE | Refills: 2 | OUTPATIENT
Start: 2021-10-20

## 2021-10-21 ENCOUNTER — OFFICE VISIT (OUTPATIENT)
Dept: FAMILY MEDICINE CLINIC | Age: 31
End: 2021-10-21
Payer: COMMERCIAL

## 2021-10-21 VITALS
BODY MASS INDEX: 41.55 KG/M2 | SYSTOLIC BLOOD PRESSURE: 134 MMHG | HEART RATE: 82 BPM | WEIGHT: 262.1 LBS | DIASTOLIC BLOOD PRESSURE: 84 MMHG | OXYGEN SATURATION: 98 %

## 2021-10-21 DIAGNOSIS — K21.9 GASTROESOPHAGEAL REFLUX DISEASE, UNSPECIFIED WHETHER ESOPHAGITIS PRESENT: ICD-10-CM

## 2021-10-21 DIAGNOSIS — B37.9 YEAST INFECTION: Primary | ICD-10-CM

## 2021-10-21 PROCEDURE — G8427 DOCREV CUR MEDS BY ELIG CLIN: HCPCS | Performed by: NURSE PRACTITIONER

## 2021-10-21 PROCEDURE — 99212 OFFICE O/P EST SF 10 MIN: CPT

## 2021-10-21 PROCEDURE — 99213 OFFICE O/P EST LOW 20 MIN: CPT | Performed by: NURSE PRACTITIONER

## 2021-10-21 PROCEDURE — G8417 CALC BMI ABV UP PARAM F/U: HCPCS | Performed by: NURSE PRACTITIONER

## 2021-10-21 PROCEDURE — G8484 FLU IMMUNIZE NO ADMIN: HCPCS | Performed by: NURSE PRACTITIONER

## 2021-10-21 PROCEDURE — 1036F TOBACCO NON-USER: CPT | Performed by: NURSE PRACTITIONER

## 2021-10-21 RX ORDER — PANTOPRAZOLE SODIUM 40 MG/1
40 TABLET, DELAYED RELEASE ORAL 2 TIMES DAILY
Qty: 60 TABLET | Refills: 2 | Status: SHIPPED | OUTPATIENT
Start: 2021-10-21 | End: 2022-04-19

## 2021-10-21 RX ORDER — FLUCONAZOLE 150 MG/1
150 TABLET ORAL
Qty: 2 TABLET | Refills: 0 | Status: SHIPPED | OUTPATIENT
Start: 2021-10-21 | End: 2021-10-27

## 2021-10-21 RX ORDER — SUCRALFATE 1 G/1
TABLET ORAL
Qty: 120 TABLET | Refills: 0 | Status: SHIPPED | OUTPATIENT
Start: 2021-10-21 | End: 2022-04-18
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
## 2021-10-30 ASSESSMENT — ENCOUNTER SYMPTOMS
NAUSEA: 1
SHORTNESS OF BREATH: 0
WHEEZING: 0
DIARRHEA: 1
ABDOMINAL PAIN: 1

## 2021-12-20 ENCOUNTER — TELEPHONE (OUTPATIENT)
Dept: FAMILY MEDICINE CLINIC | Age: 31
End: 2021-12-20

## 2021-12-20 DIAGNOSIS — R11.0 NAUSEA: Primary | ICD-10-CM

## 2021-12-20 DIAGNOSIS — R10.84 STOMACH CRAMPS, GENERALIZED: ICD-10-CM

## 2021-12-20 RX ORDER — DICYCLOMINE HYDROCHLORIDE 10 MG/1
10 CAPSULE ORAL 4 TIMES DAILY
Qty: 120 CAPSULE | Refills: 0 | Status: SHIPPED | OUTPATIENT
Start: 2021-12-20 | End: 2022-04-18 | Stop reason: DRUGHIGH

## 2021-12-20 NOTE — TELEPHONE ENCOUNTER
Was inquiring about a referral to gastroenterology. Marah Ochoa it was discussed at last visit. Sx getting worse. Having a lot of nausea, and feels like intestines are twisted or knotted. Also a lot of vomiting. Using Zofran, but not helping. Has used Bentyl before, but is out. Could she get that? Uses [facility].

## 2021-12-20 NOTE — TELEPHONE ENCOUNTER
Patient was previously referred to Dr. Delvin Pringle (gastroenterologist) on 8/12/2021. It looks like she was evaluated by general surgery (Dr. Guillermo Henderson) on 10/5/2021 and EGD/colonoscopy were recommended. I do not see any follow-up indicating these procedures were completed. Will send short supply of dicyclomine to pharmacy as requested.   Patient needs to follow-up with specialist.

## 2021-12-21 DIAGNOSIS — R11.0 NAUSEA: ICD-10-CM

## 2021-12-21 DIAGNOSIS — R10.84 STOMACH CRAMPS, GENERALIZED: ICD-10-CM

## 2021-12-21 DIAGNOSIS — K92.1 BLACK STOOLS: ICD-10-CM

## 2021-12-21 DIAGNOSIS — K21.9 GASTROESOPHAGEAL REFLUX DISEASE, UNSPECIFIED WHETHER ESOPHAGITIS PRESENT: Primary | ICD-10-CM

## 2021-12-21 NOTE — TELEPHONE ENCOUNTER
Called patient. Told her Tanmay put in a referral to Dr Ashish Tiwari and info is being faxed today. Gave patient phone number of [phone] to reach out to their office and schedule. Told her that a referral was put in in August to Dr Ashish Tiwari and there office left a message for pt to call back and never heard back.

## 2022-01-06 ENCOUNTER — OFFICE VISIT (OUTPATIENT)
Dept: FAMILY MEDICINE CLINIC | Age: 32
End: 2022-01-06
Payer: COMMERCIAL

## 2022-01-06 VITALS
SYSTOLIC BLOOD PRESSURE: 134 MMHG | DIASTOLIC BLOOD PRESSURE: 86 MMHG | WEIGHT: 274.4 LBS | OXYGEN SATURATION: 97 % | BODY MASS INDEX: 43.49 KG/M2 | HEART RATE: 105 BPM

## 2022-01-06 DIAGNOSIS — M25.562 BILATERAL CHRONIC KNEE PAIN: ICD-10-CM

## 2022-01-06 DIAGNOSIS — M25.50 MULTIPLE JOINT PAIN: ICD-10-CM

## 2022-01-06 DIAGNOSIS — G89.29 BILATERAL CHRONIC KNEE PAIN: ICD-10-CM

## 2022-01-06 DIAGNOSIS — M25.561 BILATERAL CHRONIC KNEE PAIN: ICD-10-CM

## 2022-01-06 DIAGNOSIS — M79.7 FIBROMYALGIA: ICD-10-CM

## 2022-01-06 DIAGNOSIS — M54.41 CHRONIC BILATERAL LOW BACK PAIN WITH RIGHT-SIDED SCIATICA: ICD-10-CM

## 2022-01-06 DIAGNOSIS — G89.4 CHRONIC PAIN SYNDROME: Primary | ICD-10-CM

## 2022-01-06 DIAGNOSIS — G89.29 CHRONIC BILATERAL LOW BACK PAIN WITH RIGHT-SIDED SCIATICA: ICD-10-CM

## 2022-01-06 DIAGNOSIS — K30 FUNCTIONAL DYSPEPSIA: ICD-10-CM

## 2022-01-06 PROCEDURE — G8417 CALC BMI ABV UP PARAM F/U: HCPCS | Performed by: NURSE PRACTITIONER

## 2022-01-06 PROCEDURE — 1036F TOBACCO NON-USER: CPT | Performed by: NURSE PRACTITIONER

## 2022-01-06 PROCEDURE — 99213 OFFICE O/P EST LOW 20 MIN: CPT

## 2022-01-06 PROCEDURE — 99214 OFFICE O/P EST MOD 30 MIN: CPT | Performed by: NURSE PRACTITIONER

## 2022-01-06 PROCEDURE — G8484 FLU IMMUNIZE NO ADMIN: HCPCS | Performed by: NURSE PRACTITIONER

## 2022-01-06 PROCEDURE — G8427 DOCREV CUR MEDS BY ELIG CLIN: HCPCS | Performed by: NURSE PRACTITIONER

## 2022-01-06 ASSESSMENT — ENCOUNTER SYMPTOMS
VOMITING: 1
BACK PAIN: 1
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
## 2022-01-12 LAB
BASOPHILS %: 1.06 (ref 0–3)
BASOPHILS ABSOLUTE: 0.07 (ref 0–0.3)
C-REACTIVE PROTEIN: < 0.5 MG/DL (ref 0–1)
EOSINOPHILS %: 4.07 (ref 0–10)
EOSINOPHILS ABSOLUTE: 0.25 (ref 0–1.1)
HCT VFR BLD CALC: 43.1 % (ref 37–47)
HEMOGLOBIN: 14.4 (ref 12–16)
LYMPHOCYTE %: 43.68 (ref 20–51.1)
LYMPHOCYTES ABSOLUTE: 2.71 (ref 1–5.5)
MCH RBC QN AUTO: 29.5 PG (ref 28.5–32.5)
MCHC RBC AUTO-ENTMCNC: 33.5 G/DL (ref 32–37)
MCV RBC AUTO: 88.2 FL (ref 80–94)
MONOCYTES %: 5.96 (ref 1.7–9.3)
MONOCYTES ABSOLUTE: 0.37 (ref 0.1–1)
NEUTROPHILS %: 45.23 (ref 42.2–75.2)
NEUTROPHILS ABSOLUTE: 2.8 (ref 2–8.1)
PDW BLD-RTO: 10.9 % (ref 8.5–15.5)
PLATELET # BLD: 360.4 THOU/MM3 (ref 130–400)
RBC: 4.88 M/UL (ref 4.2–5.4)
SEDIMENTATION RATE, ERYTHROCYTE: 11 MM/HR (ref 0–30)
WBC: 6.2 THOU/ML3 (ref 4.8–10.8)

## 2022-01-16 PROBLEM — G89.29 CHRONIC BILATERAL LOW BACK PAIN WITH RIGHT-SIDED SCIATICA: Status: ACTIVE | Noted: 2022-01-16

## 2022-01-16 PROBLEM — R11.2 NON-INTRACTABLE VOMITING WITH NAUSEA: Status: RESOLVED | Noted: 2018-06-04 | Resolved: 2022-01-16

## 2022-01-16 PROBLEM — M25.561 BILATERAL CHRONIC KNEE PAIN: Status: ACTIVE | Noted: 2022-01-16

## 2022-01-16 PROBLEM — M54.41 CHRONIC BILATERAL LOW BACK PAIN WITH RIGHT-SIDED SCIATICA: Status: ACTIVE | Noted: 2022-01-16

## 2022-01-16 PROBLEM — G44.229 CHRONIC TENSION-TYPE HEADACHE, NOT INTRACTABLE: Status: RESOLVED | Noted: 2018-09-21 | Resolved: 2022-01-16

## 2022-01-16 PROBLEM — J34.89 SINUS PAIN: Status: RESOLVED | Noted: 2021-10-18 | Resolved: 2022-01-16

## 2022-01-16 PROBLEM — R50.9 CHILLS WITH FEVER: Status: RESOLVED | Noted: 2021-10-18 | Resolved: 2022-01-16

## 2022-01-16 PROBLEM — E66.09 OBESITY DUE TO EXCESS CALORIES: Status: RESOLVED | Noted: 2017-04-24 | Resolved: 2022-01-16

## 2022-01-16 PROBLEM — M25.50 MULTIPLE JOINT PAIN: Status: ACTIVE | Noted: 2022-01-16

## 2022-01-16 PROBLEM — Z98.890 HX OF NASAL SEPTOPLASTY: Status: RESOLVED | Noted: 2021-10-18 | Resolved: 2022-01-16

## 2022-01-16 PROBLEM — G89.29 BILATERAL CHRONIC KNEE PAIN: Status: ACTIVE | Noted: 2022-01-16

## 2022-01-16 PROBLEM — N93.8 DYSFUNCTIONAL UTERINE BLEEDING: Status: RESOLVED | Noted: 2017-07-18 | Resolved: 2022-01-16

## 2022-01-16 PROBLEM — N80.9 ENDOMETRIOSIS: Status: RESOLVED | Noted: 2017-04-24 | Resolved: 2022-01-16

## 2022-01-16 PROBLEM — M25.562 BILATERAL CHRONIC KNEE PAIN: Status: ACTIVE | Noted: 2022-01-16

## 2022-01-16 ASSESSMENT — ENCOUNTER SYMPTOMS
COUGH: 0
BOWEL INCONTINENCE: 0
NAUSEA: 1
WHEEZING: 0
ABDOMINAL PAIN: 1
SHORTNESS OF BREATH: 0

## 2022-01-17 DIAGNOSIS — M25.50 MULTIPLE JOINT PAIN: ICD-10-CM

## 2022-01-17 DIAGNOSIS — M79.7 FIBROMYALGIA: ICD-10-CM

## 2022-01-17 DIAGNOSIS — G89.4 CHRONIC PAIN SYNDROME: ICD-10-CM

## 2022-01-21 NOTE — RESULT ENCOUNTER NOTE
Your recent lab results are normal. Please call the office or message through [phone] with any additional questions or concerns.

## 2022-03-25 DIAGNOSIS — E55.9 HYPOVITAMINOSIS D: ICD-10-CM

## 2022-03-25 RX ORDER — ERGOCALCIFEROL 1.25 MG/1
CAPSULE ORAL
Qty: 12 CAPSULE | Refills: 0 | Status: SHIPPED | OUTPATIENT
Start: 2022-03-25 | End: 2022-07-19 | Stop reason: ALTCHOICE

## 2022-03-25 NOTE — TELEPHONE ENCOUNTER
Lavon Butts is calling to request a refill on the following medication(s):  Requested Prescriptions     Pending Prescriptions Disp Refills    vitamin D (ERGOCALCIFEROL) 1.25 MG (18792 UT) CAPS capsule [Pharmacy Med Name: VITAMIN D2 1.25MG(50,000 UNIT)] 4 capsule 0     Sig: take 1 capsule by mouth every week       Last Visit Date (If Applicable):  4/5/4147    Next Visit Date:    Visit date not found

## 2022-04-17 DIAGNOSIS — K21.9 GASTROESOPHAGEAL REFLUX DISEASE, UNSPECIFIED WHETHER ESOPHAGITIS PRESENT: ICD-10-CM

## 2022-04-18 ENCOUNTER — OFFICE VISIT (OUTPATIENT)
Dept: FAMILY MEDICINE CLINIC | Age: 32
End: 2022-04-18
Payer: COMMERCIAL

## 2022-04-18 VITALS
WEIGHT: 274.8 LBS | BODY MASS INDEX: 43.56 KG/M2 | DIASTOLIC BLOOD PRESSURE: 90 MMHG | HEART RATE: 111 BPM | SYSTOLIC BLOOD PRESSURE: 110 MMHG | OXYGEN SATURATION: 99 %

## 2022-04-18 DIAGNOSIS — R10.84 STOMACH CRAMPS, GENERALIZED: ICD-10-CM

## 2022-04-18 DIAGNOSIS — R10.84 GENERALIZED ABDOMINAL PAIN: Primary | ICD-10-CM

## 2022-04-18 DIAGNOSIS — M79.7 FIBROMYALGIA: ICD-10-CM

## 2022-04-18 DIAGNOSIS — G89.4 CHRONIC PAIN SYNDROME: ICD-10-CM

## 2022-04-18 DIAGNOSIS — R53.83 FATIGUE, UNSPECIFIED TYPE: ICD-10-CM

## 2022-04-18 DIAGNOSIS — K21.9 GASTROESOPHAGEAL REFLUX DISEASE, UNSPECIFIED WHETHER ESOPHAGITIS PRESENT: ICD-10-CM

## 2022-04-18 DIAGNOSIS — N80.9 ENDOMETRIOSIS: ICD-10-CM

## 2022-04-18 DIAGNOSIS — R11.0 NAUSEA: ICD-10-CM

## 2022-04-18 PROCEDURE — 99213 OFFICE O/P EST LOW 20 MIN: CPT | Performed by: NURSE PRACTITIONER

## 2022-04-18 PROCEDURE — 99212 OFFICE O/P EST SF 10 MIN: CPT

## 2022-04-18 RX ORDER — DICYCLOMINE HCL 20 MG
TABLET ORAL AS NEEDED
COMMUNITY
Start: 2022-01-28

## 2022-04-18 RX ORDER — PROMETHAZINE HYDROCHLORIDE 12.5 MG/1
12.5 TABLET ORAL 4 TIMES DAILY PRN
Qty: 30 TABLET | Refills: 2 | Status: SHIPPED | OUTPATIENT
Start: 2022-04-18

## 2022-04-18 RX ORDER — NORTRIPTYLINE HYDROCHLORIDE 10 MG/1
CAPSULE ORAL
COMMUNITY
Start: 2022-04-08 | End: 2022-06-21

## 2022-04-18 RX ORDER — MOMETASONE FUROATE 1 MG/G
OINTMENT TOPICAL
COMMUNITY
Start: 2022-03-23

## 2022-04-18 ASSESSMENT — ENCOUNTER SYMPTOMS
NAUSEA: 1
ABDOMINAL PAIN: 1
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
## 2022-04-18 NOTE — TELEPHONE ENCOUNTER
Linda Poe is requesting a refill on the following medication(s):  Requested Prescriptions     Pending Prescriptions Disp Refills    pantoprazole (PROTONIX) 40 MG tablet [Pharmacy Med Name: PANTOPRAZOLE SOD DR 40 MG TAB] 60 tablet 2     Sig: take 1 tablet by mouth twice a day       Last Visit Date (If Applicable):  8/3/6888    Next Visit Date:    4/18/2022

## 2022-04-19 RX ORDER — PANTOPRAZOLE SODIUM 40 MG/1
TABLET, DELAYED RELEASE ORAL
Qty: 60 TABLET | Refills: 5 | Status: SHIPPED | OUTPATIENT
Start: 2022-04-19

## 2022-04-22 LAB
ALBUMIN/GLOBULIN RATIO: 1.6 G/DL
ALBUMIN: 4.4 G/DL (ref 3.5–5)
ALP BLD-CCNC: 73 UNITS/L (ref 38–126)
ALT SERPL-CCNC: 17 UNITS/L (ref 4–35)
ANION GAP SERPL CALCULATED.3IONS-SCNC: 7.7 MMOL/L
AST SERPL-CCNC: 19 UNITS/L (ref 14–36)
BACTERIA, URINE: NORMAL
BILIRUB SERPL-MCNC: 0.6 MG/DL (ref 0.2–1.3)
BILIRUBIN URINE: NEGATIVE
BLOOD, URINE: NEGATIVE
BUN BLDV-MCNC: 11 MG/DL (ref 7–17)
CALCIUM SERPL-MCNC: 9.2 MG/DL (ref 8.4–10.2)
CASTS UA: NORMAL
CHLORIDE BLD-SCNC: 107 MMOL/L (ref 98–120)
CLARITY: CLEAR
CO2: 24 MMOL/L (ref 22–31)
COLOR, URINE: YELLOW
CREAT SERPL-MCNC: 0.7 MG/DL (ref 0.5–1)
CREATINE KINASE: 53 UNITS/L (ref 25–170)
CRYSTALS, UA: NORMAL
GFR CALCULATED: > 60
GLOBULIN: 2.7 G/DL
GLUCOSE URINE: NEGATIVE MG/DL
GLUCOSE: 97 MG/DL (ref 65–105)
KETONES, URINE: NEGATIVE MG/DL
LEUKOCYTE ESTERASE, URINE: NEGATIVE
MUCUS, URINE: NORMAL
NITRITE, URINE: NEGATIVE
PH UA: 6 (ref 5–8.5)
POTASSIUM SERPL-SCNC: 4.2 MMOL/L (ref 3.6–5)
PROTEIN UA: NEGATIVE MG/DL
RBC URINE: NORMAL (ref 0–2)
SODIUM BLD-SCNC: 139 MMOL/L (ref 135–145)
SPECIFIC GRAVITY, URINE: 1.02 MG/DL (ref 1–1.03)
SQUAMOUS EPITHELIAL: NORMAL
TOTAL PROTEIN, SERUM: 7 G/DL (ref 6.3–8.2)
TRICHOMONAS, URINE: NORMAL
UROBILINOGEN, URINE: 0.2 MG/DL (ref 0.2–1)
WBC URINE: NORMAL (ref 0–4)
YEAST, URINE: NORMAL

## 2022-04-24 PROBLEM — K30 FUNCTIONAL DYSPEPSIA: Status: RESOLVED | Noted: 2019-05-01 | Resolved: 2022-04-24

## 2022-04-24 PROBLEM — R53.83 FATIGUE: Status: ACTIVE | Noted: 2022-04-24

## 2022-04-24 PROBLEM — R10.84 GENERALIZED ABDOMINAL PAIN: Status: ACTIVE | Noted: 2018-06-04

## 2022-04-24 PROBLEM — K21.9 GASTROESOPHAGEAL REFLUX DISEASE: Status: ACTIVE | Noted: 2022-04-24

## 2022-04-24 ASSESSMENT — ENCOUNTER SYMPTOMS
WHEEZING: 0
BLOOD IN STOOL: 1
CONSTIPATION: 1
RECTAL PAIN: 0
COUGH: 0
DIARRHEA: 1
SHORTNESS OF BREATH: 0

## 2022-06-21 ENCOUNTER — OFFICE VISIT (OUTPATIENT)
Dept: FAMILY MEDICINE CLINIC | Age: 32
End: 2022-06-21
Payer: COMMERCIAL

## 2022-06-21 VITALS
OXYGEN SATURATION: 99 % | WEIGHT: 269 LBS | DIASTOLIC BLOOD PRESSURE: 94 MMHG | BODY MASS INDEX: 42.64 KG/M2 | SYSTOLIC BLOOD PRESSURE: 120 MMHG | HEART RATE: 74 BPM

## 2022-06-21 DIAGNOSIS — F41.8 ANXIETY ABOUT HEALTH: ICD-10-CM

## 2022-06-21 DIAGNOSIS — M79.7 FIBROMYALGIA: ICD-10-CM

## 2022-06-21 DIAGNOSIS — R10.84 STOMACH CRAMPS, GENERALIZED: ICD-10-CM

## 2022-06-21 DIAGNOSIS — F33.2 SEVERE EPISODE OF RECURRENT MAJOR DEPRESSIVE DISORDER, WITHOUT PSYCHOTIC FEATURES (HCC): Primary | ICD-10-CM

## 2022-06-21 DIAGNOSIS — R11.0 NAUSEA: ICD-10-CM

## 2022-06-21 DIAGNOSIS — R10.84 GENERALIZED ABDOMINAL PAIN: ICD-10-CM

## 2022-06-21 DIAGNOSIS — N80.9 ENDOMETRIOSIS: ICD-10-CM

## 2022-06-21 DIAGNOSIS — K21.9 GASTROESOPHAGEAL REFLUX DISEASE, UNSPECIFIED WHETHER ESOPHAGITIS PRESENT: ICD-10-CM

## 2022-06-21 DIAGNOSIS — Z13.79 ENCOUNTER FOR GENETIC TESTING OF FEMALE: ICD-10-CM

## 2022-06-21 DIAGNOSIS — E55.9 HYPOVITAMINOSIS D: ICD-10-CM

## 2022-06-21 DIAGNOSIS — R53.83 FATIGUE, UNSPECIFIED TYPE: ICD-10-CM

## 2022-06-21 DIAGNOSIS — M25.50 MULTIPLE JOINT PAIN: ICD-10-CM

## 2022-06-21 DIAGNOSIS — G89.4 CHRONIC PAIN SYNDROME: ICD-10-CM

## 2022-06-21 PROCEDURE — 1036F TOBACCO NON-USER: CPT | Performed by: NURSE PRACTITIONER

## 2022-06-21 PROCEDURE — G8427 DOCREV CUR MEDS BY ELIG CLIN: HCPCS | Performed by: NURSE PRACTITIONER

## 2022-06-21 PROCEDURE — G8417 CALC BMI ABV UP PARAM F/U: HCPCS | Performed by: NURSE PRACTITIONER

## 2022-06-21 PROCEDURE — 99214 OFFICE O/P EST MOD 30 MIN: CPT | Performed by: NURSE PRACTITIONER

## 2022-06-21 PROCEDURE — 99213 OFFICE O/P EST LOW 20 MIN: CPT

## 2022-06-21 RX ORDER — ESCITALOPRAM OXALATE 10 MG/1
10 TABLET ORAL DAILY
Qty: 30 TABLET | Refills: 2 | Status: SHIPPED | OUTPATIENT
Start: 2022-06-21 | End: 2022-09-30

## 2022-06-21 ASSESSMENT — PATIENT HEALTH QUESTIONNAIRE - PHQ9
SUM OF ALL RESPONSES TO PHQ QUESTIONS 1-9: 16
9. THOUGHTS THAT YOU WOULD BE BETTER OFF DEAD, OR OF HURTING YOURSELF: 2
4. FEELING TIRED OR HAVING LITTLE ENERGY: 2
5. POOR APPETITE OR OVEREATING: 2
SUM OF ALL RESPONSES TO PHQ9 QUESTIONS 1 & 2: 6
2. FEELING DOWN, DEPRESSED OR HOPELESS: 3
1. LITTLE INTEREST OR PLEASURE IN DOING THINGS: 3
3. TROUBLE FALLING OR STAYING ASLEEP: 2
6. FEELING BAD ABOUT YOURSELF - OR THAT YOU ARE A FAILURE OR HAVE LET YOURSELF OR YOUR FAMILY DOWN: 0
7. TROUBLE CONCENTRATING ON THINGS, SUCH AS READING THE NEWSPAPER OR WATCHING TELEVISION: 2
10. IF YOU CHECKED OFF ANY PROBLEMS, HOW DIFFICULT HAVE THESE PROBLEMS MADE IT FOR YOU TO DO YOUR WORK, TAKE CARE OF THINGS AT HOME, OR GET ALONG WITH OTHER PEOPLE: 3
SUM OF ALL RESPONSES TO PHQ QUESTIONS 1-9: 14
SUM OF ALL RESPONSES TO PHQ QUESTIONS 1-9: 16
SUM OF ALL RESPONSES TO PHQ QUESTIONS 1-9: 16

## 2022-06-21 ASSESSMENT — COLUMBIA-SUICIDE SEVERITY RATING SCALE - C-SSRS
1. WITHIN THE PAST MONTH, HAVE YOU WISHED YOU WERE DEAD OR WISHED YOU COULD GO TO SLEEP AND NOT WAKE UP?: YES
5. HAVE YOU STARTED TO WORK OUT OR WORKED OUT THE DETAILS OF HOW TO KILL YOURSELF? DO YOU INTEND TO CARRY OUT THIS PLAN?: NO
3. HAVE YOU BEEN THINKING ABOUT HOW YOU MIGHT KILL YOURSELF?: YES
6. HAVE YOU EVER DONE ANYTHING, STARTED TO DO ANYTHING, OR PREPARED TO DO ANYTHING TO END YOUR LIFE?: NO
4. HAVE YOU HAD THESE THOUGHTS AND HAD SOME INTENTION OF ACTING ON THEM?: YES
2. HAVE YOU ACTUALLY HAD ANY THOUGHTS OF KILLING YOURSELF?: YES
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
## 2022-06-26 PROBLEM — G89.29 CHRONIC BILATERAL LOW BACK PAIN WITH RIGHT-SIDED SCIATICA: Status: RESOLVED | Noted: 2022-01-16 | Resolved: 2022-06-26

## 2022-06-26 PROBLEM — M22.40 CHONDROMALACIA PATELLAE: Status: RESOLVED | Noted: 2017-01-31 | Resolved: 2022-06-26

## 2022-06-26 PROBLEM — E55.9 HYPOVITAMINOSIS D: Status: ACTIVE | Noted: 2022-06-26

## 2022-06-26 PROBLEM — R45.89 ANXIETY ABOUT HEALTH: Status: ACTIVE | Noted: 2022-06-26

## 2022-06-26 PROBLEM — R20.0 NUMBNESS AND TINGLING: Status: RESOLVED | Noted: 2020-07-11 | Resolved: 2022-06-26

## 2022-06-26 PROBLEM — F41.8 ANXIETY ABOUT HEALTH: Status: ACTIVE | Noted: 2022-06-26

## 2022-06-26 PROBLEM — M25.562 BILATERAL CHRONIC KNEE PAIN: Status: RESOLVED | Noted: 2022-01-16 | Resolved: 2022-06-26

## 2022-06-26 PROBLEM — R20.2 NUMBNESS AND TINGLING: Status: RESOLVED | Noted: 2020-07-11 | Resolved: 2022-06-26

## 2022-06-26 PROBLEM — M54.41 CHRONIC BILATERAL LOW BACK PAIN WITH RIGHT-SIDED SCIATICA: Status: RESOLVED | Noted: 2022-01-16 | Resolved: 2022-06-26

## 2022-06-26 PROBLEM — G89.29 BILATERAL CHRONIC KNEE PAIN: Status: RESOLVED | Noted: 2022-01-16 | Resolved: 2022-06-26

## 2022-06-26 PROBLEM — M25.561 BILATERAL CHRONIC KNEE PAIN: Status: RESOLVED | Noted: 2022-01-16 | Resolved: 2022-06-26

## 2022-06-26 PROBLEM — R11.0 NAUSEA: Status: ACTIVE | Noted: 2018-06-04

## 2022-06-26 ASSESSMENT — ENCOUNTER SYMPTOMS
NAUSEA: 1
COUGH: 0
WHEEZING: 0
BLOOD IN STOOL: 1
ABDOMINAL PAIN: 1
SHORTNESS OF BREATH: 0
VOMITING: 1
DIARRHEA: 1
CONSTIPATION: 1
RECTAL PAIN: 1

## 2022-06-30 LAB
APTT: 29.7 SEC (ref 22–32)
BASOPHILS %: 1.87 (ref 0–3)
BASOPHILS ABSOLUTE: 0.14 (ref 0–0.3)
EOSINOPHILS %: 3.08 (ref 0–10)
EOSINOPHILS ABSOLUTE: 0.23 (ref 0–1.1)
HCT VFR BLD CALC: 43.9 % (ref 37–47)
HEMOGLOBIN: 13.8 (ref 12–16)
INR BLD: 1.2 RATIO (ref 0.8–1.2)
LYMPHOCYTE %: 39.21 (ref 20–51.1)
LYMPHOCYTES ABSOLUTE: 2.91 (ref 1–5.5)
MCH RBC QN AUTO: 28.4 PG (ref 28.5–32.5)
MCHC RBC AUTO-ENTMCNC: 31.3 G/DL (ref 32–37)
MCV RBC AUTO: 90.5 FL (ref 80–94)
MONOCYTES %: 5.72 (ref 1.7–9.3)
MONOCYTES ABSOLUTE: 0.42 (ref 0.1–1)
NEUTROPHILS %: 50.12 (ref 42.2–75.2)
NEUTROPHILS ABSOLUTE: 3.72 (ref 2–8.1)
PDW BLD-RTO: 12.6 % (ref 8.5–15.5)
PLATELET # BLD: 361 THOU/MM3 (ref 130–400)
PROTHROMBIN TIME: 13 SEC (ref 9.3–12.5)
RBC: 4.85 M/UL (ref 4.2–5.4)
TSH SERPL DL<=0.05 MIU/L-ACNC: 0.97 MIU/ML (ref 0.49–4.67)
WBC: 7.4 THOU/ML3 (ref 4.8–10.8)

## 2022-07-11 LAB — PAP AGE BASED: NORMAL

## 2022-07-19 DIAGNOSIS — E55.9 HYPOVITAMINOSIS D: ICD-10-CM

## 2022-07-19 RX ORDER — ERGOCALCIFEROL 1.25 MG/1
CAPSULE ORAL
Qty: 12 CAPSULE | Refills: 0 | OUTPATIENT
Start: 2022-07-19

## 2022-07-19 NOTE — TELEPHONE ENCOUNTER
Riosnoe Ramsey is requesting a refill on the following medication(s):  Requested Prescriptions     Pending Prescriptions Disp Refills    vitamin D (ERGOCALCIFEROL) 1.25 MG (01901 UT) CAPS capsule [Pharmacy Med Name: VITAMIN D2 1.25MG(50,000 UNIT)] 12 capsule 0     Sig: take 1 capsule by mouth every week       Last Visit Date (If Applicable):  3/98/8619    Next Visit Date:    Visit date not found

## 2022-08-03 ENCOUNTER — OFFICE VISIT (OUTPATIENT)
Dept: OPTOMETRY | Age: 32
End: 2022-08-03
Payer: COMMERCIAL

## 2022-08-03 DIAGNOSIS — R51.9 NONINTRACTABLE HEADACHE, UNSPECIFIED CHRONICITY PATTERN, UNSPECIFIED HEADACHE TYPE: ICD-10-CM

## 2022-08-03 DIAGNOSIS — H53.2 DIPLOPIA: ICD-10-CM

## 2022-08-03 DIAGNOSIS — H52.203 MYOPIA OF BOTH EYES WITH ASTIGMATISM: Primary | ICD-10-CM

## 2022-08-03 DIAGNOSIS — H52.13 MYOPIA OF BOTH EYES WITH ASTIGMATISM: Primary | ICD-10-CM

## 2022-08-03 PROCEDURE — 92014 COMPRE OPH EXAM EST PT 1/>: CPT | Performed by: OPTOMETRIST

## 2022-08-03 PROCEDURE — 92015 DETERMINE REFRACTIVE STATE: CPT | Performed by: OPTOMETRIST

## 2022-08-03 ASSESSMENT — REFRACTION_MANIFEST
OD_AXIS: 002
OD_CYLINDER: -1.25
OS_CYLINDER: -2.25
OS_AXIS: 001
OD_SPHERE: -2.75
OS_SPHERE: -1.25

## 2022-08-03 ASSESSMENT — REFRACTION_WEARINGRX
OS_AXIS: 003
OD_SPHERE: -2.75
OS_CYLINDER: -2.25
OD_CYLINDER: -1.25
OS_SPHERE: -1.25
OD_AXIS: 178
SPECS_TYPE: SVL

## 2022-08-03 ASSESSMENT — ENCOUNTER SYMPTOMS
GASTROINTESTINAL NEGATIVE: 0
EYES NEGATIVE: 0
RESPIRATORY NEGATIVE: 0
ALLERGIC/IMMUNOLOGIC NEGATIVE: 0

## 2022-08-03 ASSESSMENT — VISUAL ACUITY
OD_PH_CC: 20/20 OU
OD_CC: 20/20 OU
OS_CC: 20/20
CORRECTION_TYPE: GLASSES
METHOD: SNELLEN - LINEAR

## 2022-08-03 ASSESSMENT — SLIT LAMP EXAM - LIDS
COMMENTS: NORMAL
COMMENTS: NORMAL

## 2022-08-03 ASSESSMENT — TONOMETRY
OD_IOP_MMHG: 25
IOP_METHOD: NON-CONTACT AIR PUFF
OS_IOP_MMHG: 26
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
## 2022-08-12 ENCOUNTER — PROCEDURE VISIT (OUTPATIENT)
Dept: OPTOMETRY | Age: 32
End: 2022-08-12
Payer: COMMERCIAL

## 2022-08-12 DIAGNOSIS — H53.2 DIPLOPIA: Primary | ICD-10-CM

## 2022-08-12 DIAGNOSIS — R51.9 NONINTRACTABLE HEADACHE, UNSPECIFIED CHRONICITY PATTERN, UNSPECIFIED HEADACHE TYPE: ICD-10-CM

## 2022-08-12 PROCEDURE — 92134 CPTRZ OPH DX IMG PST SGM RTA: CPT | Performed by: OPTOMETRIST

## 2022-08-12 PROCEDURE — 92083 EXTENDED VISUAL FIELD XM: CPT | Performed by: OPTOMETRIST

## 2022-08-12 PROCEDURE — 92133 CPTRZD OPH DX IMG PST SGM ON: CPT | Performed by: OPTOMETRIST

## 2022-08-12 NOTE — PROGRESS NOTES
[address] [address]  Dept: [phone]  Dept Fax: [phone] 1990  is here today for HVF 24-2, OCT MAC, and OCT ON     Last HVF was completed: n/a  Last OCT was completed: n/a  Last visit: 8/03/22    Patient is scheduled to follow up on 8/22/22       Orders Placed This Encounter   Procedures    [address] - OU - BOTH EYES    Posterior Segment Optic Nerve OCT - OU - Both Eyes    Posterior Segment Retina OCT - OU - Both Eyes         Diagnosis Orders   1. Diplopia  JOHNSON VISUAL FIELD - OU - BOTH EYES    Posterior Segment Optic Nerve OCT - OU - Both Eyes    Posterior Segment Retina OCT - OU - Both Eyes      2. Nonintractable headache, unspecified chronicity pattern, unspecified headache type  JOHNSON VISUAL FIELD - OU - BOTH EYES    Posterior Segment Optic Nerve OCT - OU - Both Eyes    Posterior Segment Retina OCT - OU - Both Eyes           Current Outpatient Medications   Medication Sig Dispense Refill    escitalopram (LEXAPRO) 10 MG tablet Take 1 tablet by mouth daily 30 tablet 2    pantoprazole (PROTONIX) 40 MG tablet take 1 tablet by mouth twice a day 60 tablet 5    mometasone (ELOCON) 0.1 % ointment apply topically every morning      dicyclomine (BENTYL) 20 MG tablet as needed       promethazine (PHENERGAN) 12.5 MG tablet Take 1 tablet by mouth 4 times daily as needed for Nausea 30 tablet 2    Multiple Vitamin (MVI, CELEBRATE, CHEWABLE TABLET) Take 2 tablets by mouth daily      Multiple Vitamins-Minerals (HAIR SKIN AND NAILS FORMULA PO) Take 2 tablets by mouth daily      RA SALINE NASAL SPRAY 0.65 % nasal spray instill 2 sprays into each nostril four times a day       No current facility-administered medications for this visit.

## 2022-08-22 ENCOUNTER — OFFICE VISIT (OUTPATIENT)
Dept: OPTOMETRY | Age: 32
End: 2022-08-22
Payer: COMMERCIAL

## 2022-08-22 VITALS — DIASTOLIC BLOOD PRESSURE: 89 MMHG | SYSTOLIC BLOOD PRESSURE: 120 MMHG

## 2022-08-22 DIAGNOSIS — H04.129 DRY EYE: ICD-10-CM

## 2022-08-22 DIAGNOSIS — R51.9 NONINTRACTABLE HEADACHE, UNSPECIFIED CHRONICITY PATTERN, UNSPECIFIED HEADACHE TYPE: Primary | ICD-10-CM

## 2022-08-22 DIAGNOSIS — H53.2 DIPLOPIA: ICD-10-CM

## 2022-08-22 PROCEDURE — G8427 DOCREV CUR MEDS BY ELIG CLIN: HCPCS | Performed by: OPTOMETRIST

## 2022-08-22 PROCEDURE — 99211 OFF/OP EST MAY X REQ PHY/QHP: CPT

## 2022-08-22 PROCEDURE — 92250 FUNDUS PHOTOGRAPHY W/I&R: CPT | Performed by: OPTOMETRIST

## 2022-08-22 PROCEDURE — 1036F TOBACCO NON-USER: CPT | Performed by: OPTOMETRIST

## 2022-08-22 PROCEDURE — 99214 OFFICE O/P EST MOD 30 MIN: CPT | Performed by: OPTOMETRIST

## 2022-08-22 PROCEDURE — G8417 CALC BMI ABV UP PARAM F/U: HCPCS | Performed by: OPTOMETRIST

## 2022-08-22 RX ORDER — TROPICAMIDE 10 MG/ML
1 SOLUTION/ DROPS OPHTHALMIC ONCE
Status: COMPLETED | OUTPATIENT
Start: 2022-08-22 | End: 2022-08-22

## 2022-08-22 RX ADMIN — TROPICAMIDE 1 DROP: 10 SOLUTION/ DROPS OPHTHALMIC at 15:42

## 2022-08-22 ASSESSMENT — ENCOUNTER SYMPTOMS
ALLERGIC/IMMUNOLOGIC NEGATIVE: 0
EYES NEGATIVE: 0
RESPIRATORY NEGATIVE: 0
GASTROINTESTINAL NEGATIVE: 0

## 2022-08-22 ASSESSMENT — TONOMETRY
OD_IOP_MMHG: 20
IOP_METHOD: NON-CONTACT AIR PUFF
OS_IOP_MMHG: 19

## 2022-08-22 ASSESSMENT — VISUAL ACUITY
OD_CC: 20/20 OU
OD_CC+: -1
CORRECTION_TYPE: GLASSES
OS_CC: 20/20
METHOD: SNELLEN - LINEAR
OD_PH_CC: 20/20 OU
OD_PH_CC+: -1
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
## 2022-08-25 DIAGNOSIS — E55.9 HYPOVITAMINOSIS D: ICD-10-CM

## 2022-08-25 NOTE — TELEPHONE ENCOUNTER
Kaylee Ma is requesting a refill on the following medication(s):  Requested Prescriptions     Pending Prescriptions Disp Refills    vitamin D (ERGOCALCIFEROL) 1.25 MG (57157 UT) CAPS capsule [Pharmacy Med Name: VITAMIN D2 1.25MG(50,000 UNIT)] 12 capsule 0     Sig: take 1 capsule by mouth every week       Last Visit Date (If Applicable):  8/72/8334    Next Visit Date:    Visit date not found

## 2022-08-28 RX ORDER — ERGOCALCIFEROL 1.25 MG/1
CAPSULE ORAL
Qty: 12 CAPSULE | Refills: 0 | Status: SHIPPED | OUTPATIENT
Start: 2022-08-28

## 2022-09-13 ENCOUNTER — OFFICE VISIT (OUTPATIENT)
Dept: FAMILY MEDICINE CLINIC | Age: 32
End: 2022-09-13
Payer: COMMERCIAL

## 2022-09-13 ENCOUNTER — TELEPHONE (OUTPATIENT)
Dept: FAMILY MEDICINE CLINIC | Age: 32
End: 2022-09-13

## 2022-09-13 VITALS
HEIGHT: 67 IN | DIASTOLIC BLOOD PRESSURE: 84 MMHG | OXYGEN SATURATION: 99 % | TEMPERATURE: 98.1 F | WEIGHT: 270 LBS | RESPIRATION RATE: 18 BRPM | BODY MASS INDEX: 42.38 KG/M2 | SYSTOLIC BLOOD PRESSURE: 128 MMHG | HEART RATE: 82 BPM

## 2022-09-13 DIAGNOSIS — J40 BRONCHITIS: Primary | ICD-10-CM

## 2022-09-13 DIAGNOSIS — E66.01 CLASS 3 SEVERE OBESITY DUE TO EXCESS CALORIES WITH BODY MASS INDEX (BMI) OF 40.0 TO 44.9 IN ADULT, UNSPECIFIED WHETHER SERIOUS COMORBIDITY PRESENT (HCC): ICD-10-CM

## 2022-09-13 DIAGNOSIS — J06.9 VIRAL URI: ICD-10-CM

## 2022-09-13 LAB
INFLUENZA A ANTIBODY: NORMAL
INFLUENZA B ANTIBODY: NORMAL
Lab: NORMAL
QC PASS/FAIL: NORMAL
SARS-COV-2 RDRP RESP QL NAA+PROBE: NEGATIVE

## 2022-09-13 PROCEDURE — G8427 DOCREV CUR MEDS BY ELIG CLIN: HCPCS | Performed by: NURSE PRACTITIONER

## 2022-09-13 PROCEDURE — 99213 OFFICE O/P EST LOW 20 MIN: CPT | Performed by: NURSE PRACTITIONER

## 2022-09-13 PROCEDURE — 87635 SARS-COV-2 COVID-19 AMP PRB: CPT | Performed by: NURSE PRACTITIONER

## 2022-09-13 PROCEDURE — 1036F TOBACCO NON-USER: CPT | Performed by: NURSE PRACTITIONER

## 2022-09-13 PROCEDURE — 87804 INFLUENZA ASSAY W/OPTIC: CPT | Performed by: NURSE PRACTITIONER

## 2022-09-13 PROCEDURE — 99211 OFF/OP EST MAY X REQ PHY/QHP: CPT | Performed by: NURSE PRACTITIONER

## 2022-09-13 PROCEDURE — PBSHW POCT INFLUENZA A/B: Performed by: NURSE PRACTITIONER

## 2022-09-13 PROCEDURE — PBSHW PBB SHADOW CHARGE: Performed by: NURSE PRACTITIONER

## 2022-09-13 PROCEDURE — G8417 CALC BMI ABV UP PARAM F/U: HCPCS | Performed by: NURSE PRACTITIONER

## 2022-09-13 PROCEDURE — PBSHW POCT COVID-19 RAPID, NAAT: Performed by: NURSE PRACTITIONER

## 2022-09-13 RX ORDER — SUCRALFATE ORAL 1 G/10ML
SUSPENSION ORAL
COMMUNITY
Start: 2022-08-29

## 2022-09-13 RX ORDER — ALBUTEROL SULFATE 90 UG/1
2 AEROSOL, METERED RESPIRATORY (INHALATION) EVERY 4 HOURS PRN
Qty: 18 G | Refills: 0 | Status: SHIPPED | OUTPATIENT
Start: 2022-09-13

## 2022-09-13 SDOH — ECONOMIC STABILITY: FOOD INSECURITY: WITHIN THE PAST 12 MONTHS, YOU WORRIED THAT YOUR FOOD WOULD RUN OUT BEFORE YOU GOT MONEY TO BUY MORE.: SOMETIMES TRUE

## 2022-09-13 SDOH — ECONOMIC STABILITY: FOOD INSECURITY: WITHIN THE PAST 12 MONTHS, THE FOOD YOU BOUGHT JUST DIDN'T LAST AND YOU DIDN'T HAVE MONEY TO GET MORE.: NEVER TRUE

## 2022-09-13 ASSESSMENT — ENCOUNTER SYMPTOMS
COUGH: 1
VOMITING: 1
RHINORRHEA: 1
SORE THROAT: 1
WHEEZING: 1

## 2022-09-13 ASSESSMENT — SOCIAL DETERMINANTS OF HEALTH (SDOH): HOW HARD IS IT FOR YOU TO PAY FOR THE VERY BASICS LIKE FOOD, HOUSING, MEDICAL CARE, AND HEATING?: SOMEWHAT HARD

## 2022-09-13 NOTE — TELEPHONE ENCOUNTER
Received call from [facility] for nurse triage. Patient woke up this morning with cough, sore throat, chest pain, HA, congestion. Patient is coughing up phlegm. Patients son has appointment today and she is wondering if she should cancel and if she could get in to see Danilo Reyes. Co worker diagnosis with pneumonia yesterday.
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
## 2022-09-13 NOTE — PATIENT INSTRUCTIONS
Albuterol 2 puffs every 4 to 6 hours as needed for cough, wheeze or shortness of breath. Follow up with primary care provider in 1 to 2 days if needed. Treated with over the counter medications if age appropriate. Patient can use Tylenol or Ibuprofen. Over the counter cough syrup if over the age of 10. Antibiotics are not indicated at the present time. Cough treatment if over the age of 1 - 1 teaspoon of (Local honey if able to find) honey mixed with squeezed  fresh lemon juice. Mix in warm water or take directly. This decreases sore throat pain and reduces cough. May be repeated every 2 hours as needed for cough. Advised to follow up if does not get better or symptoms worsen. Go to Urgent Care of ER if you become dehydrated, have breathing difficulty, or have extreme weakness. Cough may last from 4 to 6 weeks.

## 2022-09-13 NOTE — TELEPHONE ENCOUNTER
Spoke with patient- She is ill and also her son also is not feeling well and has pink eye. She is going to go to the walkin clinic so they can both be evaluated.

## 2022-09-20 ENCOUNTER — TELEMEDICINE (OUTPATIENT)
Dept: FAMILY MEDICINE CLINIC | Age: 32
End: 2022-09-20
Payer: COMMERCIAL

## 2022-09-20 DIAGNOSIS — R06.02 SHORTNESS OF BREATH: ICD-10-CM

## 2022-09-20 DIAGNOSIS — J40 BRONCHITIS: Primary | ICD-10-CM

## 2022-09-20 DIAGNOSIS — R06.2 WHEEZING: ICD-10-CM

## 2022-09-20 DIAGNOSIS — R05.1 ACUTE COUGH: ICD-10-CM

## 2022-09-20 PROCEDURE — 99212 OFFICE O/P EST SF 10 MIN: CPT

## 2022-09-20 PROCEDURE — G8427 DOCREV CUR MEDS BY ELIG CLIN: HCPCS | Performed by: NURSE PRACTITIONER

## 2022-09-20 PROCEDURE — 99213 OFFICE O/P EST LOW 20 MIN: CPT | Performed by: NURSE PRACTITIONER

## 2022-09-20 RX ORDER — CEPHALEXIN 500 MG/1
500 CAPSULE ORAL 2 TIMES DAILY
Qty: 20 CAPSULE | Refills: 0 | Status: SHIPPED | OUTPATIENT
Start: 2022-09-20 | End: 2022-09-30

## 2022-09-20 RX ORDER — PROCHLORPERAZINE MALEATE 10 MG
10 TABLET ORAL EVERY 6 HOURS PRN
COMMUNITY
Start: 2022-09-14

## 2022-09-20 RX ORDER — RIZATRIPTAN BENZOATE 10 MG/1
TABLET, ORALLY DISINTEGRATING ORAL
COMMUNITY
Start: 2022-07-06

## 2022-09-20 RX ORDER — PREDNISONE 20 MG/1
20 TABLET ORAL 2 TIMES DAILY
Qty: 10 TABLET | Refills: 0 | Status: SHIPPED | OUTPATIENT
Start: 2022-09-20 | End: 2022-09-25

## 2022-09-20 RX ORDER — NORTRIPTYLINE HYDROCHLORIDE 10 MG/1
20 CAPSULE ORAL NIGHTLY
COMMUNITY
Start: 2022-08-03

## 2022-09-20 RX ORDER — NAPROXEN SODIUM 550 MG/1
550 TABLET ORAL EVERY 8 HOURS PRN
COMMUNITY
Start: 2022-07-06 | End: 2022-10-02

## 2022-09-20 RX ORDER — BENZONATATE 100 MG/1
100 CAPSULE ORAL 3 TIMES DAILY PRN
Qty: 30 CAPSULE | Refills: 1 | Status: SHIPPED | OUTPATIENT
Start: 2022-09-20 | End: 2022-09-27

## 2022-09-20 ASSESSMENT — ENCOUNTER SYMPTOMS
COUGH: 1
NAUSEA: 1
WHEEZING: 1
SINUS PRESSURE: 1
SORE THROAT: 1
SHORTNESS OF BREATH: 1
RHINORRHEA: 1
VOMITING: 1
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
## 2022-10-02 ASSESSMENT — ENCOUNTER SYMPTOMS
DIARRHEA: 0
ABDOMINAL PAIN: 0
CONSTIPATION: 0

## 2022-11-15 ENCOUNTER — OFFICE VISIT (OUTPATIENT)
Dept: FAMILY MEDICINE CLINIC | Age: 32
End: 2022-11-15
Payer: COMMERCIAL

## 2022-11-15 VITALS — DIASTOLIC BLOOD PRESSURE: 80 MMHG | OXYGEN SATURATION: 96 % | SYSTOLIC BLOOD PRESSURE: 110 MMHG | HEART RATE: 78 BPM

## 2022-11-15 DIAGNOSIS — F90.0 ATTENTION DEFICIT HYPERACTIVITY DISORDER (ADHD), PREDOMINANTLY INATTENTIVE TYPE: Primary | ICD-10-CM

## 2022-11-15 DIAGNOSIS — F41.8 ANXIETY ABOUT HEALTH: ICD-10-CM

## 2022-11-15 DIAGNOSIS — K21.9 GASTROESOPHAGEAL REFLUX DISEASE, UNSPECIFIED WHETHER ESOPHAGITIS PRESENT: ICD-10-CM

## 2022-11-15 DIAGNOSIS — R53.83 FATIGUE, UNSPECIFIED TYPE: ICD-10-CM

## 2022-11-15 PROCEDURE — G8484 FLU IMMUNIZE NO ADMIN: HCPCS | Performed by: NURSE PRACTITIONER

## 2022-11-15 PROCEDURE — G8417 CALC BMI ABV UP PARAM F/U: HCPCS | Performed by: NURSE PRACTITIONER

## 2022-11-15 PROCEDURE — 99214 OFFICE O/P EST MOD 30 MIN: CPT | Performed by: NURSE PRACTITIONER

## 2022-11-15 PROCEDURE — 1036F TOBACCO NON-USER: CPT | Performed by: NURSE PRACTITIONER

## 2022-11-15 PROCEDURE — G8427 DOCREV CUR MEDS BY ELIG CLIN: HCPCS | Performed by: NURSE PRACTITIONER

## 2022-11-15 RX ORDER — DEXMETHYLPHENIDATE HYDROCHLORIDE 15 MG/1
15 CAPSULE, EXTENDED RELEASE ORAL DAILY
Qty: 30 CAPSULE | Refills: 0 | Status: SHIPPED | OUTPATIENT
Start: 2022-11-15 | End: 2022-12-15
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
## 2022-11-27 PROBLEM — F90.0 ATTENTION DEFICIT HYPERACTIVITY DISORDER (ADHD), PREDOMINANTLY INATTENTIVE TYPE: Status: ACTIVE | Noted: 2022-11-27

## 2022-11-27 ASSESSMENT — ENCOUNTER SYMPTOMS
NAUSEA: 1
COUGH: 0
VOMITING: 0
ABDOMINAL PAIN: 1
WHEEZING: 0
SHORTNESS OF BREATH: 0

## 2022-12-06 ENCOUNTER — OFFICE VISIT (OUTPATIENT)
Dept: FAMILY MEDICINE CLINIC | Age: 32
End: 2022-12-06
Payer: COMMERCIAL

## 2022-12-06 VITALS
SYSTOLIC BLOOD PRESSURE: 124 MMHG | DIASTOLIC BLOOD PRESSURE: 88 MMHG | HEART RATE: 74 BPM | OXYGEN SATURATION: 98 % | WEIGHT: 267.4 LBS | BODY MASS INDEX: 41.88 KG/M2

## 2022-12-06 DIAGNOSIS — R10.84 STOMACH CRAMPS, GENERALIZED: ICD-10-CM

## 2022-12-06 DIAGNOSIS — F41.8 ANXIETY ABOUT HEALTH: ICD-10-CM

## 2022-12-06 DIAGNOSIS — K21.9 GASTROESOPHAGEAL REFLUX DISEASE, UNSPECIFIED WHETHER ESOPHAGITIS PRESENT: ICD-10-CM

## 2022-12-06 DIAGNOSIS — F90.0 ATTENTION DEFICIT HYPERACTIVITY DISORDER (ADHD), PREDOMINANTLY INATTENTIVE TYPE: Primary | ICD-10-CM

## 2022-12-06 PROCEDURE — 1036F TOBACCO NON-USER: CPT | Performed by: NURSE PRACTITIONER

## 2022-12-06 PROCEDURE — G8417 CALC BMI ABV UP PARAM F/U: HCPCS | Performed by: NURSE PRACTITIONER

## 2022-12-06 PROCEDURE — 99214 OFFICE O/P EST MOD 30 MIN: CPT | Performed by: NURSE PRACTITIONER

## 2022-12-06 PROCEDURE — G8427 DOCREV CUR MEDS BY ELIG CLIN: HCPCS | Performed by: NURSE PRACTITIONER

## 2022-12-06 PROCEDURE — G8484 FLU IMMUNIZE NO ADMIN: HCPCS | Performed by: NURSE PRACTITIONER

## 2022-12-06 RX ORDER — DEXMETHYLPHENIDATE HYDROCHLORIDE 15 MG/1
15 CAPSULE, EXTENDED RELEASE ORAL DAILY
Qty: 30 CAPSULE | Refills: 0 | Status: CANCELLED | OUTPATIENT
Start: 2022-12-06 | End: 2023-01-05

## 2022-12-06 RX ORDER — FEXOFENADINE HCL 180 MG/1
TABLET ORAL
COMMUNITY
Start: 2022-11-29

## 2022-12-06 RX ORDER — DEXMETHYLPHENIDATE HYDROCHLORIDE 20 MG/1
20 CAPSULE, EXTENDED RELEASE ORAL DAILY
Qty: 30 CAPSULE | Refills: 0 | Status: SHIPPED | OUTPATIENT
Start: 2022-12-06 | End: 2023-01-05

## 2022-12-06 ASSESSMENT — ENCOUNTER SYMPTOMS
ABDOMINAL PAIN: 1
CONSTIPATION: 0
WHEEZING: 0
NAUSEA: 0
COUGH: 0
SHORTNESS OF BREATH: 0
DIARRHEA: 0
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
## 2023-01-22 DIAGNOSIS — F90.0 ATTENTION DEFICIT HYPERACTIVITY DISORDER (ADHD), PREDOMINANTLY INATTENTIVE TYPE: ICD-10-CM

## 2023-01-23 NOTE — TELEPHONE ENCOUNTER
Danielito Montes is calling to request a refill on the following medication(s):  Requested Prescriptions     Pending Prescriptions Disp Refills    Dexmethylphenidate HCl ER 20 MG CP24 [Pharmacy Med Name: DEXMETHYLPHENIDATE ER 20 MG CP] 30 capsule      Sig: take 1 capsule by mouth once daily       Last Visit Date (If Applicable):  84/5/7898    Next Visit Date:    3/7/2023

## 2023-01-25 RX ORDER — DEXMETHYLPHENIDATE HYDROCHLORIDE 20 MG/1
CAPSULE, EXTENDED RELEASE ORAL
Qty: 30 CAPSULE | Refills: 0 | Status: SHIPPED | OUTPATIENT
Start: 2023-01-25 | End: 2023-02-24

## 2023-02-27 LAB
BASOPHILS %: 1.65 (ref 0–3)
BASOPHILS ABSOLUTE: 0.2 (ref 0–0.3)
EOSINOPHILS %: 3.31 (ref 0–10)
EOSINOPHILS ABSOLUTE: 0.4 (ref 0–1.1)
HCG QUANTITATIVE: 6888.1 MUNIT/ML (ref 0–5)
HCT VFR BLD CALC: 42.3 % (ref 37–47)
HEMOGLOBIN: 14.1 (ref 12–16)
LYMPHOCYTE %: 29.34 (ref 20–51.1)
LYMPHOCYTES ABSOLUTE: 3.54 (ref 1–5.5)
MCH RBC QN AUTO: 28.9 PG (ref 28.5–32.5)
MCHC RBC AUTO-ENTMCNC: 33.4 G/DL (ref 32–37)
MCV RBC AUTO: 86.5 FL (ref 80–94)
MONOCYTES %: 6.3 (ref 1.7–9.3)
MONOCYTES ABSOLUTE: 0.76 (ref 0.1–1)
NEUTROPHILS %: 59.41 (ref 42.2–75.2)
NEUTROPHILS ABSOLUTE: 7.17 (ref 2–8.1)
PDW BLD-RTO: 12.6 % (ref 8.5–15.5)
PLATELET # BLD: 468 THOU/MM3 (ref 130–400)
RBC: 4.88 M/UL (ref 4.2–5.4)
WBC: 12.1 THOU/ML3 (ref 4.8–10.8)

## 2023-03-07 ENCOUNTER — OFFICE VISIT (OUTPATIENT)
Dept: FAMILY MEDICINE CLINIC | Age: 33
End: 2023-03-07
Payer: COMMERCIAL

## 2023-03-07 VITALS — HEART RATE: 78 BPM | BODY MASS INDEX: 41.69 KG/M2 | OXYGEN SATURATION: 98 % | WEIGHT: 266.2 LBS

## 2023-03-07 DIAGNOSIS — F33.2 SEVERE EPISODE OF RECURRENT MAJOR DEPRESSIVE DISORDER, WITHOUT PSYCHOTIC FEATURES (HCC): ICD-10-CM

## 2023-03-07 DIAGNOSIS — Z3A.01 LESS THAN 8 WEEKS GESTATION OF PREGNANCY: ICD-10-CM

## 2023-03-07 DIAGNOSIS — F90.0 ATTENTION DEFICIT HYPERACTIVITY DISORDER (ADHD), PREDOMINANTLY INATTENTIVE TYPE: Primary | ICD-10-CM

## 2023-03-07 PROCEDURE — G8484 FLU IMMUNIZE NO ADMIN: HCPCS | Performed by: NURSE PRACTITIONER

## 2023-03-07 PROCEDURE — G8417 CALC BMI ABV UP PARAM F/U: HCPCS | Performed by: NURSE PRACTITIONER

## 2023-03-07 PROCEDURE — 1036F TOBACCO NON-USER: CPT | Performed by: NURSE PRACTITIONER

## 2023-03-07 PROCEDURE — 99213 OFFICE O/P EST LOW 20 MIN: CPT | Performed by: NURSE PRACTITIONER

## 2023-03-07 PROCEDURE — G8428 CUR MEDS NOT DOCUMENT: HCPCS | Performed by: NURSE PRACTITIONER

## 2023-03-07 RX ORDER — ASCORBIC ACID, CHOLECALCIFEROL, .ALPHA.-TOCOPHEROL ACETATE, DL-, PYRIDOXINE, FOLIC ACID, CYANOCOBALAMIN, CALCIUM, FERROUS FUMARATE, MAGNESIUM, DOCONEXENT 85; 200; 10; 25; 1; 12; 140; 27; 45; 300 [IU]/1; [IU]/1; [IU]/1; [IU]/1; MG/1; UG/1; MG/1; MG/1; MG/1; MG/1
CAPSULE, GELATIN COATED ORAL
COMMUNITY
Start: 2023-02-27

## 2023-03-07 RX ORDER — BUTALBITAL, ACETAMINOPHEN AND CAFFEINE 50; 325; 40 MG/1; MG/1; MG/1
TABLET ORAL
COMMUNITY
Start: 2023-02-27 | End: 2023-03-18 | Stop reason: ALTCHOICE

## 2023-03-07 RX ORDER — CLOBETASOL PROPIONATE 0.5 MG/G
OINTMENT TOPICAL
COMMUNITY
Start: 2023-02-27 | End: 2023-03-18 | Stop reason: ALTCHOICE

## 2023-03-07 ASSESSMENT — PATIENT HEALTH QUESTIONNAIRE - PHQ9
7. TROUBLE CONCENTRATING ON THINGS, SUCH AS READING THE NEWSPAPER OR WATCHING TELEVISION: 3
2. FEELING DOWN, DEPRESSED OR HOPELESS: 0
SUM OF ALL RESPONSES TO PHQ QUESTIONS 1-9: 7
6. FEELING BAD ABOUT YOURSELF - OR THAT YOU ARE A FAILURE OR HAVE LET YOURSELF OR YOUR FAMILY DOWN: 0
5. POOR APPETITE OR OVEREATING: 0
SUM OF ALL RESPONSES TO PHQ QUESTIONS 1-9: 7
10. IF YOU CHECKED OFF ANY PROBLEMS, HOW DIFFICULT HAVE THESE PROBLEMS MADE IT FOR YOU TO DO YOUR WORK, TAKE CARE OF THINGS AT HOME, OR GET ALONG WITH OTHER PEOPLE: 0
1. LITTLE INTEREST OR PLEASURE IN DOING THINGS: 0
SUM OF ALL RESPONSES TO PHQ QUESTIONS 1-9: 7
SUM OF ALL RESPONSES TO PHQ9 QUESTIONS 1 & 2: 0
SUM OF ALL RESPONSES TO PHQ QUESTIONS 1-9: 7
3. TROUBLE FALLING OR STAYING ASLEEP: 2
4. FEELING TIRED OR HAVING LITTLE ENERGY: 2
9. THOUGHTS THAT YOU WOULD BE BETTER OFF DEAD, OR OF HURTING YOURSELF: 0
8. MOVING OR SPEAKING SO SLOWLY THAT OTHER PEOPLE COULD HAVE NOTICED. OR THE OPPOSITE, BEING SO FIGETY OR RESTLESS THAT YOU HAVE BEEN MOVING AROUND A LOT MORE THAN USUAL: 0

## 2023-03-07 ASSESSMENT — ENCOUNTER SYMPTOMS: NAUSEA: 1

## 2023-03-07 NOTE — PROGRESS NOTES
[address] [facility]  [address]  Dept: [phone]  Dept Fax: [phone]    Date of Service:  3/7/2023    Andree Epps is a 33 y.o. female who presents in office today with Self    Chief Complaint   Patient presents with    ADHD     3 mo f/u just found out is pregnant and stopped medication    Fever     States has been having fevers and was on an abx, states ranges are .5        Diagnoses / Plan:   1. Attention deficit hyperactivity disorder (ADHD), predominantly inattentive type  2. Less than 8 weeks gestation of pregnancy  3. Severe episode of recurrent major depressive disorder, without psychotic features ([facility] [address]     Agree with discontinuing ADHD medication due to pregnancy, also recommend discontinuing escitalopram and discussing medications with OB. Encouraged healthy diet and routine exercise. Instructed to continue current medications. All patient questions answered. Patient voiced understanding. Return if symptoms worsen or fail to improve. Subjective (Review of Systems)   History of Present Illness:  Presents for follow-up of ADHD. She reports medication working well, but discontinued after learning she is pregnant. She complains of a low-grade fever off and on for the past 6 weeks. She has no other concerns today. Review of Systems   Constitutional:  Positive for appetite change and fatigue. Negative for chills and fever. HENT: Negative. Respiratory:  Negative for cough, shortness of breath and wheezing. Gastrointestinal:  Positive for nausea. Negative for vomiting. Genitourinary: Negative. Neurological:  Negative for dizziness, light-headedness and headaches. Psychiatric/Behavioral:  Positive for decreased concentration and dysphoric mood. The patient is nervous/anxious.        PHQ Scores 3/7/2023 6/21/2022 10/18/2021 7/7/2021 7/8/2020 6/22/2020 2/12/2020   PHQ2 Score 0 6 6 0 1 0 1   PHQ9 Score 7 16 22 0 1 0 1     Interpretation of Total Score Depression Severity: 1-4 = Minimal depression, 5-9 = Mild depression, 10-14 = Moderate depression, 15-19 = Moderately severe depression, 20-27 = Severe depression     Reviewed     [x] Past Medical, Family, and Social History was reviewed. [x] Laboratory Results, Vital signs, Imaging, Active Problems, Immunizations, Current/Recently Discontinued Medications, Health Maintenance Activities Due, Referral Notes (if available) were reviewed per writer     [x] Reviewed Depression screening if taken or valid today or any other valid screening tool (others seen below)     Wt Readings from Last 3 Encounters:   03/07/23 266 lb 3.2 oz (120.7 kg)   12/06/22 267 lb 6.4 oz (121.3 kg)   09/13/22 270 lb (122.5 kg)       BP Readings from Last 3 Encounters:   12/06/22 124/88   11/15/22 110/80   09/13/22 128/84       Pulse Readings from Last 3 Encounters:   03/07/23 78   12/06/22 74   11/15/22 78        Current Outpatient Medications   Medication Sig Dispense Refill    Prenat w/o W-WM-Dmrsecx-FA-DHA (PNV-DHA) 27-0.6-0.4-300 MG CAPS take 1 tablet by mouth once daily      fexofenadine (ALLEGRA) 180 MG tablet take 1 tablet by mouth every morning      vitamin D (ERGOCALCIFEROL) 1.25 MG (22016 UT) CAPS capsule take 1 capsule by mouth every week 12 capsule 0    pantoprazole (PROTONIX) 40 MG tablet take 1 tablet by mouth twice a day 60 tablet 5     No current facility-administered medications for this visit. Objective (Physical Assessment)     Vitals:    03/07/23 1402   Pulse: 78   SpO2: 98%   Weight: 266 lb 3.2 oz (120.7 kg)      Estimated body mass index is 41.69 kg/m² as calculated from the following:    Height as of 9/13/22: 5' 7\" (1.702 m). Weight as of this encounter: 266 lb 3.2 oz (120.7 kg). Physical Exam  Constitutional:       General: She is not in acute distress. Appearance: Normal appearance. She is obese. HENT:      Head: Normocephalic.    Eyes: Conjunctiva/sclera: Conjunctivae normal.   Cardiovascular:      Rate and Rhythm: Normal rate and regular rhythm. Heart sounds: Normal heart sounds. No murmur heard. Pulmonary:      Effort: Pulmonary effort is normal.      Breath sounds: Normal breath sounds. No wheezing, rhonchi or rales. Musculoskeletal:      Cervical back: Neck supple. Right lower leg: No edema. Left lower leg: No edema. Lymphadenopathy:      Cervical: No cervical adenopathy. Neurological:      General: No focal deficit present. Mental Status: She is alert and oriented to person, place, and time. Gait: Gait is intact. Gait normal.   Psychiatric:         Attention and Perception: Attention normal.         Mood and Affect: Mood is anxious and depressed. Behavior: Behavior is cooperative. Please note that this chart was generated using voice recognition Dragon dictation software. Although every effort was made to ensure the accuracy of this automated transcription, some errors in transcription may have occurred.     Electronically signed by SUE Gerard CNP on 3/18/2023 at 12:46 PM.

## 2023-03-18 PROBLEM — F33.2 SEVERE EPISODE OF RECURRENT MAJOR DEPRESSIVE DISORDER, WITHOUT PSYCHOTIC FEATURES (HCC): Status: ACTIVE | Noted: 2023-03-18

## 2023-03-18 PROBLEM — R10.84 STOMACH CRAMPS, GENERALIZED: Status: RESOLVED | Noted: 2018-06-04 | Resolved: 2023-03-18

## 2023-03-18 ASSESSMENT — ENCOUNTER SYMPTOMS
SHORTNESS OF BREATH: 0
VOMITING: 0
WHEEZING: 0
COUGH: 0

## 2023-04-05 LAB
C TRACH DNA CVX QL NAA+PROBE: NEGATIVE
GC DNA: NEGATIVE

## 2023-07-12 ENCOUNTER — OFFICE VISIT (OUTPATIENT)
Dept: FAMILY MEDICINE CLINIC | Age: 33
End: 2023-07-12
Payer: COMMERCIAL

## 2023-07-12 VITALS
HEART RATE: 98 BPM | DIASTOLIC BLOOD PRESSURE: 80 MMHG | OXYGEN SATURATION: 96 % | WEIGHT: 293 LBS | SYSTOLIC BLOOD PRESSURE: 116 MMHG | BODY MASS INDEX: 46.42 KG/M2

## 2023-07-12 DIAGNOSIS — R53.83 FATIGUE, UNSPECIFIED TYPE: ICD-10-CM

## 2023-07-12 DIAGNOSIS — E55.9 HYPOVITAMINOSIS D: ICD-10-CM

## 2023-07-12 DIAGNOSIS — R11.0 NAUSEA: ICD-10-CM

## 2023-07-12 DIAGNOSIS — Z3A.24 24 WEEKS GESTATION OF PREGNANCY: ICD-10-CM

## 2023-07-12 DIAGNOSIS — H65.493 CHRONIC OTITIS MEDIA OF BOTH EARS WITH EFFUSION: Primary | ICD-10-CM

## 2023-07-12 LAB — VITAMIN D 25-HYDROXY: 35.8 NG/ML (ref 30–100)

## 2023-07-12 PROCEDURE — 99213 OFFICE O/P EST LOW 20 MIN: CPT | Performed by: NURSE PRACTITIONER

## 2023-07-12 PROCEDURE — 1036F TOBACCO NON-USER: CPT | Performed by: NURSE PRACTITIONER

## 2023-07-12 PROCEDURE — G8427 DOCREV CUR MEDS BY ELIG CLIN: HCPCS | Performed by: NURSE PRACTITIONER

## 2023-07-12 PROCEDURE — G8417 CALC BMI ABV UP PARAM F/U: HCPCS | Performed by: NURSE PRACTITIONER

## 2023-07-12 PROCEDURE — 99212 OFFICE O/P EST SF 10 MIN: CPT | Performed by: NURSE PRACTITIONER

## 2023-07-12 RX ORDER — METOPROLOL TARTRATE 50 MG/1
TABLET, FILM COATED ORAL
COMMUNITY
Start: 2023-07-10

## 2023-07-12 RX ORDER — FLUTICASONE PROPIONATE 50 MCG
1 SPRAY, SUSPENSION (ML) NASAL DAILY
Qty: 32 G | Refills: 1 | Status: SHIPPED | OUTPATIENT
Start: 2023-07-12

## 2023-07-12 ASSESSMENT — ENCOUNTER SYMPTOMS
COUGH: 0
SHORTNESS OF BREATH: 0
WHEEZING: 0
SORE THROAT: 1
NAUSEA: 1

## 2023-07-12 NOTE — PROGRESS NOTES
[address] [facility] [place]  [address] [facility], [address] [place], [address]  Dept: [phone]  Dept Fax: [phone]    Date of Service:  7/12/2023    Alex Colon is a 33 y.o. female who presents in office today with Self. Chief Complaint   Patient presents with    Otalgia     C/o bilateral ear pain left ear is worse than right     Other     Wants vit d levels checked        Diagnoses / Plan:   1. Chronic otitis media of both ears with effusion  -     fluticasone (FLONASE) 50 MCG/ACT nasal spray; 1 spray by Each Nostril route daily, Disp-32 g, R-1Normal  2. Fatigue, unspecified type  3. Hypovitaminosis D  -     Vitamin D 25 Hydroxy; Future  4. Nausea  5. 24 weeks gestation of pregnancy       Start Flonase and otc allergy medication daily to control symptoms. Recommend rest when tired; explained this is a pert of pregnancy. Will check vitamin D as requested. Monitor for worsening symptoms. Return if symptoms worsen or fail to improve. Subjective:   History of Present Illness:  Has been feeling tired lately. Started on vitamin D 2-3 weeks ago and does not feel it is helping. She is 24/5 weeks pregnant. Also having bilateral ear pain. Symptoms started 2 days ago and seems to be worsening. No fever, chills. Current Outpatient Medications   Medication Sig Dispense Refill    metoprolol tartrate (LOPRESSOR) 50 MG tablet       fluticasone (FLONASE) 50 MCG/ACT nasal spray 1 spray by Each Nostril route daily 32 g 1    Prenat w/o U-RP-Vaiiext-FA-DHA (PNV-DHA) 27-0.6-0.4-300 MG CAPS take 1 tablet by mouth once daily      fexofenadine (ALLEGRA) 180 MG tablet take 1 tablet by mouth every morning      vitamin D (ERGOCALCIFEROL) 1.25 MG (65418 UT) CAPS capsule take 1 capsule by mouth every week 12 capsule 0    pantoprazole (PROTONIX) 40 MG tablet take 1 tablet by mouth twice a day 60 tablet 5     No current facility-administered medications for this visit.        Review of Systems

## 2023-07-18 LAB
AMPHETAMINE SCREEN, URINE: NEGATIVE
BARBITURATE SCREEN, URINE: NEGATIVE
BENZODIAZEPINES, URINE SCREEN: NEGATIVE
CANNABINOID SCREEN URINE: NEGATIVE
COCAINE(METAB.)SCREEN, URINE: NEGATIVE
GLUCOSE: 87 MG/DL (ref 65–105)
OPIATE SCREEN, URINE: NEGATIVE
OXYCODONE SCREEN URINE: NEGATIVE
PHENCYCLIDINE SCREEN URINE: NEGATIVE
TRICYCLIC ANTIDEPRESSANTS, UR: NEGATIVE

## 2023-08-01 LAB
BASOPHILS %: 0.5 (ref 0–3)
BASOPHILS ABSOLUTE: 0.05 (ref 0–0.3)
EOSINOPHILS %: 1.66 (ref 0–10)
EOSINOPHILS ABSOLUTE: 0.15 (ref 0–1.1)
HCT VFR BLD CALC: 33.9 % (ref 37–47)
HEMOGLOBIN: 11.2 (ref 12–16)
LYMPHOCYTE %: 25.54 (ref 20–51.1)
LYMPHOCYTES ABSOLUTE: 2.37 (ref 1–5.5)
MCH RBC QN AUTO: 29.6 PG (ref 28.5–32.5)
MCHC RBC AUTO-ENTMCNC: 33.1 G/DL (ref 32–37)
MCV RBC AUTO: 89.5 FL (ref 80–94)
MONOCYTES %: 6.27 (ref 1.7–9.3)
MONOCYTES ABSOLUTE: 0.58 (ref 0.1–1)
NEUTROPHILS %: 66.02 (ref 42.2–75.2)
NEUTROPHILS ABSOLUTE: 6.12 (ref 2–8.1)
PDW BLD-RTO: 13 % (ref 8.5–15.5)
PLATELET # BLD: 279 THOU/MM3 (ref 130–400)
RBC: 3.79 M/UL (ref 4.2–5.4)
WBC: 9.3 THOU/ML3 (ref 4.8–10.8)

## 2023-10-04 ENCOUNTER — INITIAL PRENATAL (OUTPATIENT)
Dept: OBGYN | Age: 33
End: 2023-10-04
Payer: COMMERCIAL

## 2023-10-04 VITALS
BODY MASS INDEX: 51.06 KG/M2 | HEART RATE: 88 BPM | SYSTOLIC BLOOD PRESSURE: 114 MMHG | WEIGHT: 293 LBS | DIASTOLIC BLOOD PRESSURE: 78 MMHG

## 2023-10-04 DIAGNOSIS — F90.0 ATTENTION DEFICIT HYPERACTIVITY DISORDER (ADHD), PREDOMINANTLY INATTENTIVE TYPE: ICD-10-CM

## 2023-10-04 DIAGNOSIS — R00.0 TACHYCARDIA, UNSPECIFIED: ICD-10-CM

## 2023-10-04 DIAGNOSIS — O99.113: ICD-10-CM

## 2023-10-04 DIAGNOSIS — O98.513 HSV-2 INFECTION COMPLICATING PREGNANCY, THIRD TRIMESTER: ICD-10-CM

## 2023-10-04 DIAGNOSIS — Z98.891 HISTORY OF CESAREAN DELIVERY: ICD-10-CM

## 2023-10-04 DIAGNOSIS — E66.01 SEVERE OBESITY DUE TO EXCESS CALORIES AFFECTING PREGNANCY IN THIRD TRIMESTER (HCC): ICD-10-CM

## 2023-10-04 DIAGNOSIS — E28.2 POLYCYSTIC OVARIES: ICD-10-CM

## 2023-10-04 DIAGNOSIS — Z3A.36 36 WEEKS GESTATION OF PREGNANCY: Primary | ICD-10-CM

## 2023-10-04 DIAGNOSIS — D69.1: ICD-10-CM

## 2023-10-04 DIAGNOSIS — E55.9 HYPOVITAMINOSIS D: ICD-10-CM

## 2023-10-04 DIAGNOSIS — B00.9 HSV-2 INFECTION COMPLICATING PREGNANCY, THIRD TRIMESTER: ICD-10-CM

## 2023-10-04 DIAGNOSIS — O34.219 HISTORY OF CESAREAN DELIVERY, CURRENTLY PREGNANT: ICD-10-CM

## 2023-10-04 DIAGNOSIS — D69.1 PLATELET DISORDER (HCC): ICD-10-CM

## 2023-10-04 DIAGNOSIS — K21.9 GASTROESOPHAGEAL REFLUX DISEASE WITHOUT ESOPHAGITIS: ICD-10-CM

## 2023-10-04 DIAGNOSIS — M51.36 BULGING LUMBAR DISC: ICD-10-CM

## 2023-10-04 DIAGNOSIS — O99.213 SEVERE OBESITY DUE TO EXCESS CALORIES AFFECTING PREGNANCY IN THIRD TRIMESTER (HCC): ICD-10-CM

## 2023-10-04 DIAGNOSIS — F33.2 SEVERE EPISODE OF RECURRENT MAJOR DEPRESSIVE DISORDER, WITHOUT PSYCHOTIC FEATURES (HCC): ICD-10-CM

## 2023-10-04 DIAGNOSIS — E66.01 CLASS 3 SEVERE OBESITY DUE TO EXCESS CALORIES WITHOUT SERIOUS COMORBIDITY WITH BODY MASS INDEX (BMI) OF 40.0 TO 44.9 IN ADULT (HCC): ICD-10-CM

## 2023-10-04 DIAGNOSIS — N80.9 ENDOMETRIOSIS: ICD-10-CM

## 2023-10-04 DIAGNOSIS — M79.7 FIBROMYALGIA: ICD-10-CM

## 2023-10-04 DIAGNOSIS — S22.059D CLOSED FRACTURE OF FIFTH THORACIC VERTEBRA WITH ROUTINE HEALING, UNSPECIFIED FRACTURE MORPHOLOGY, SUBSEQUENT ENCOUNTER: ICD-10-CM

## 2023-10-04 PROBLEM — N80.00 ENDOMETRIOSIS OF UTERUS: Status: ACTIVE | Noted: 2023-08-01

## 2023-10-04 PROBLEM — R53.83 FATIGUE: Status: RESOLVED | Noted: 2022-04-24 | Resolved: 2023-10-04

## 2023-10-04 PROBLEM — R11.0 NAUSEA: Status: RESOLVED | Noted: 2018-06-04 | Resolved: 2023-10-04

## 2023-10-04 PROBLEM — F41.8 ANXIETY ABOUT HEALTH: Status: RESOLVED | Noted: 2022-06-26 | Resolved: 2023-10-04

## 2023-10-04 PROBLEM — R10.30 LOWER ABDOMINAL PAIN, UNSPECIFIED: Status: ACTIVE | Noted: 2018-06-04

## 2023-10-04 PROBLEM — R10.30 LOWER ABDOMINAL PAIN, UNSPECIFIED: Status: RESOLVED | Noted: 2018-06-04 | Resolved: 2023-10-04

## 2023-10-04 PROBLEM — R45.89 ANXIETY ABOUT HEALTH: Status: RESOLVED | Noted: 2022-06-26 | Resolved: 2023-10-04

## 2023-10-04 PROBLEM — F41.9 ANXIETY DISORDER, UNSPECIFIED: Status: ACTIVE | Noted: 2022-06-26

## 2023-10-04 PROBLEM — G56.00 CARPAL TUNNEL SYNDROME, UNSPECIFIED UPPER LIMB: Status: ACTIVE | Noted: 2023-07-05

## 2023-10-04 PROBLEM — E66.9 OBESITY, UNSPECIFIED: Status: ACTIVE | Noted: 2017-04-24

## 2023-10-04 PROBLEM — F41.9 ANXIETY DISORDER, UNSPECIFIED: Status: RESOLVED | Noted: 2022-06-26 | Resolved: 2023-10-04

## 2023-10-04 PROCEDURE — 99213 OFFICE O/P EST LOW 20 MIN: CPT | Performed by: OBSTETRICS & GYNECOLOGY

## 2023-10-04 RX ORDER — LANOLIN ALCOHOL/MO/W.PET/CERES
400 CREAM (GRAM) TOPICAL 2 TIMES DAILY
COMMUNITY
Start: 2023-09-05

## 2023-10-04 RX ORDER — METOPROLOL TARTRATE 100 MG/1
100 TABLET ORAL 2 TIMES DAILY
COMMUNITY
Start: 2023-09-15

## 2023-10-04 RX ORDER — VALACYCLOVIR HYDROCHLORIDE 500 MG/1
500 TABLET, FILM COATED ORAL DAILY
COMMUNITY
Start: 2023-09-28

## 2023-10-04 RX ORDER — BUTALBITAL, ACETAMINOPHEN AND CAFFEINE 50; 325; 40 MG/1; MG/1; MG/1
TABLET ORAL
COMMUNITY
Start: 2023-08-31

## 2023-10-04 RX ORDER — HYDROCORTISONE 25 MG/G
CREAM TOPICAL
COMMUNITY
Start: 2023-09-27

## 2023-10-04 RX ORDER — CHOLECALCIFEROL (VITAMIN D3) 1250 MCG
1 CAPSULE ORAL DAILY
COMMUNITY

## 2023-10-04 NOTE — PROGRESS NOTES
(34%ile)      Assessment & Plan:  Alex Colon is a 29 y.o. female  at 44w11d Initial Obstetrical Visit   - The patient was seen full history and physical was completed/reviewed. - Prenatal labs from primary OB reviewed   - Aspirin indication: indicated due to High risk factors: chronic hypertension, Moderate risk factors: BMI >30- Rx given. Patient was told by Hematology not to take due to her delta granule storage pool disorder   - Problem list reviewed and updated   - First trimester screening and MSAFP Single Marker/NIPT: already completed, low risk (See above)   - Role of ultrasound in pregnancy discussed; MFM referral ordered   - Gc/Chlam Cultures: previously completed   - Last pap smear 22- Pap Smear not indicated   - Tdap vaccination: already received  23    - Influenza vaccination: unable to receive   - Rhogam: not indicated   - Indications for  section: pt desires TOLAC; however, I did review the ERAS protocol with her. Sinus tachycardia   -controlled on Metoprolol 100 mg BID, managed by her primary OB    -symptoms currently well controlled    Hx CS x 1   -pt had PROM at 37 weeks, followed by an attempted 3 day induction. She dilated to 7 and underwent PLTCS of a 6#6oz male. She had difficulty with pain control after  (\"pain medication just doesn't work for [facility]"). Unclear if indication for  was arrest of dilation, pt is unsure how long she was 7 cm for. She states her \"hip bone was in the way because it was disclocated but it popped back in to place after delivery\". - calculator chance of success 34.7%. Pt states she would still like to try for a \"natural\" labor. She understands that we will not let her go past 41 weeks and is amenable to induction at 40 weeks if needed.  She also understands that pitocin and angelo bulb, but not cervical ripening, may be used with her history of  so ideally her cervix should be at least 1-2 cm dilated for an

## 2023-10-11 ENCOUNTER — ROUTINE PRENATAL (OUTPATIENT)
Dept: PERINATAL CARE | Age: 33
End: 2023-10-11
Payer: COMMERCIAL

## 2023-10-11 ENCOUNTER — ROUTINE PRENATAL (OUTPATIENT)
Dept: OBGYN | Age: 33
End: 2023-10-11
Payer: COMMERCIAL

## 2023-10-11 ENCOUNTER — HOSPITAL ENCOUNTER (OUTPATIENT)
Age: 33
End: 2023-10-11
Payer: COMMERCIAL

## 2023-10-11 ENCOUNTER — HOSPITAL ENCOUNTER (OUTPATIENT)
Dept: PREADMISSION TESTING | Age: 33
Discharge: HOME | End: 2023-10-11
Payer: COMMERCIAL

## 2023-10-11 ENCOUNTER — HOSPITAL ENCOUNTER (OUTPATIENT)
Age: 33
Discharge: HOME | End: 2023-10-11

## 2023-10-11 VITALS
SYSTOLIC BLOOD PRESSURE: 126 MMHG | HEART RATE: 84 BPM | DIASTOLIC BLOOD PRESSURE: 82 MMHG | WEIGHT: 293 LBS | HEIGHT: 67 IN | RESPIRATION RATE: 16 BRPM | BODY MASS INDEX: 45.99 KG/M2 | TEMPERATURE: 97.2 F

## 2023-10-11 VITALS
TEMPERATURE: 97.5 F | DIASTOLIC BLOOD PRESSURE: 84 MMHG | HEIGHT: 67 IN | OXYGEN SATURATION: 97 % | SYSTOLIC BLOOD PRESSURE: 123 MMHG | HEART RATE: 81 BPM | BODY MASS INDEX: 45.99 KG/M2 | RESPIRATION RATE: 18 BRPM | WEIGHT: 293 LBS

## 2023-10-11 VITALS
SYSTOLIC BLOOD PRESSURE: 117 MMHG | HEART RATE: 91 BPM | DIASTOLIC BLOOD PRESSURE: 77 MMHG | WEIGHT: 293 LBS | BODY MASS INDEX: 52 KG/M2

## 2023-10-11 DIAGNOSIS — D69.1 MATERNAL PLATELET DISORDER AFFECTING PREGNANCY IN THIRD TRIMESTER, ANTEPARTUM (HCC): ICD-10-CM

## 2023-10-11 DIAGNOSIS — O99.413 MATERNAL CARDIOVASCULAR DISEASE AFFECTING PREGNANCY IN THIRD TRIMESTER: ICD-10-CM

## 2023-10-11 DIAGNOSIS — O35.8XX0 SUSPECTED DAMAGE TO FETUS FROM DISEASE IN MOTHER, ANTEPARTUM CONDITION, SINGLE OR UNSPECIFIED FETUS: ICD-10-CM

## 2023-10-11 DIAGNOSIS — O34.219 DESIRES VBAC (VAGINAL BIRTH AFTER CESAREAN) TRIAL: ICD-10-CM

## 2023-10-11 DIAGNOSIS — Z3A.37 37 WEEKS GESTATION OF PREGNANCY: ICD-10-CM

## 2023-10-11 DIAGNOSIS — O16.3 HYPERTENSION AFFECTING PREGNANCY IN THIRD TRIMESTER: Primary | ICD-10-CM

## 2023-10-11 DIAGNOSIS — O99.113 MATERNAL PLATELET DISORDER AFFECTING PREGNANCY IN THIRD TRIMESTER, ANTEPARTUM (HCC): ICD-10-CM

## 2023-10-11 DIAGNOSIS — O99.213 OBESITY AFFECTING PREGNANCY IN THIRD TRIMESTER, UNSPECIFIED OBESITY TYPE: ICD-10-CM

## 2023-10-11 DIAGNOSIS — E66.01 CLASS 3 SEVERE OBESITY DUE TO EXCESS CALORIES WITHOUT SERIOUS COMORBIDITY WITH BODY MASS INDEX (BMI) OF 40.0 TO 44.9 IN ADULT (HCC): ICD-10-CM

## 2023-10-11 DIAGNOSIS — Z3A.37 37 WEEKS GESTATION OF PREGNANCY: Primary | ICD-10-CM

## 2023-10-11 DIAGNOSIS — O34.219 HISTORY OF CESAREAN DELIVERY, CURRENTLY PREGNANT: ICD-10-CM

## 2023-10-11 PROBLEM — I10 CHRONIC HYPERTENSION: Status: ACTIVE | Noted: 2023-10-11

## 2023-10-11 LAB
ABDOMINAL CIRCUMFERENCE: NORMAL
ABO + RH BLD: NORMAL
ARM BAND NUMBER: NORMAL
BIPARIETAL DIAMETER: NORMAL
BLOOD BANK SAMPLE EXPIRATION: NORMAL
BLOOD GROUP ANTIBODIES SERPL: NEGATIVE
ERYTHROCYTE [DISTWIDTH] IN BLOOD BY AUTOMATED COUNT: 14.8 % (ref 11.8–14.4)
ESTIMATED FETAL WEIGHT: NORMAL
FEMORAL DIAMETER: NORMAL
HC/AC: NORMAL
HCT VFR BLD AUTO: 36.8 % (ref 36.3–47.1)
HEAD CIRCUMFERENCE: NORMAL
HGB BLD-MCNC: 12.2 G/DL (ref 11.9–15.1)
MCH RBC QN AUTO: 30.2 PG (ref 25.2–33.5)
MCHC RBC AUTO-ENTMCNC: 33.2 G/DL (ref 28.4–34.8)
MCV RBC AUTO: 91.1 FL (ref 82.6–102.9)
NRBC BLD-RTO: 0 PER 100 WBC
PLATELET # BLD AUTO: 278 K/UL (ref 138–453)
PMV BLD AUTO: 9.1 FL (ref 8.1–13.5)
RBC # BLD AUTO: 4.04 M/UL (ref 3.95–5.11)
WBC OTHER # BLD: 8.4 K/UL (ref 3.5–11.3)

## 2023-10-11 PROCEDURE — G8417 CALC BMI ABV UP PARAM F/U: HCPCS | Performed by: OBSTETRICS & GYNECOLOGY

## 2023-10-11 PROCEDURE — G8427 DOCREV CUR MEDS BY ELIG CLIN: HCPCS | Performed by: OBSTETRICS & GYNECOLOGY

## 2023-10-11 PROCEDURE — 76820 UMBILICAL ARTERY ECHO: CPT | Performed by: OBSTETRICS & GYNECOLOGY

## 2023-10-11 PROCEDURE — 99244 OFF/OP CNSLTJ NEW/EST MOD 40: CPT | Performed by: OBSTETRICS & GYNECOLOGY

## 2023-10-11 PROCEDURE — 36415 COLL VENOUS BLD VENIPUNCTURE: CPT

## 2023-10-11 PROCEDURE — 86900 BLOOD TYPING SEROLOGIC ABO: CPT

## 2023-10-11 PROCEDURE — G8484 FLU IMMUNIZE NO ADMIN: HCPCS | Performed by: OBSTETRICS & GYNECOLOGY

## 2023-10-11 PROCEDURE — 86850 RBC ANTIBODY SCREEN: CPT

## 2023-10-11 PROCEDURE — 3074F SYST BP LT 130 MM HG: CPT | Performed by: OBSTETRICS & GYNECOLOGY

## 2023-10-11 PROCEDURE — 3078F DIAST BP <80 MM HG: CPT | Performed by: OBSTETRICS & GYNECOLOGY

## 2023-10-11 PROCEDURE — 85027 COMPLETE CBC AUTOMATED: CPT

## 2023-10-11 PROCEDURE — 1036F TOBACCO NON-USER: CPT | Performed by: ADVANCED PRACTICE MIDWIFE

## 2023-10-11 PROCEDURE — G8417 CALC BMI ABV UP PARAM F/U: HCPCS | Performed by: ADVANCED PRACTICE MIDWIFE

## 2023-10-11 PROCEDURE — G8484 FLU IMMUNIZE NO ADMIN: HCPCS | Performed by: ADVANCED PRACTICE MIDWIFE

## 2023-10-11 PROCEDURE — G8428 CUR MEDS NOT DOCUMENT: HCPCS | Performed by: ADVANCED PRACTICE MIDWIFE

## 2023-10-11 PROCEDURE — 99213 OFFICE O/P EST LOW 20 MIN: CPT | Performed by: ADVANCED PRACTICE MIDWIFE

## 2023-10-11 PROCEDURE — 76819 FETAL BIOPHYS PROFIL W/O NST: CPT | Performed by: OBSTETRICS & GYNECOLOGY

## 2023-10-11 PROCEDURE — 76811 OB US DETAILED SNGL FETUS: CPT | Performed by: OBSTETRICS & GYNECOLOGY

## 2023-10-11 PROCEDURE — 86901 BLOOD TYPING SEROLOGIC RH(D): CPT

## 2023-10-11 ASSESSMENT — PAIN SCALES - GENERAL: PAINLEVEL_OUTOF10: 5

## 2023-10-11 ASSESSMENT — PAIN DESCRIPTION - LOCATION: LOCATION: PELVIS

## 2023-10-11 ASSESSMENT — PAIN DESCRIPTION - PAIN TYPE: TYPE: CHRONIC PAIN

## 2023-10-11 NOTE — PROGRESS NOTES
Anesthesia Focused Assessment    STOP-BANG Sleep Apnea Questionnaire    SNORE loudly (heard through closed doors)? Yes  TIRED, fatigued, sleepy during daytime? No  OBSERVED stopping breathing during sleep? Yes  High blood PRESSURE being treated? No    BMI over 35? Yes  AGE over 48? No  NECK circumference over 16\"? No  GENDER (male)? No             Total 3  High risk 5-8  Intermediate risk 3-4  Low risk 0-2    Obstructive Sleep Apnea: yes  If YES, machine used: had cpap but was unable to tolerate. Type 1 DM:   no  T2DM:  no    Coronary Artery Disease:  no  Hypertension:  no    Active smoker:  quit 2017, was a smoker for 9 years prior to quitting. Drinks Alcohol:  none currently  History of medical marijuana, none currently    Dentition:     Defib / AICD / Pacemaker: no      Renal Failure/dialysis:  no    Patient was evaluated in PAT & anesthesia guidelines were applied. NPO guidelines, medication instructions and scheduled arrival time were reviewed with patient. I advised patient to please contact the surgeon's office, ahead of time if possible, if any new signs or symptoms of illness, infection, rash, etc    Hx of anesthesia complications:  YES, STATES EPIDURAL \"DID NOT WORK\" DURING THE LAST  (SEE OB/GYN NOTES). Family hx of anesthesia complications:  paternal family has history of \"waking up during surgery\" some PONV. Patient was seen for \"OB Anesthesia consult (high risk). \"  She was sent for anesthesia interview, see anesthesia history as noted above. Also, will need platelet transfusion per OB/GYN and hem/onc, see notes in paper chart. Barbara Diallo PA-C  10/11/23  10:53 AM    Chart/case reviewed by Dr. Dona Whatley and patient seen.   Requesting hem/onc to be contacted to see if epidural/spinal is able to be done for the procedure and if so, what orders/recommendations are needed.

## 2023-10-11 NOTE — PROGRESS NOTES
SUBJECTIVE:    Leland Neumann is here for her routine OB visit. She reports  fetal movement. She denies  vaginal bleeding. She denies  leaking of fluid. She denies  vaginal discharge. She denies  uterine contraction activity. She denies  nausea and/or vomiting. She denies retaining fluid in her extremities. Had consult with anesthesia this am.   Still committed to [facility]. Wants to go until edc. OBJECTIVE:   Blood pressure 117/77, pulse 91, weight (!) 332 lb (150.6 kg), unknown if currently breastfeeding. ASSESSMENT/PLAN:  1. 37 weeks gestation of pregnancy      2. Class 3 severe obesity due to excess calories without serious comorbidity with body mass index (BMI) of 40.0 to 44.9 in adult ([address])      3. History of  delivery, currently pregnant      4. Desires  (vaginal birth after ) trial        The problem list was reviewed and updated as needed. Leland Neumann will monitor for fetal movements daily    GBS protocol and testing was not discussed. GBS culture was obtained. Results were reviewed. Signs of labor to report were discussed. Birth preferences were discussed. Post-dates testing and protocol were not discussed  Leland Neumann was not counseled regarding Post Partum Depression. She has not decided on contraceptive choice. Leland Neumann was counseled regarding all of the above    The patient, Darvin Thomas,  was seen with a total time spent of 20 minutes for the visit on this date of service by the [facility]  The time component, involved both face-to-face (counseling and education)  and non face-to-face time (care coordination), spent in determining the total time component.

## 2023-10-12 LAB
SEND OUT REPORT: NORMAL
TEST NAME: NORMAL

## 2023-10-17 ENCOUNTER — HOSPITAL ENCOUNTER (INPATIENT)
Age: 33
LOS: 4 days | Discharge: HOME | End: 2023-10-21
Attending: OBSTETRICS & GYNECOLOGY | Admitting: OBSTETRICS & GYNECOLOGY
Payer: COMMERCIAL

## 2023-10-17 ENCOUNTER — ANESTHESIA (OUTPATIENT)
Dept: LABOR AND DELIVERY | Age: 33
End: 2023-10-17
Payer: COMMERCIAL

## 2023-10-17 ENCOUNTER — ROUTINE PRENATAL (OUTPATIENT)
Dept: OBGYN | Age: 33
End: 2023-10-17
Payer: COMMERCIAL

## 2023-10-17 ENCOUNTER — ANESTHESIA EVENT (OUTPATIENT)
Dept: LABOR AND DELIVERY | Age: 33
End: 2023-10-17
Payer: COMMERCIAL

## 2023-10-17 VITALS
WEIGHT: 293 LBS | BODY MASS INDEX: 52.78 KG/M2 | HEART RATE: 73 BPM | SYSTOLIC BLOOD PRESSURE: 128 MMHG | DIASTOLIC BLOOD PRESSURE: 84 MMHG

## 2023-10-17 DIAGNOSIS — O09.90 HIGH RISK PREGNANCY, ANTEPARTUM: Primary | ICD-10-CM

## 2023-10-17 DIAGNOSIS — I10 CHRONIC HYPERTENSION: ICD-10-CM

## 2023-10-17 DIAGNOSIS — O34.219 HISTORY OF CESAREAN DELIVERY, CURRENTLY PREGNANT: ICD-10-CM

## 2023-10-17 DIAGNOSIS — R00.0 TACHYCARDIA, UNSPECIFIED: ICD-10-CM

## 2023-10-17 DIAGNOSIS — Z98.891 S/P C-SECTION: Primary | ICD-10-CM

## 2023-10-17 DIAGNOSIS — O34.219 DESIRES VBAC (VAGINAL BIRTH AFTER CESAREAN) TRIAL: ICD-10-CM

## 2023-10-17 DIAGNOSIS — Z3A.38 38 WEEKS GESTATION OF PREGNANCY: ICD-10-CM

## 2023-10-17 PROBLEM — N80.9 ENDOMETRIOSIS: Status: RESOLVED | Noted: 2017-04-24 | Resolved: 2023-10-17

## 2023-10-17 PROBLEM — E55.9 VITAMIN D DEFICIENCY, UNSPECIFIED: Status: RESOLVED | Noted: 2022-06-26 | Resolved: 2023-10-17

## 2023-10-17 PROBLEM — E66.9 OBESITY, UNSPECIFIED: Status: RESOLVED | Noted: 2017-04-24 | Resolved: 2023-10-17

## 2023-10-17 PROBLEM — G89.4 CHRONIC PAIN SYNDROME: Status: RESOLVED | Noted: 2020-04-25 | Resolved: 2023-10-17

## 2023-10-17 PROBLEM — M25.50 MULTIPLE JOINT PAIN: Status: RESOLVED | Noted: 2022-01-16 | Resolved: 2023-10-17

## 2023-10-17 PROBLEM — G56.00 CARPAL TUNNEL SYNDROME, UNSPECIFIED UPPER LIMB: Status: RESOLVED | Noted: 2023-07-05 | Resolved: 2023-10-17

## 2023-10-17 LAB
ALBUMIN SERPL-MCNC: 3.6 G/DL (ref 3.5–5.2)
ALBUMIN/GLOB SERPL: 1.3 {RATIO} (ref 1–2.5)
ALP SERPL-CCNC: 95 U/L (ref 35–104)
ALT SERPL-CCNC: 6 U/L (ref 5–33)
AMPHET UR QL SCN: NEGATIVE
ANION GAP SERPL CALCULATED.3IONS-SCNC: 10 MMOL/L (ref 9–17)
AST SERPL-CCNC: 13 U/L
BARBITURATES UR QL SCN: POSITIVE
BASOPHILS # BLD: <0.03 K/UL (ref 0–0.2)
BASOPHILS NFR BLD: 0 % (ref 0–2)
BENZODIAZ UR QL: NEGATIVE
BILIRUB SERPL-MCNC: <0.1 MG/DL (ref 0.3–1.2)
BUN SERPL-MCNC: 9 MG/DL (ref 6–20)
CALCIUM SERPL-MCNC: 8.9 MG/DL (ref 8.6–10.4)
CANNABINOIDS UR QL SCN: NEGATIVE
CHLORIDE SERPL-SCNC: 104 MMOL/L (ref 98–107)
CO2 SERPL-SCNC: 20 MMOL/L (ref 20–31)
COCAINE UR QL SCN: NEGATIVE
CREAT SERPL-MCNC: 0.5 MG/DL (ref 0.5–0.9)
CREAT UR-MCNC: 70 MG/DL (ref 28–217)
EOSINOPHIL # BLD: 0.1 K/UL (ref 0–0.44)
EOSINOPHILS RELATIVE PERCENT: 1 % (ref 1–4)
ERYTHROCYTE [DISTWIDTH] IN BLOOD BY AUTOMATED COUNT: 15 % (ref 11.8–14.4)
FENTANYL UR QL: NEGATIVE
GFR SERPL CREATININE-BSD FRML MDRD: >60 ML/MIN/1.73M2
GLUCOSE SERPL-MCNC: 103 MG/DL (ref 70–99)
HCT VFR BLD AUTO: 36.8 % (ref 36.3–47.1)
HGB BLD-MCNC: 12.4 G/DL (ref 11.9–15.1)
IMM GRANULOCYTES # BLD AUTO: 0.06 K/UL (ref 0–0.3)
IMM GRANULOCYTES NFR BLD: 1 %
LYMPHOCYTES NFR BLD: 1.9 K/UL (ref 1.1–3.7)
LYMPHOCYTES RELATIVE PERCENT: 22 % (ref 24–43)
MCH RBC QN AUTO: 30.6 PG (ref 25.2–33.5)
MCHC RBC AUTO-ENTMCNC: 33.7 G/DL (ref 28.4–34.8)
MCV RBC AUTO: 90.9 FL (ref 82.6–102.9)
METHADONE UR QL: NEGATIVE
MONOCYTES NFR BLD: 0.58 K/UL (ref 0.1–1.2)
MONOCYTES NFR BLD: 7 % (ref 3–12)
NEUTROPHILS NFR BLD: 69 % (ref 36–65)
NEUTS SEG NFR BLD: 5.87 K/UL (ref 1.5–8.1)
NRBC BLD-RTO: 0 PER 100 WBC
OPIATES UR QL SCN: NEGATIVE
OXYCODONE UR QL SCN: NEGATIVE
PCP UR QL SCN: NEGATIVE
PLATELET # BLD AUTO: 292 K/UL (ref 138–453)
PMV BLD AUTO: 9.3 FL (ref 8.1–13.5)
POTASSIUM SERPL-SCNC: 4 MMOL/L (ref 3.7–5.3)
PROT SERPL-MCNC: 6.3 G/DL (ref 6.4–8.3)
RBC # BLD AUTO: 4.05 M/UL (ref 3.95–5.11)
RBC # BLD: ABNORMAL 10*6/UL
SODIUM SERPL-SCNC: 134 MMOL/L (ref 135–144)
T PALLIDUM AB SER QL IA: NONREACTIVE
TEST INFORMATION: ABNORMAL
TOTAL PROTEIN, URINE: 8 MG/DL
URINE TOTAL PROTEIN CREATININE RATIO: 0.11 (ref 0–0.2)
WBC OTHER # BLD: 8.5 K/UL (ref 3.5–11.3)

## 2023-10-17 PROCEDURE — 80307 DRUG TEST PRSMV CHEM ANLYZR: CPT

## 2023-10-17 PROCEDURE — 3074F SYST BP LT 130 MM HG: CPT

## 2023-10-17 PROCEDURE — 99213 OFFICE O/P EST LOW 20 MIN: CPT

## 2023-10-17 PROCEDURE — 86905 BLOOD TYPING RBC ANTIGENS: CPT

## 2023-10-17 PROCEDURE — 36430 TRANSFUSION BLD/BLD COMPNT: CPT

## 2023-10-17 PROCEDURE — 80053 COMPREHEN METABOLIC PANEL: CPT

## 2023-10-17 PROCEDURE — 86780 TREPONEMA PALLIDUM: CPT

## 2023-10-17 PROCEDURE — 86900 BLOOD TYPING SEROLOGIC ABO: CPT

## 2023-10-17 PROCEDURE — 86922 COMPATIBILITY TEST ANTIGLOB: CPT

## 2023-10-17 PROCEDURE — 1036F TOBACCO NON-USER: CPT

## 2023-10-17 PROCEDURE — 82570 ASSAY OF URINE CREATININE: CPT

## 2023-10-17 PROCEDURE — 86870 RBC ANTIBODY IDENTIFICATION: CPT

## 2023-10-17 PROCEDURE — 6360000002 HC RX W HCPCS

## 2023-10-17 PROCEDURE — 85025 COMPLETE CBC W/AUTO DIFF WBC: CPT

## 2023-10-17 PROCEDURE — 2580000003 HC RX 258

## 2023-10-17 PROCEDURE — 86850 RBC ANTIBODY SCREEN: CPT

## 2023-10-17 PROCEDURE — 86901 BLOOD TYPING SEROLOGIC RH(D): CPT

## 2023-10-17 PROCEDURE — 99222 1ST HOSP IP/OBS MODERATE 55: CPT | Performed by: OBSTETRICS & GYNECOLOGY

## 2023-10-17 PROCEDURE — 84156 ASSAY OF PROTEIN URINE: CPT

## 2023-10-17 PROCEDURE — 86920 COMPATIBILITY TEST SPIN: CPT

## 2023-10-17 PROCEDURE — G8417 CALC BMI ABV UP PARAM F/U: HCPCS

## 2023-10-17 PROCEDURE — P9073 PLATELETS PHERESIS PATH REDU: HCPCS

## 2023-10-17 PROCEDURE — 6370000000 HC RX 637 (ALT 250 FOR IP)

## 2023-10-17 PROCEDURE — 3078F DIAST BP <80 MM HG: CPT

## 2023-10-17 PROCEDURE — G8484 FLU IMMUNIZE NO ADMIN: HCPCS

## 2023-10-17 PROCEDURE — G8427 DOCREV CUR MEDS BY ELIG CLIN: HCPCS

## 2023-10-17 PROCEDURE — 1220000000 HC SEMI PRIVATE OB R&B

## 2023-10-17 RX ORDER — SODIUM CHLORIDE 0.9 % (FLUSH) 0.9 %
5-40 SYRINGE (ML) INJECTION EVERY 12 HOURS SCHEDULED
Status: DISCONTINUED | OUTPATIENT
Start: 2023-10-17 | End: 2023-10-18

## 2023-10-17 RX ORDER — PANTOPRAZOLE SODIUM 40 MG/1
40 TABLET, DELAYED RELEASE ORAL
Status: DISCONTINUED | OUTPATIENT
Start: 2023-10-18 | End: 2023-10-17

## 2023-10-17 RX ORDER — SODIUM CHLORIDE 9 MG/ML
INJECTION, SOLUTION INTRAVENOUS PRN
Status: DISCONTINUED | OUTPATIENT
Start: 2023-10-17 | End: 2023-10-18

## 2023-10-17 RX ORDER — NALBUPHINE HYDROCHLORIDE 10 MG/ML
10 INJECTION, SOLUTION INTRAMUSCULAR; INTRAVENOUS; SUBCUTANEOUS
Status: DISCONTINUED | OUTPATIENT
Start: 2023-10-17 | End: 2023-10-18

## 2023-10-17 RX ORDER — SODIUM CHLORIDE 0.9 % (FLUSH) 0.9 %
5-40 SYRINGE (ML) INJECTION PRN
Status: DISCONTINUED | OUTPATIENT
Start: 2023-10-17 | End: 2023-10-18

## 2023-10-17 RX ORDER — PANTOPRAZOLE SODIUM 40 MG/1
40 TABLET, DELAYED RELEASE ORAL
Status: DISCONTINUED | OUTPATIENT
Start: 2023-10-17 | End: 2023-10-20

## 2023-10-17 RX ORDER — ONDANSETRON 2 MG/ML
4 INJECTION INTRAMUSCULAR; INTRAVENOUS EVERY 6 HOURS PRN
Status: DISCONTINUED | OUTPATIENT
Start: 2023-10-17 | End: 2023-10-18 | Stop reason: SDUPTHER

## 2023-10-17 RX ORDER — ACYCLOVIR 200 MG/1
400 CAPSULE ORAL 3 TIMES DAILY
Status: DISCONTINUED | OUTPATIENT
Start: 2023-10-17 | End: 2023-10-18

## 2023-10-17 RX ORDER — NALOXONE HYDROCHLORIDE 0.4 MG/ML
INJECTION, SOLUTION INTRAMUSCULAR; INTRAVENOUS; SUBCUTANEOUS PRN
Status: DISCONTINUED | OUTPATIENT
Start: 2023-10-17 | End: 2023-10-18

## 2023-10-17 RX ORDER — SODIUM CHLORIDE, SODIUM LACTATE, POTASSIUM CHLORIDE, CALCIUM CHLORIDE 600; 310; 30; 20 MG/100ML; MG/100ML; MG/100ML; MG/100ML
INJECTION, SOLUTION INTRAVENOUS CONTINUOUS
Status: DISCONTINUED | OUTPATIENT
Start: 2023-10-17 | End: 2023-10-18

## 2023-10-17 RX ORDER — ONDANSETRON 2 MG/ML
4 INJECTION INTRAMUSCULAR; INTRAVENOUS EVERY 6 HOURS PRN
Status: DISCONTINUED | OUTPATIENT
Start: 2023-10-17 | End: 2023-10-18

## 2023-10-17 RX ORDER — CETIRIZINE HYDROCHLORIDE 10 MG/1
10 TABLET ORAL DAILY
Status: DISCONTINUED | OUTPATIENT
Start: 2023-10-17 | End: 2023-10-21 | Stop reason: HOSPADM

## 2023-10-17 RX ORDER — FLUTICASONE PROPIONATE 50 MCG
1 SPRAY, SUSPENSION (ML) NASAL 2 TIMES DAILY
Status: DISCONTINUED | OUTPATIENT
Start: 2023-10-17 | End: 2023-10-21 | Stop reason: HOSPADM

## 2023-10-17 RX ORDER — METOPROLOL TARTRATE 50 MG/1
100 TABLET, FILM COATED ORAL 2 TIMES DAILY
Status: DISCONTINUED | OUTPATIENT
Start: 2023-10-17 | End: 2023-10-21 | Stop reason: HOSPADM

## 2023-10-17 RX ORDER — ACETAMINOPHEN 500 MG
1000 TABLET ORAL EVERY 6 HOURS PRN
Status: DISCONTINUED | OUTPATIENT
Start: 2023-10-17 | End: 2023-10-18

## 2023-10-17 RX ORDER — SODIUM CHLORIDE, SODIUM LACTATE, POTASSIUM CHLORIDE, AND CALCIUM CHLORIDE .6; .31; .03; .02 G/100ML; G/100ML; G/100ML; G/100ML
1000 INJECTION, SOLUTION INTRAVENOUS PRN
Status: DISCONTINUED | OUTPATIENT
Start: 2023-10-17 | End: 2023-10-18

## 2023-10-17 RX ORDER — SODIUM CHLORIDE, SODIUM LACTATE, POTASSIUM CHLORIDE, AND CALCIUM CHLORIDE .6; .31; .03; .02 G/100ML; G/100ML; G/100ML; G/100ML
500 INJECTION, SOLUTION INTRAVENOUS PRN
Status: DISCONTINUED | OUTPATIENT
Start: 2023-10-17 | End: 2023-10-18

## 2023-10-17 RX ADMIN — ACYCLOVIR 400 MG: 200 CAPSULE ORAL at 20:17

## 2023-10-17 RX ADMIN — METOPROLOL TARTRATE 100 MG: 50 TABLET, FILM COATED ORAL at 20:17

## 2023-10-17 RX ADMIN — Medication 1 MILLI-UNITS/MIN: at 15:26

## 2023-10-17 RX ADMIN — ACYCLOVIR 400 MG: 200 CAPSULE ORAL at 17:21

## 2023-10-17 RX ADMIN — PANTOPRAZOLE SODIUM 40 MG: 40 TABLET, DELAYED RELEASE ORAL at 22:39

## 2023-10-17 RX ADMIN — Medication 10 MG: at 21:49

## 2023-10-17 RX ADMIN — SODIUM CHLORIDE, POTASSIUM CHLORIDE, SODIUM LACTATE AND CALCIUM CHLORIDE: 600; 310; 30; 20 INJECTION, SOLUTION INTRAVENOUS at 12:34

## 2023-10-17 ASSESSMENT — PAIN SCALES - GENERAL: PAINLEVEL_OUTOF10: 10

## 2023-10-17 NOTE — PROGRESS NOTES
Attending Physician Statement  I have discussed the care of [facility], including pertinent history and exam findings, with the resident. I have reviewed the key elements of all parts of the encounter with the resident. I agree with the assessment, plan and orders as documented by the resident.   (GE Modifier)    Calos Hudson, [facility], [address] [facility]  10/17/2023, 9:44 AM
2022     Priority: Medium    High-risk pregnancy 10/17/2023    38 weeks gestation of pregnancy 10/17/2023    Desires  (vaginal birth after ) trial 10/11/2023    cHTN (on meds)  10/11/2023     Diagnosis established prior to transfer of care   Diagnosis confirmed at initial MFM apt on 10/11/23  Metoprolol daily (however, metoprolol originally diagnosed for tachycardia)   [facility] recommends weekly BPP/NST      HSV-2 infection complicating pregnancy, third trimester 10/04/2023     On Valtrex. Pt states she's never had an outbreak. Delta granule storage pool disorder 10/04/2023     Pt has been evaluated at [facility] and diagnosed with delta granule storage pool disorder. She's had a hemorrhage with a laparoscopy and her cholecystectomy but no PPH with her first . Hematology advised her no aspirin and infusion of 1 unit of platelets 1 hour prior to anticipated delivery. History of  delivery, currently pregnant 10/04/2023     Hx of  at 7 cm. Pt unsure how long she was dilated to 7. Stated \"baby wouldn't fit around my hip\".  calculator chance of successful vaginal delivery is 34.7%. Tachycardia, unspecified 2023     H/o sinus tachycardia, on Metoprolol 100 mg BID from her primary OB. Endometriosis of uterus 2023    Polycystic ovaries 2017    Fibromyalgia 05/10/2016     Return in about 1 week (around 10/24/2023) for PP BP Check .     Josh Schmitz DO  Ob/Gyn Resident  [facility], [place]  10/17/2023, 2:24 PM

## 2023-10-17 NOTE — PROGRESS NOTES
Resident Interval Note    Patient was noted to have positive UDS on admission for barbiturates. She has been previously prescribed Fioricet for migraine headaches and reports recent use of the medication. The results of the UDS can be attributed to her Fioricet use, and there is extremely low clinical suspicion for substance abuse at this time.      Cesia Asher MD  Obstetrics & Gynecology Resident  [address] [place] [address]  10/17/2023 2:28 PM

## 2023-10-17 NOTE — PROGRESS NOTES
Labor Progress Note    Shelby Foy is a 29 y.o. female  at 41w2d  The patient was seen and examined. Her pain is well controlled. She reports fetal movement is present, denies contractions, denies loss of fluid, denies vaginal bleeding.        Vital Signs:  Vitals:    10/17/23 1730 10/17/23 1731 10/17/23 1735 10/17/23 1740   BP:  132/74     Pulse:  84     Resp:       Temp:       TempSrc:       SpO2: 97%  97% 96%       FHT: 145, moderate variability, accelerations present, decelerations absent  Contractions: toco not tracing, previously intermittent    Chaperone for Intimate Exam: Chaperone was present for entire exam, Chaperone Name: GRETA Lucero  Cervical Exam: 4 cm dilated, 50 effaced, -2 station  Pitocin: @ 4 mu/min    Membranes: Intact  Scalp Electrode in place: absent  Intrauterine Pressure Catheter in Place: absent    Interventions: SVE    Assessment/Plan:  Shelby Foy is a 29 y.o. female  at 41w2d admitted for IOL 2/2 cHTN (on meds)   - GBS negative, No indication for GBS prophylaxis   - VSS, afebrile   - CEFM/TOCO showing cat 1 tracing   - S/p angelo balloon   - pitocin per protocol   - Continue to monitor    Attending updated and in agreement with plan    Goodman MD Ashlie  Ob/Gyn Resident  10/17/2023, 6:02 PM

## 2023-10-17 NOTE — PROGRESS NOTES
Obstetric/Gynecology Resident Interval Note    Resident called to bedside by Paolo resident due to difficult angelo balloon placement. SVE performed and patient is 2/50/-2. Patient did not tolerate cervical exam.     As a result, decision made to place angelo balloon with direct visualization of cervix with a speculum which patient did tolerate. Balloon inflated with 60 cc NS. SVE performed once balloon filled and speculum was removed. Correct position of placement confirmed. Patient tolerated procedure well. Plan for low dose pitocin with balloon.     Continue to monitor closely     Madison Quintero MD  OB/GYN Resident, PGY3  [address] [place] [address]  10/17/2023, 2:10 PM

## 2023-10-17 NOTE — DISCHARGE SUMMARY
Obstetric Discharge Summary  [facility]    Patient Name: Jessica Lyons  Patient : 1990  Primary Care Physician: SUE Ang CNP  Admit Date: 10/17/2023    Principal Diagnosis: IUP at 38w4d, admitted for Induction of Labor ([address]) 2/2 cHTN (meds)     Her pregnancy has been complicated by:   Patient Active Problem List   Diagnosis    Polycystic ovaries    Fibromyalgia    Gastro-esophageal reflux disease without esophagitis    Attention deficit hyperactivity disorder (ADHD), predominantly inattentive type    Severe episode of recurrent major depressive disorder, without psychotic features ([address])    HSV-2 infection complicating pregnancy, third trimester    Endometriosis of uterus    Tachycardia, unspecified    Delta granule storage pool disorder    History of  delivery, currently pregnant    Desires  (vaginal birth after ) trial    cHTN (on meds)     High-risk pregnancy    38 weeks gestation of pregnancy    RLTCS 10/18/23 M Apg ** Wt 6#2     Infection Present?: No  Hospital Acquired: No    Surgical Operations & Procedures:  Analgesia: epidural  Delivery Type:  Delivery: See Labor and Delivery Summary   Laceration(s): Absent    Consultations: NICU and Anesthesia    Pertinent Findings & Procedures:   Jessica Lyons is a 29 y.o. female  at 41w2d admitted for Induction of Labor (TOLAC) 2/2 cHTN (meds); received angelo balloon, pitocin, Nubain x1, epidural, IUPC, amnioinfusion. Patient has a history of Delta Storage pool disease. It was recommended by Hematology that she receive 1u platelets prior to Neuraxial anesthesia. Patient received 1u platelets during labor prior to epidural.    Patient had recurrent late decelerations that were unresponsive to all intrauterine resuscitation attempts. She remained 6 cm for 7 hours despite pitocin and AROM. Discussion was held regarding recurrent late decelerations remote from delivery.  Discussed risk to butalbital-acetaminophen-caffeine -40 MG per tablet  Commonly known as: FIORICET, ESGIC     fexofenadine 180 MG tablet  Commonly known as: ALLEGRA     fluticasone 50 MCG/ACT nasal spray  Commonly known as: FLONASE  1 spray by Each Nostril route daily     metoprolol 100 MG tablet  Commonly known as: LOPRESSOR     pantoprazole 40 MG tablet  Commonly known as: PROTONIX  take 1 tablet by mouth twice a day     PNV-DHA 27-0.6-0.4-300 MG Caps     Procto-Med HC 2.5 % Crea rectal cream  Generic drug: hydrocortisone     valACYclovir 500 MG tablet  Commonly known as: VALTREX     vitamin D 1.25 MG (54964 UT) Caps capsule  Commonly known as: ERGOCALCIFEROL  take 1 capsule by mouth every week     Vitamin D3 1.25 MG (93422 UT) Caps            ASK your doctor about these medications      magnesium oxide 400 (240 Mg) MG tablet  Commonly known as: MAG-OX               Where to Get Your Medications        These medications were sent to [facility] [place], [address] [place] [address]      Phone: [phone]   acetaminophen 500 MG tablet  docusate sodium 100 MG capsule  ferrous sulfate 325 (65 Fe) MG tablet  oxyCODONE 5 MG immediate release tablet  senna-docusate 8.6-50 MG per tablet       Activity: pelvic rest x 6 weeks, no driving while on narcotics, no lifting greater than 15 lbs  Diet: regular diet  Follow up: 3 days for BP check and PP appointment    Condition on discharge: stable    Discharge date: 10/21/23    Hernan Dominguez MD  Ob/Gyn Resident    Comments:  Home care and follow-up care were reviewed. Pelvic rest, and birth control were reviewed. Signs and symptoms of mastitis and post partum depression were reviewed. The patient is to notify her physician if any of these occur. The patient was counseled on secondary smoke risks and the increased risk of sudden infant death syndrome and respiratory problems to her baby with exposure.  She was

## 2023-10-17 NOTE — CARE COORDINATION
ANTEPARTUM NOTE    38 weeks gestation of pregnancy [Z3A.38]    Sanam Marquez was admitted to L&D on 10/17/23 for IOL cHTN on meds @ 38w4d    OB GYN Provider: [facility]- She was following with Dr Harsh Garibay at [facility] [place] but was transferred to [facility] due to her desire to [facility]- 1st PreNatal appointment was 10/4    Will meet with patient after delivery to verify name/address/phone/insurance and discuss discharge planning. Anticipate DC home 2 nights after vaginal delivery or 4 nights after C/S delivery as long as hemodynamically stable.

## 2023-10-17 NOTE — H&P
[address] [facility]    Date: 10/17/2023       Time: 12:12 PM   Patient Name: Shelby Foy     Patient : 1990  Room/Bed: 6055/7177-07    Admission Date/Time: 10/17/2023 11:33 AM      CC: Induction of Labor 2/2 cHTN (on meds)       HPI: Shelby Foy is a 29 y.o.  at 38w4d who presents for IOL 2/2 cHTN (meds). The patient reports fetal movement is present, denies contractions, denies loss of fluid, denies vaginal bleeding. Patient denies headache, vision changes, nausea, vomiting, fever, chills, shortness of breath, chest pain, RUQ pain, abdominal pain, diarrhea, change in color/amount/odor of vaginal discharge, dysuria or, hematuria. DATING:  LMP: No LMP recorded (lmp unknown). Patient is pregnant.   Estimated Date of Delivery: 10/27/23   Based on: early ultrasound, at 6 4/7 weeks GA    PREGNANCY RISK FACTORS:  Patient Active Problem List   Diagnosis    Polycystic ovaries    Fibromyalgia    Gastro-esophageal reflux disease without esophagitis    Vitamin D deficiency, unspecified    Attention deficit hyperactivity disorder (ADHD), predominantly inattentive type    Severe episode of recurrent major depressive disorder, without psychotic features ([facility])    HSV-2 infection complicating pregnancy, third trimester    Endometriosis of uterus    Tachycardia, unspecified    Delta granule storage pool disorder    History of  delivery, currently pregnant    Desires  (vaginal birth after ) trial    cHTN (on meds)     High-risk pregnancy        Steroids Given In This Pregnancy:  no     REVIEW OF SYSTEMS:   Constitutional: negative fever, negative chills, negative weight changes   HEENT: negative visual disturbances, negative headaches, negative dizziness, negative hearing loss  Breast: Negative breast abnormalities, negative breast lumps, negative nipple discharge  Respiratory: negative dyspnea, negative cough, negative TOLAC/ consent in chart   - Induction method: Rabago balloon with LDP   - Rabago balloon placed using speculum and ringed forceps   - CEFM/ TOCO   - Diet: Adult regular  - UDS ordered, Informed consent obtained. Discussed R/B/A      HSV-2    - Patient denies ever having outbreak/lesion   - Compliant with Valtrex since 39 weeks gestational age   - Discovered on serology   - Physical exam revealing no active lesions      Desires , Hx C/S x1    -Patient counseled on risk of uterine rupture in setting of TOLAC and would like to proceed with induction of labor at this time   -Written consent for TOLAC and  section obtained      GERD   - Controlled on Protonix   - Clinically asx at this time      ADHD/Depression   - Denies SI/HI   - Mood stable on no meds      Polycystic ovaries   - GTT wnl   - Continue to follow with primary OB/GYN for management      Fibromyalgia    - Clinically asx at this time      Endometriosis   - Continue to follow with primary OB/GYN for management      Tachycardia   - On Lopressor 100mg BID   - HR 80 on admission   - Continue to monitor      Delta granule storage pool disorder   - Patient previously following with hematology   - Recommended to receive 1u platelets 1 hr prior to surgical procedure if C/S indicated   - No history of PPH during previous delivery      BMI 52    Patient Active Problem List    Diagnosis Date Noted    Severe episode of recurrent major depressive disorder, without psychotic features ([address]) 2023     Priority: Medium     Stopped her medication when she found out she was pregnant. Does not remember what she was taking. Asymptomatic now.        Attention deficit hyperactivity disorder (ADHD), predominantly inattentive type 2022     Priority: Medium    Vitamin D deficiency, unspecified 2022     Priority: Medium    Gastro-esophageal reflux disease without esophagitis 2022     Priority: Medium    High-risk pregnancy 10/17/2023    Desires

## 2023-10-18 PROBLEM — Z98.891 S/P C-SECTION: Status: ACTIVE | Noted: 2023-10-18

## 2023-10-18 LAB
ABO/RH: NORMAL
ANTIBODY IDENTIFICATION: NORMAL
ANTIBODY SCREEN: POSITIVE
ANTIGEN TYPE, PATIENT: NORMAL
ARM BAND NUMBER: NORMAL
BLOOD BANK BLOOD PRODUCT EXPIRATION DATE: NORMAL
BLOOD BANK DISPENSE STATUS: NORMAL
BLOOD BANK ISBT PRODUCT BLOOD TYPE: 1700
BLOOD BANK PRODUCT CODE: NORMAL
BLOOD BANK SAMPLE EXPIRATION: NORMAL
BLOOD BANK UNIT TYPE AND RH: NORMAL
BPU ID: NORMAL
COMPONENT: NORMAL
CROSSMATCH RESULT: NORMAL
TRANSFUSION STATUS: NORMAL
UNIT DIVISION: 0
UNIT ISSUE DATE/TIME: NORMAL

## 2023-10-18 PROCEDURE — 3700000001 HC ADD 15 MINUTES (ANESTHESIA): Performed by: OBSTETRICS & GYNECOLOGY

## 2023-10-18 PROCEDURE — 2580000003 HC RX 258: Performed by: NURSE ANESTHETIST, CERTIFIED REGISTERED

## 2023-10-18 PROCEDURE — 2500000003 HC RX 250 WO HCPCS: Performed by: NURSE ANESTHETIST, CERTIFIED REGISTERED

## 2023-10-18 PROCEDURE — 6360000002 HC RX W HCPCS: Performed by: STUDENT IN AN ORGANIZED HEALTH CARE EDUCATION/TRAINING PROGRAM

## 2023-10-18 PROCEDURE — 7100000000 HC PACU RECOVERY - FIRST 15 MIN: Performed by: OBSTETRICS & GYNECOLOGY

## 2023-10-18 PROCEDURE — 2580000003 HC RX 258

## 2023-10-18 PROCEDURE — 3700000000 HC ANESTHESIA ATTENDED CARE: Performed by: OBSTETRICS & GYNECOLOGY

## 2023-10-18 PROCEDURE — 6360000002 HC RX W HCPCS

## 2023-10-18 PROCEDURE — 6370000000 HC RX 637 (ALT 250 FOR IP): Performed by: STUDENT IN AN ORGANIZED HEALTH CARE EDUCATION/TRAINING PROGRAM

## 2023-10-18 PROCEDURE — 51701 INSERT BLADDER CATHETER: CPT

## 2023-10-18 PROCEDURE — 6370000000 HC RX 637 (ALT 250 FOR IP)

## 2023-10-18 PROCEDURE — 2500000003 HC RX 250 WO HCPCS

## 2023-10-18 PROCEDURE — 6360000002 HC RX W HCPCS: Performed by: NURSE ANESTHETIST, CERTIFIED REGISTERED

## 2023-10-18 PROCEDURE — 3609079900 HC CESAREAN SECTION: Performed by: OBSTETRICS & GYNECOLOGY

## 2023-10-18 PROCEDURE — 2709999900 HC NON-CHARGEABLE SUPPLY: Performed by: OBSTETRICS & GYNECOLOGY

## 2023-10-18 PROCEDURE — 30233R1 TRANSFUSION OF NONAUTOLOGOUS PLATELETS INTO PERIPHERAL VEIN, PERCUTANEOUS APPROACH: ICD-10-PCS

## 2023-10-18 PROCEDURE — 59515 CESAREAN DELIVERY: CPT | Performed by: OBSTETRICS & GYNECOLOGY

## 2023-10-18 PROCEDURE — 7100000001 HC PACU RECOVERY - ADDTL 15 MIN: Performed by: OBSTETRICS & GYNECOLOGY

## 2023-10-18 PROCEDURE — 1220000000 HC SEMI PRIVATE OB R&B

## 2023-10-18 PROCEDURE — A4216 STERILE WATER/SALINE, 10 ML: HCPCS

## 2023-10-18 PROCEDURE — 2720000010 HC SURG SUPPLY STERILE: Performed by: OBSTETRICS & GYNECOLOGY

## 2023-10-18 PROCEDURE — 10907ZC DRAINAGE OF AMNIOTIC FLUID, THERAPEUTIC FROM PRODUCTS OF CONCEPTION, VIA NATURAL OR ARTIFICIAL OPENING: ICD-10-PCS

## 2023-10-18 PROCEDURE — 2580000003 HC RX 258: Performed by: STUDENT IN AN ORGANIZED HEALTH CARE EDUCATION/TRAINING PROGRAM

## 2023-10-18 PROCEDURE — 10H07YZ INSERTION OF OTHER DEVICE INTO PRODUCTS OF CONCEPTION, VIA NATURAL OR ARTIFICIAL OPENING: ICD-10-PCS | Performed by: OBSTETRICS & GYNECOLOGY

## 2023-10-18 PROCEDURE — 88307 TISSUE EXAM BY PATHOLOGIST: CPT

## 2023-10-18 PROCEDURE — 3700000025 EPIDURAL BLOCK: Performed by: STUDENT IN AN ORGANIZED HEALTH CARE EDUCATION/TRAINING PROGRAM

## 2023-10-18 RX ORDER — POLYETHYLENE GLYCOL 3350 17 G/17G
17 POWDER, FOR SOLUTION ORAL DAILY PRN
Status: DISCONTINUED | OUTPATIENT
Start: 2023-10-18 | End: 2023-10-21 | Stop reason: HOSPADM

## 2023-10-18 RX ORDER — SODIUM CHLORIDE 0.9 % (FLUSH) 0.9 %
5-40 SYRINGE (ML) INJECTION EVERY 12 HOURS SCHEDULED
Status: DISCONTINUED | OUTPATIENT
Start: 2023-10-18 | End: 2023-10-21 | Stop reason: HOSPADM

## 2023-10-18 RX ORDER — OXYCODONE HYDROCHLORIDE 5 MG/1
5 TABLET ORAL EVERY 4 HOURS PRN
Status: DISCONTINUED | OUTPATIENT
Start: 2023-10-18 | End: 2023-10-21 | Stop reason: HOSPADM

## 2023-10-18 RX ORDER — SCOLOPAMINE TRANSDERMAL SYSTEM 1 MG/1
1 PATCH, EXTENDED RELEASE TRANSDERMAL
Status: DISCONTINUED | OUTPATIENT
Start: 2023-10-18 | End: 2023-10-21 | Stop reason: HOSPADM

## 2023-10-18 RX ORDER — LIDOCAINE HYDROCHLORIDE 10 MG/ML
INJECTION, SOLUTION EPIDURAL; INFILTRATION; INTRACAUDAL; PERINEURAL PRN
Status: DISCONTINUED | OUTPATIENT
Start: 2023-10-18 | End: 2023-10-18 | Stop reason: SDUPTHER

## 2023-10-18 RX ORDER — ACETAMINOPHEN 500 MG
1000 TABLET ORAL ONCE
Status: COMPLETED | OUTPATIENT
Start: 2023-10-18 | End: 2023-10-18

## 2023-10-18 RX ORDER — DIPHENHYDRAMINE HYDROCHLORIDE 50 MG/ML
INJECTION INTRAMUSCULAR; INTRAVENOUS PRN
Status: DISCONTINUED | OUTPATIENT
Start: 2023-10-18 | End: 2023-10-18 | Stop reason: SDUPTHER

## 2023-10-18 RX ORDER — DIPHENHYDRAMINE HYDROCHLORIDE 50 MG/ML
25 INJECTION INTRAMUSCULAR; INTRAVENOUS EVERY 6 HOURS PRN
Status: DISCONTINUED | OUTPATIENT
Start: 2023-10-18 | End: 2023-10-21 | Stop reason: HOSPADM

## 2023-10-18 RX ORDER — ONDANSETRON 2 MG/ML
4 INJECTION INTRAMUSCULAR; INTRAVENOUS EVERY 6 HOURS PRN
Status: DISCONTINUED | OUTPATIENT
Start: 2023-10-18 | End: 2023-10-21 | Stop reason: HOSPADM

## 2023-10-18 RX ORDER — LIDOCAINE HYDROCHLORIDE AND EPINEPHRINE 15; 5 MG/ML; UG/ML
INJECTION, SOLUTION EPIDURAL PRN
Status: DISCONTINUED | OUTPATIENT
Start: 2023-10-18 | End: 2023-10-18 | Stop reason: SDUPTHER

## 2023-10-18 RX ORDER — AZITHROMYCIN 500 MG/1
INJECTION, POWDER, LYOPHILIZED, FOR SOLUTION INTRAVENOUS PRN
Status: DISCONTINUED | OUTPATIENT
Start: 2023-10-18 | End: 2023-10-18 | Stop reason: SDUPTHER

## 2023-10-18 RX ORDER — DEXAMETHASONE SODIUM PHOSPHATE 10 MG/ML
INJECTION, SOLUTION INTRAMUSCULAR; INTRAVENOUS PRN
Status: DISCONTINUED | OUTPATIENT
Start: 2023-10-18 | End: 2023-10-18 | Stop reason: SDUPTHER

## 2023-10-18 RX ORDER — ONDANSETRON 4 MG/1
4 TABLET, ORALLY DISINTEGRATING ORAL EVERY 6 HOURS PRN
Status: DISCONTINUED | OUTPATIENT
Start: 2023-10-18 | End: 2023-10-21 | Stop reason: HOSPADM

## 2023-10-18 RX ORDER — SODIUM CHLORIDE, SODIUM LACTATE, POTASSIUM CHLORIDE, CALCIUM CHLORIDE 600; 310; 30; 20 MG/100ML; MG/100ML; MG/100ML; MG/100ML
INJECTION, SOLUTION INTRAVENOUS CONTINUOUS PRN
Status: DISCONTINUED | OUTPATIENT
Start: 2023-10-18 | End: 2023-10-18 | Stop reason: SDUPTHER

## 2023-10-18 RX ORDER — AMOXICILLIN 250 MG
1 CAPSULE ORAL DAILY
Qty: 30 TABLET | Refills: 1 | Status: SHIPPED | OUTPATIENT
Start: 2023-10-18

## 2023-10-18 RX ORDER — LANOLIN 72 %
OINTMENT (GRAM) TOPICAL
Status: DISCONTINUED | OUTPATIENT
Start: 2023-10-18 | End: 2023-10-21 | Stop reason: HOSPADM

## 2023-10-18 RX ORDER — SIMETHICONE 80 MG
80 TABLET,CHEWABLE ORAL EVERY 6 HOURS PRN
Status: DISCONTINUED | OUTPATIENT
Start: 2023-10-18 | End: 2023-10-21 | Stop reason: HOSPADM

## 2023-10-18 RX ORDER — ACETAMINOPHEN 500 MG
500 TABLET ORAL 4 TIMES DAILY PRN
Qty: 30 TABLET | Refills: 1 | Status: SHIPPED | OUTPATIENT
Start: 2023-10-18

## 2023-10-18 RX ORDER — KETOROLAC TROMETHAMINE 30 MG/ML
30 INJECTION, SOLUTION INTRAMUSCULAR; INTRAVENOUS EVERY 6 HOURS
Status: COMPLETED | OUTPATIENT
Start: 2023-10-18 | End: 2023-10-19

## 2023-10-18 RX ORDER — ENOXAPARIN SODIUM 100 MG/ML
40 INJECTION SUBCUTANEOUS 2 TIMES DAILY
Status: DISCONTINUED | OUTPATIENT
Start: 2023-10-18 | End: 2023-10-21 | Stop reason: HOSPADM

## 2023-10-18 RX ORDER — CYCLOBENZAPRINE HCL 10 MG
10 TABLET ORAL 3 TIMES DAILY PRN
Status: DISCONTINUED | OUTPATIENT
Start: 2023-10-18 | End: 2023-10-21 | Stop reason: HOSPADM

## 2023-10-18 RX ORDER — METRONIDAZOLE 500 MG/1
500 TABLET ORAL EVERY 8 HOURS SCHEDULED
Status: COMPLETED | OUTPATIENT
Start: 2023-10-18 | End: 2023-10-20

## 2023-10-18 RX ORDER — BISACODYL 10 MG
10 SUPPOSITORY, RECTAL RECTAL DAILY PRN
Status: DISCONTINUED | OUTPATIENT
Start: 2023-10-18 | End: 2023-10-21 | Stop reason: HOSPADM

## 2023-10-18 RX ORDER — ROPIVACAINE HYDROCHLORIDE 2 MG/ML
INJECTION, SOLUTION EPIDURAL; INFILTRATION; PERINEURAL PRN
Status: DISCONTINUED | OUTPATIENT
Start: 2023-10-18 | End: 2023-10-18 | Stop reason: SDUPTHER

## 2023-10-18 RX ORDER — CITRIC ACID/SODIUM CITRATE 334-500MG
30 SOLUTION, ORAL ORAL ONCE
Status: COMPLETED | OUTPATIENT
Start: 2023-10-18 | End: 2023-10-18

## 2023-10-18 RX ORDER — CHLOROPROCAINE HYDROCHLORIDE 30 MG/ML
INJECTION, SOLUTION EPIDURAL; INFILTRATION; INTRACAUDAL; PERINEURAL PRN
Status: DISCONTINUED | OUTPATIENT
Start: 2023-10-18 | End: 2023-10-18 | Stop reason: SDUPTHER

## 2023-10-18 RX ORDER — TRANEXAMIC ACID 10 MG/ML
1000 INJECTION, SOLUTION INTRAVENOUS ONCE
Status: COMPLETED | OUTPATIENT
Start: 2023-10-18 | End: 2023-10-18

## 2023-10-18 RX ORDER — SODIUM CHLORIDE, SODIUM LACTATE, POTASSIUM CHLORIDE, CALCIUM CHLORIDE 600; 310; 30; 20 MG/100ML; MG/100ML; MG/100ML; MG/100ML
INJECTION, SOLUTION INTRAVENOUS CONTINUOUS
Status: DISCONTINUED | OUTPATIENT
Start: 2023-10-18 | End: 2023-10-18

## 2023-10-18 RX ORDER — KETOROLAC TROMETHAMINE 30 MG/ML
INJECTION, SOLUTION INTRAMUSCULAR; INTRAVENOUS PRN
Status: DISCONTINUED | OUTPATIENT
Start: 2023-10-18 | End: 2023-10-18 | Stop reason: SDUPTHER

## 2023-10-18 RX ORDER — ONDANSETRON 2 MG/ML
INJECTION INTRAMUSCULAR; INTRAVENOUS PRN
Status: DISCONTINUED | OUTPATIENT
Start: 2023-10-18 | End: 2023-10-18 | Stop reason: SDUPTHER

## 2023-10-18 RX ORDER — IBUPROFEN 600 MG/1
600 TABLET ORAL EVERY 6 HOURS
Status: DISCONTINUED | OUTPATIENT
Start: 2023-10-19 | End: 2023-10-21 | Stop reason: HOSPADM

## 2023-10-18 RX ORDER — MORPHINE SULFATE 1 MG/ML
INJECTION, SOLUTION EPIDURAL; INTRATHECAL; INTRAVENOUS PRN
Status: DISCONTINUED | OUTPATIENT
Start: 2023-10-18 | End: 2023-10-18 | Stop reason: SDUPTHER

## 2023-10-18 RX ORDER — SODIUM CHLORIDE 9 MG/ML
INJECTION, SOLUTION INTRAVENOUS PRN
Status: DISCONTINUED | OUTPATIENT
Start: 2023-10-18 | End: 2023-10-21 | Stop reason: HOSPADM

## 2023-10-18 RX ORDER — SENNA AND DOCUSATE SODIUM 50; 8.6 MG/1; MG/1
1 TABLET, FILM COATED ORAL 2 TIMES DAILY
Status: DISCONTINUED | OUTPATIENT
Start: 2023-10-18 | End: 2023-10-21 | Stop reason: HOSPADM

## 2023-10-18 RX ORDER — DOCUSATE SODIUM 100 MG/1
100 CAPSULE, LIQUID FILLED ORAL 2 TIMES DAILY
Qty: 60 CAPSULE | Refills: 0 | Status: SHIPPED | OUTPATIENT
Start: 2023-10-18 | End: 2023-11-17

## 2023-10-18 RX ORDER — ACETAMINOPHEN 500 MG
1000 TABLET ORAL EVERY 6 HOURS
Status: DISCONTINUED | OUTPATIENT
Start: 2023-10-18 | End: 2023-10-21 | Stop reason: HOSPADM

## 2023-10-18 RX ORDER — VITAMIN A, ASCORBIC ACID, CHOLECALCIFEROL, .ALPHA.-TOCOPHEROL ACETATE, DL-, THIAMINE MONONITRATE, RIBOFLAVIN, NIACINAMIDE, PYRIDOXINE HYDROCHLORIDE, FOLIC ACID, CYANOCOBALAMIN, CALCIUM CARBONATE, IRON, ZINC OXIDE, AND CUPRIC OXIDE 4000; 120; 400; 22; 1.84; 3; 20; 10; 1; 12; 200; 29; 25; 2 [IU]/1; MG/1; [IU]/1; [IU]/1; MG/1; MG/1; MG/1; MG/1; MG/1; UG/1; MG/1; MG/1; MG/1; MG/1
1 TABLET ORAL DAILY
Status: DISCONTINUED | OUTPATIENT
Start: 2023-10-18 | End: 2023-10-21 | Stop reason: HOSPADM

## 2023-10-18 RX ORDER — CEPHALEXIN 500 MG/1
500 CAPSULE ORAL EVERY 8 HOURS SCHEDULED
Status: COMPLETED | OUTPATIENT
Start: 2023-10-18 | End: 2023-10-20

## 2023-10-18 RX ORDER — SODIUM CHLORIDE 0.9 % (FLUSH) 0.9 %
5-40 SYRINGE (ML) INJECTION PRN
Status: DISCONTINUED | OUTPATIENT
Start: 2023-10-18 | End: 2023-10-21 | Stop reason: HOSPADM

## 2023-10-18 RX ORDER — OXYCODONE HYDROCHLORIDE 5 MG/1
10 TABLET ORAL EVERY 4 HOURS PRN
Status: DISCONTINUED | OUTPATIENT
Start: 2023-10-18 | End: 2023-10-21 | Stop reason: HOSPADM

## 2023-10-18 RX ORDER — NALOXONE HYDROCHLORIDE 0.4 MG/ML
0.4 INJECTION, SOLUTION INTRAMUSCULAR; INTRAVENOUS; SUBCUTANEOUS PRN
Status: DISCONTINUED | OUTPATIENT
Start: 2023-10-18 | End: 2023-10-21 | Stop reason: HOSPADM

## 2023-10-18 RX ORDER — OXYCODONE HYDROCHLORIDE 5 MG/1
5 TABLET ORAL EVERY 6 HOURS PRN
Qty: 20 TABLET | Refills: 0 | Status: SHIPPED | OUTPATIENT
Start: 2023-10-18 | End: 2023-10-23

## 2023-10-18 RX ADMIN — SODIUM CHLORIDE, POTASSIUM CHLORIDE, SODIUM LACTATE AND CALCIUM CHLORIDE: 600; 310; 30; 20 INJECTION, SOLUTION INTRAVENOUS at 03:12

## 2023-10-18 RX ADMIN — MORPHINE SULFATE 2 MG: 1 INJECTION, SOLUTION EPIDURAL; INTRATHECAL; INTRAVENOUS at 04:51

## 2023-10-18 RX ADMIN — Medication 3000 MG: at 03:50

## 2023-10-18 RX ADMIN — FAMOTIDINE 20 MG: 10 INJECTION, SOLUTION INTRAVENOUS at 03:49

## 2023-10-18 RX ADMIN — FLUTICASONE PROPIONATE 1 SPRAY: 50 SPRAY, METERED NASAL at 11:48

## 2023-10-18 RX ADMIN — ACETAMINOPHEN 1000 MG: 500 TABLET ORAL at 03:46

## 2023-10-18 RX ADMIN — Medication 1 TABLET: at 09:22

## 2023-10-18 RX ADMIN — ACETAMINOPHEN 1000 MG: 500 TABLET ORAL at 22:49

## 2023-10-18 RX ADMIN — METRONIDAZOLE 500 MG: 500 TABLET, FILM COATED ORAL at 22:49

## 2023-10-18 RX ADMIN — DIPHENHYDRAMINE HYDROCHLORIDE 12.5 MG: 50 INJECTION INTRAMUSCULAR; INTRAVENOUS at 04:14

## 2023-10-18 RX ADMIN — MORPHINE SULFATE 4 MG: 1 INJECTION, SOLUTION EPIDURAL; INTRATHECAL; INTRAVENOUS at 05:17

## 2023-10-18 RX ADMIN — ACETAMINOPHEN 1000 MG: 500 TABLET ORAL at 16:21

## 2023-10-18 RX ADMIN — KETOROLAC TROMETHAMINE 30 MG: 30 INJECTION, SOLUTION INTRAMUSCULAR; INTRAVENOUS at 11:42

## 2023-10-18 RX ADMIN — ENOXAPARIN SODIUM 40 MG: 100 INJECTION SUBCUTANEOUS at 22:51

## 2023-10-18 RX ADMIN — LIDOCAINE HYDROCHLORIDE,EPINEPHRINE BITARTRATE 4 ML: 15; .005 INJECTION, SOLUTION EPIDURAL; INFILTRATION; INTRACAUDAL; PERINEURAL at 01:10

## 2023-10-18 RX ADMIN — PANTOPRAZOLE SODIUM 40 MG: 40 TABLET, DELAYED RELEASE ORAL at 09:22

## 2023-10-18 RX ADMIN — ONDANSETRON 4 MG: 2 INJECTION INTRAMUSCULAR; INTRAVENOUS at 04:13

## 2023-10-18 RX ADMIN — FLUTICASONE PROPIONATE 1 SPRAY: 50 SPRAY, METERED NASAL at 22:50

## 2023-10-18 RX ADMIN — CETIRIZINE HYDROCHLORIDE 10 MG: 10 TABLET ORAL at 09:22

## 2023-10-18 RX ADMIN — DOCUSATE SODIUM 50 MG AND SENNOSIDES 8.6 MG 1 TABLET: 8.6; 5 TABLET, FILM COATED ORAL at 22:50

## 2023-10-18 RX ADMIN — SODIUM CITRATE AND CITRIC ACID MONOHYDRATE 30 ML: 500; 334 SOLUTION ORAL at 03:46

## 2023-10-18 RX ADMIN — MORPHINE SULFATE 2 MG: 1 INJECTION, SOLUTION EPIDURAL; INTRATHECAL; INTRAVENOUS at 04:47

## 2023-10-18 RX ADMIN — CEPHALEXIN 500 MG: 500 CAPSULE ORAL at 22:49

## 2023-10-18 RX ADMIN — OXYCODONE HYDROCHLORIDE 10 MG: 5 TABLET ORAL at 18:35

## 2023-10-18 RX ADMIN — CHLOROPROCAINE HYDROCHLORIDE 20 ML: 30 INJECTION, SOLUTION EPIDURAL; INFILTRATION; INTRACAUDAL; PERINEURAL at 04:08

## 2023-10-18 RX ADMIN — HYDROMORPHONE HYDROCHLORIDE 0.5 MG: 1 INJECTION, SOLUTION INTRAMUSCULAR; INTRAVENOUS; SUBCUTANEOUS at 06:15

## 2023-10-18 RX ADMIN — PHENYLEPHRINE HYDROCHLORIDE 15 MCG/MIN: 10 INJECTION INTRAVENOUS at 04:25

## 2023-10-18 RX ADMIN — SODIUM CHLORIDE, POTASSIUM CHLORIDE, SODIUM LACTATE AND CALCIUM CHLORIDE: 600; 310; 30; 20 INJECTION, SOLUTION INTRAVENOUS at 03:57

## 2023-10-18 RX ADMIN — AZITHROMYCIN MONOHYDRATE 500 MG: 500 INJECTION, POWDER, LYOPHILIZED, FOR SOLUTION INTRAVENOUS at 04:07

## 2023-10-18 RX ADMIN — ROPIVACAINE HYDROCHLORIDE 12 ML/HR: 2 INJECTION, SOLUTION EPIDURAL; INFILTRATION at 01:20

## 2023-10-18 RX ADMIN — ROPIVACAINE HYDROCHLORIDE 11 ML: 2 INJECTION, SOLUTION EPIDURAL; INFILTRATION; PERINEURAL at 01:20

## 2023-10-18 RX ADMIN — KETOROLAC TROMETHAMINE 30 MG: 30 INJECTION INTRAMUSCULAR; INTRAVENOUS at 05:27

## 2023-10-18 RX ADMIN — LIDOCAINE HYDROCHLORIDE 3 ML: 10 INJECTION, SOLUTION EPIDURAL; INFILTRATION; INTRACAUDAL; PERINEURAL at 01:05

## 2023-10-18 RX ADMIN — TRANEXAMIC ACID 1000 MG: 10 INJECTION, SOLUTION INTRAVENOUS at 04:08

## 2023-10-18 RX ADMIN — METOPROLOL TARTRATE 100 MG: 50 TABLET, FILM COATED ORAL at 09:22

## 2023-10-18 RX ADMIN — PHENYLEPHRINE HYDROCHLORIDE 100 MCG: 10 INJECTION INTRAVENOUS at 04:40

## 2023-10-18 RX ADMIN — KETOROLAC TROMETHAMINE 30 MG: 30 INJECTION, SOLUTION INTRAMUSCULAR; INTRAVENOUS at 20:08

## 2023-10-18 RX ADMIN — MORPHINE SULFATE 2 MG: 1 INJECTION, SOLUTION EPIDURAL; INTRATHECAL; INTRAVENOUS at 04:57

## 2023-10-18 RX ADMIN — DOCUSATE SODIUM 50 MG AND SENNOSIDES 8.6 MG 1 TABLET: 8.6; 5 TABLET, FILM COATED ORAL at 09:22

## 2023-10-18 RX ADMIN — OXYCODONE HYDROCHLORIDE 10 MG: 5 TABLET ORAL at 13:48

## 2023-10-18 RX ADMIN — DEXAMETHASONE SODIUM PHOSPHATE 10 MG: 10 INJECTION, SOLUTION INTRAMUSCULAR; INTRAVENOUS at 04:14

## 2023-10-18 RX ADMIN — SODIUM CHLORIDE, PRESERVATIVE FREE 10 ML: 5 INJECTION INTRAVENOUS at 20:10

## 2023-10-18 RX ADMIN — LIDOCAINE HYDROCHLORIDE 2 ML: 10 INJECTION, SOLUTION EPIDURAL; INFILTRATION; INTRACAUDAL; PERINEURAL at 01:07

## 2023-10-18 RX ADMIN — ACETAMINOPHEN 1000 MG: 500 TABLET ORAL at 09:22

## 2023-10-18 ASSESSMENT — PAIN - FUNCTIONAL ASSESSMENT
PAIN_FUNCTIONAL_ASSESSMENT: ACTIVITIES ARE NOT PREVENTED

## 2023-10-18 ASSESSMENT — PAIN SCALES - GENERAL
PAINLEVEL_OUTOF10: 5
PAINLEVEL_OUTOF10: 5
PAINLEVEL_OUTOF10: 8
PAINLEVEL_OUTOF10: 7
PAINLEVEL_OUTOF10: 7
PAINLEVEL_OUTOF10: 5
PAINLEVEL_OUTOF10: 3
PAINLEVEL_OUTOF10: 3

## 2023-10-18 ASSESSMENT — PAIN DESCRIPTION - LOCATION
LOCATION: INCISION
LOCATION: ABDOMEN
LOCATION: ABDOMEN
LOCATION: INCISION
LOCATION: INCISION
LOCATION: ABDOMEN
LOCATION: INCISION
LOCATION: ABDOMEN
LOCATION: INCISION

## 2023-10-18 ASSESSMENT — PAIN DESCRIPTION - DESCRIPTORS
DESCRIPTORS: CRAMPING;DISCOMFORT
DESCRIPTORS: CRAMPING;DISCOMFORT
DESCRIPTORS: CRAMPING
DESCRIPTORS: CRAMPING;DISCOMFORT
DESCRIPTORS: BURNING

## 2023-10-18 ASSESSMENT — PAIN DESCRIPTION - ORIENTATION
ORIENTATION: LOWER
ORIENTATION: LOWER
ORIENTATION: MID;LOWER
ORIENTATION: LOWER

## 2023-10-18 NOTE — FLOWSHEET NOTE
Complaints today: none    Pt reports irregular ctx. Denies VB, LOF. Normal FM    /76   Wt 68.4 kg (150 lb 12.7 oz)   BMI 24.34 kg/m²     21 y.o., at 39w4d by Estimated Date of Delivery: 19  Patient Active Problem List   Diagnosis    Chlamydia infection affecting pregnancy, antepartum/minh neg 3/6    Supervision of normal first pregnancy, antepartum/tdap/flu    Antepartum anemia    Encounter for initial prescription of implantable subdermal contraceptive     OB History    Para Term  AB Living   1             SAB TAB Ectopic Multiple Live Births                  # Outcome Date GA Lbr Keshav/2nd Weight Sex Delivery Anes PTL Lv   1 Current                Dating reviewed    Allergies and problem list reviewed and updated    Medical and surgical history reviewed    Prenatal labs reviewed and updated    Physical Exam:  ABD: soft, gravid, nontender, FHTs confirmed  SVE: /-3    Assessment:  Pt is a 21 y.o.  at 39w4d who presents for routine OB follow up    Plan:   1. Supervision of normal first pregnancy, antepartum/tdap/flu  - IOL scheduled for  at     2. Antepartum anemia      kick counts, labor precautions       Missy Doan MD   OBGYN, PGY-1        Scarlet Mejia CRNA, at bedside. Epidural procedure explained, risks discussed. Pt verbalizes consent for epidural.   [id number] patient positioned for epidural. 0050 Time out completed. 0109 catheter placed. 0110 test dose given. Epidural catheter taped and secured per anesthesia. 0116 to low fowlers with left uterine displacement. 0118 loading dose given. 0118 pump initiated. Pt tolerated procedure well.

## 2023-10-18 NOTE — CONSULTS
INPATIENT CONSULT    Maternal /para status:     Maternal breastfeeding history:   Breast fed her 9 y/o son for four months with supplement d/t low milk supply. Reports this baby latched and fed well in [facility]     Current pregnancy:    Gestational age: 38.5 weeks     C/section or vaginal delivery: C/S      Birth weight: 2795  6# 2oz      SGA/LGA/IUGR/diabetes during pregnancy:        Plan for feeding: breast      Breast pump at home: yes        Assessment of breastfeeding:  Taught cross cradle, baby readily latched with sustained suck for 10 minutes.   Remains in STS         Reviewed:   - Breastfeeding packet  - Expectations for normal  feeding   - Hand expression  - Deep latch/milk transfer  - Cues for feeding (early/late)     Encouraged:   - Frequent skin to skin with mom or dad  - Frequent attempts to feed  - Calling for assistance as needed

## 2023-10-18 NOTE — CARE COORDINATION
CASE MANAGEMENT POST-PARTUM TRANSITIONAL CARE PLAN    38 weeks gestation of pregnancy [Z3A.38]    OB Provider: Pilar Mccurdy. MD Sandra at [facility] AT [place] but was transferred to [facility] due to her desire to [facility] CARE SERVICES- 1st PreNatal appointment was 10/4- She verbalized she wants her Post Partum visit w/ Dr. Denia Carter met w/ Skylar Marie, FOB/ Julius and her aunt Adriana Bates at her bedside to discuss DCP. She is S/P CS on 10/18/23 @ 0428 at 38w4d of male infant    Writer verified address/phone number correct on facesheet. She states she lives with her  and their son. She verbalized no difficulties with transportation to and from doctors appointments or with paying for medications upon discharge home. [facility] [place] Medicaid insurance correct. Writer notified Skylar Frank they  have 30 days from date of birth to add  to insurance policy by contacting [address] [place] [address]. She verbalized understanding. She confirmed a safe place for infant to sleep at home. Infant name on BC: Quintin. Infant PCP Zina ROGERS. DME: none  HOME CARE: none    Anticipate DC home of couplet in private vehicle in 2-4 days status post C/S.     Readmission Risk              Risk of Unplanned Readmission:  6

## 2023-10-18 NOTE — ANESTHESIA PROCEDURE NOTES
Epidural Block    Patient location during procedure: OB  Reason for block: labor epidural  Staffing  Performed: resident/CRNA   Resident/CRNA: SUE Pelaez CRNA  Performed by: SUE Pelaez CRNA  Authorized by: Sharita Oliveira MD    Epidural  Patient position: sitting  Prep: Betadine  Patient monitoring: continuous pulse ox and frequent blood pressure checks  Approach: midline  Location: L3-4  Injection technique: DUYEN saline  Provider prep: mask and sterile gloves  Needle  Needle type: Tuohy   Needle gauge: 17 G  Needle length: 3.5 in  Needle insertion depth: 7 cm  Catheter type: multi-orifice  Catheter size: 19 G  Catheter at skin depth: 13 cm  Test dose: negativeCatheter Secured: tegaderm and tape  Assessment  Hemodynamics: stable  Attempts: 3+  Outcomes: uncomplicated  Preanesthetic Checklist  Completed: patient identified, IV checked, site marked, risks and benefits discussed, surgical/procedural consents, equipment checked, pre-op evaluation, timeout performed, anesthesia consent given, oxygen available and monitors applied/VS acknowledged

## 2023-10-18 NOTE — PROGRESS NOTES
Labor Progress Note    Melia Flores is a 29 y.o. female  at 41w2d  The patient was seen and examined. Her pain is not well controlled, but she is not requesting an epidural at this time. She reports fetal movement is present, complains of contractions, denies loss of fluid, denies vaginal bleeding.        Vital Signs:  Vitals:    10/17/23 1740 10/17/23 1801 10/17/23 1915 10/17/23 2100   BP:  134/74 136/74 139/84   Pulse:  79 82 73   Resp:  16     Temp:       TempSrc:       SpO2: 96% 97% 97%          FHT: 150, moderate variability, accelerations present, early decelerations, occasional variable decelerations  Contractions: regular, every 2-3 minutes    Chaperone for Intimate Exam: Chaperone was present\ for entire exam, Chaperone Name: RN  Cervical Exam: 4 cm dilated, 50 effaced, -2 station  Pitocin: @ 6 mu/min    Membranes: Intact  Scalp Electrode in place: absent  Intrauterine Pressure Catheter in Place: absent    Interventions: SVE    Assessment/Plan:  Melia Flores is a 29 y.o. female  at 41w2d admitted for TOLAC 2/2 cHTN (on meds)   - GBS negative, No indication for GBS prophylaxis   - VSS, afebrile   - cEFM/TOCO cat 1, early decelerations, occasional variable decelerations   - SVE: /-2   - S/p angelo    - PreE labs wnl, P/C 0.11   - Normotensive   - Denies s/sx PreE   - 1u of platelets are prepared in case of need for spinal anesthesia   - Continue to monitor closely      Attending updated and in agreement with plan    Smiley Beard MD  Ob/Gyn Resident  10/17/2023, 8:27 PM

## 2023-10-18 NOTE — OP NOTE
Operative Note  Department of Obstetrics and Gynecology  [facility]     Patient: Aleshia Delacruz   : 1990  MRN: [id number]       Acct: [de-identified]   PCP: SUE Garnica CNP  Date of Procedure: 10/18/23    Pre-operative Diagnosis: 29 y.o. female  at 38w5d   Induction of labor secondary to chronic hypertension on medication  Trial of labor after   Category 2 tracing remote from delivery, resistant to intrauterine resuscitation   Positive antibody screen  HSV-2  Depression  Polycystic ovaries  Fibromyalgia  Endometriosis  Tachycardia  Delta granule storage pool disorder  Urine drug screen positive for barbiturates    BMI 52     Post-operative Diagnosis:   Induction of labor secondary to chronic hypertension on medication  Trial of labor after   Category 2 tracing remote from delivery, resistant to intrauterine resuscitation   Postpartum Hemorrhage with QBL of 1545 ml  Positive antibody screen  HSV-2  Depression  Polycystic ovaries  Fibromyalgia  Endometriosis  Tachycardia  Delta granule storage pool disorder  Urine drug screen positive for barbiturates    BMI 52  Same as above and  living infant male    Procedure: repeat low transverse  section    Indications: Aleshia Delacruz is a 29 y.o.  at 40w9d gestation who presented for TOLAC IOL 2/2 cHTN (meds). During labor the patient received angelo balloon, pitocin, Nubain x1, epidural, and IUPC. She progressed to 7/90/-1, but did not progress past 6-7 cm for 5 hours. On SVE, baby was noted to be asynclitic. During this time, patient's FHT was category due to recurrent late decelerations and minimal variability. Decision was made to proceed to the OR secondary to category 2 tracing remote from delivery. Patient was agreeable to this management.      Surgeon: Dr. Kelly Pryor  Assistants: Nadara Cheadle, PGY4, Daphnie Muir MD, PGY1    Anesthesia: epidural with duramorph    Procedure Details   The

## 2023-10-18 NOTE — PROGRESS NOTES
Obstetric/Gynecology Resident Interval Note    Called to bedside by patient for repeat cervical check. Cervical changed to 6/70/-1. She would like an epidural at this time. Epidural ordered.      Tavia Veloz MD  OB/GYN Resident, PGY1  [place], [place]  10/17/2023, 10:48 PM

## 2023-10-18 NOTE — ANESTHESIA PRE PROCEDURE
Department of Anesthesiology  Preprocedure Note       Name:  Aleshia Delacruz   Age:  29 y.o.  :  1990                                          MRN:  [id number]         Date:  10/18/2023      Surgeon: * No surgeons listed *    Procedure: * No procedures listed *    Medications prior to admission:   Prior to Admission medications    Medication Sig Start Date End Date Taking?  Authorizing Provider   butalbital-acetaminophen-caffeine (FIORICET, ESGIC) -40 MG per tablet take 1 tablet by mouth every 4 hours AS NEEDED FOR PAIN (MUST LAST 30 DAYS) 23   Thiago Moore MD   PROCTO-MED HC 2.5 % CREA rectal cream apply to affected area TWO TO FOUR TIMES A DAY if needed for HEMORRHOIDS 23   Thiago Moore MD   magnesium oxide (MAG-OX) 400 (240 Mg) MG tablet Take 1 tablet by mouth 2 times daily  Patient not taking: Reported on 10/17/2023 9/5/23   Thiago Moore MD   valACYclovir (VALTREX) 500 MG tablet Take 1 tablet by mouth daily 23   Thiago Moore MD   metoprolol (LOPRESSOR) 100 MG tablet Take 1 tablet by mouth 2 times daily 9/15/23   Thiago Moore MD   Cholecalciferol (VITAMIN D3) 1.25 MG (54765 UT) CAPS Take 1 capsule by mouth daily    Thiago Moore MD   fluticasone (FLONASE) 50 MCG/ACT nasal spray 1 spray by Each Nostril route daily  Patient taking differently: 1 spray by Each Nostril route in the morning and at bedtime 23   SUE Garnica CNP   Prenat w/o Z-KA-Qudytsq-FA-DHA (PNV-DHA) 27-0.6-0.4-300 MG CAPS take 1 tablet by mouth once daily 23   Thiago Moore MD   fexofenadine (ALLEGRA) 180 MG tablet take 1 tablet by mouth every morning 22   Thiago Moore MD   vitamin D (ERGOCALCIFEROL) 1.25 MG (82584 UT) CAPS capsule take 1 capsule by mouth every week 22   SUE Garnica CNP   pantoprazole (PROTONIX) 40 MG tablet take 1 tablet by mouth twice a day 22   SUE Garnica CNP       Current

## 2023-10-18 NOTE — CARE COORDINATION
Social Work     Sw reviewed medical record (current active problem list) and tox screens and found that moms GABI at delivery is positive Barbiturate, but moms medical record shows mom is prescribed Fioricet, so not a concern. Sw spoke with mom briefly to explain Sw role, inquire if any needs or concerns, and provide safe sleep education and discuss. Mom denied any needs or questions and informs baby has a safe sleep environment (bassinet, crib, and pack in play). Mom states that she is linked with WIC. Mom is also linked with a women's care [employer] group which provides her with things for baby. Mom states that are in family therapy at [facility]. Mom denied any current s/s of anxiety or depression and is aware to reach out to OB if any s/s occur after dc. Mom reports a good support system and denied any current questions or needs. Mom support system consists of OLGA Vasquez). Mom reports this is her 2nd baby. Mom also has a 8year old. Mom resides with [facility] and her 8year old. Mom states ped will be [place] in [facility]. No current concerns, baby is clear to discharge with mom. Sw encouraged mom to reach out if any issues or concerns arise.     Documented by sw intern Jenny Ash

## 2023-10-18 NOTE — BRIEF OP NOTE
Department of Obstetrics and Gynecology  Obstetrical Brief Operative Report  [facility]    Patient: Lemuel Levy   : 1990  MRN: [id number]       Acct: [de-identified]   Date of Procedure: 10/18/23    Pre-operative Diagnosis: 29 y.o. female  at 38w5d   Induction of labor secondary to chronic hypertension on medication  Trial of labor after   Category 2 tracing remote from delivery, resistant to intrauterine resuscitation   Positive antibody screen  HSV-2  Depression  Polycystic ovaries  Fibromyalgia  Endometriosis  Tachycardia  Delta granule storage pool disorder  Urine drug screen positive for barbiturates    BMI 52        Post-operative Diagnosis:   Induction of labor secondary to chronic hypertension on medication  Trial of labor after   Category 2 tracing remote from delivery, resistant to intrauterine resuscitation   Positive antibody screen  HSV-2  Depression  Polycystic ovaries  Fibromyalgia  Endometriosis  Tachycardia  Delta granule storage pool disorder  Urine drug screen positive for barbiturates    BMI 52  Same as above and  living infant male    Procedure: repeat low transverse  section    Surgeon: Dr. Dc Axon  Assistant(s): Mikki Buenrostro; Gabino Grigsby MD, PGY1; [facility]    Anesthesia: epidural bolused    Information for the patient's :  Javid Parekh [[id number]]   male   Birth Weight: 2.795 kg (6 lb 2.6 oz)   Information for the patient's :  Javid Parekh [[id number]]        Findings:  Live Born 6 lb 2 oz male infant in cephalic presentation with Apgars of 8 at 1 minute and 9 at five minutes, normal appearing uterus tubes and ovaries   Estimated Blood Loss: pending  Total IV Fluids: 1000ml  Urine output: 300 clear urine   Drains:  angelo catheter  Specimens:  placenta sent to pathology, cord blood, and cord gases  Instrument and Sponge Count: Correct  Complications: none  Condition: Infant stable, transfer to [facility], Mother stable, transfer to post anesthesia recovery    See operative report for full details.     Rebecca Salazar MD  Ob/Gyn Resident  10/18/2023, 5:54 AM

## 2023-10-18 NOTE — PROGRESS NOTES
OB Attending    Strip reviewed. Despite position changes and cessation of Pitocin, late decelerations continue. Where variability has always been moderate, variability is currently decreased. I performed SVE and patient is 90/7/0 station, asyncylitic with fetal scalp stim. After check, variability improves. I recommended proceeding to the OR for Category II tracing. The patient and her family agrees. Will proceed to the OR shortly.

## 2023-10-18 NOTE — PROGRESS NOTES
Labor Progress Note    Maria Eugenia Sheth is a 29 y.o. female  at 41w2d  The patient was seen and examined. Her pain is not well controlled, requesting Nubain for pain control, she is considering epidural at this point . She reports fetal movement is present, complains of contractions, denies loss of fluid, denies vaginal bleeding.        Vital Signs:  Vitals:    10/17/23 1801 10/17/23 1915 10/17/23 2100 10/17/23 2200   BP: 134/74 136/74 139/84 124/72   Pulse: 79 82 73    Resp: 16      Temp:       TempSrc:       SpO2: 97% 97%           FHT:  145 , moderate variability, accelerations present, variable decelerations occasionally, late decelerations frequently  Contractions: regular, every 2-3 minutes    Chaperone for Intimate Exam: Chaperone was present for entire exam, Chaperone Name: RN  Cervical Exam: 6 cm dilated, 50 effaced, -2 station  Pitocin: @ 6 mu/min    Membranes: Intact  Scalp Electrode in place: absent  Intrauterine Pressure Catheter in Place: absent    Interventions: SVE    Assessment/Plan:  Maria Eugenia Sheth is a 29 y.o. female  at 41w2d admitted for TOLAC/IOL 2/2 cHTN (on meds)   - GBS negative, No indication for GBS prophylaxis   - VSS, afebrile   - cEFM/TOCO cat 2, occasional variable deceleration, frequent late decelerations   - Repositioning improved fetal heart tracing   - S/p angelo   - Increased pain, ordered Nubain   - Considering epidural   - PreE labs wnl, P/C 0.11   - Denies PreE s/sx   - Continue pitocin per protocol   - Continue to monitor closely    Attending updated and in agreement with plan    Abby Mccall MD  Ob/Gyn Resident  10/17/2023, 9:57 PM

## 2023-10-19 LAB
BASOPHILS # BLD: 0.04 K/UL (ref 0–0.2)
BASOPHILS NFR BLD: 0 % (ref 0–2)
EOSINOPHIL # BLD: 0.16 K/UL (ref 0–0.44)
EOSINOPHILS RELATIVE PERCENT: 1 % (ref 1–4)
ERYTHROCYTE [DISTWIDTH] IN BLOOD BY AUTOMATED COUNT: 15.5 % (ref 11.8–14.4)
HCT VFR BLD AUTO: 29.8 % (ref 36.3–47.1)
HGB BLD-MCNC: 9.6 G/DL (ref 11.9–15.1)
IMM GRANULOCYTES # BLD AUTO: 0.11 K/UL (ref 0–0.3)
IMM GRANULOCYTES NFR BLD: 1 %
LYMPHOCYTES NFR BLD: 2.82 K/UL (ref 1.1–3.7)
LYMPHOCYTES RELATIVE PERCENT: 21 % (ref 24–43)
MCH RBC QN AUTO: 30.5 PG (ref 25.2–33.5)
MCHC RBC AUTO-ENTMCNC: 32.2 G/DL (ref 28.4–34.8)
MCV RBC AUTO: 94.6 FL (ref 82.6–102.9)
MONOCYTES NFR BLD: 0.88 K/UL (ref 0.1–1.2)
MONOCYTES NFR BLD: 7 % (ref 3–12)
NEUTROPHILS NFR BLD: 70 % (ref 36–65)
NEUTS SEG NFR BLD: 9.15 K/UL (ref 1.5–8.1)
NRBC BLD-RTO: 0 PER 100 WBC
PLATELET # BLD AUTO: 228 K/UL (ref 138–453)
PMV BLD AUTO: 8.8 FL (ref 8.1–13.5)
RBC # BLD AUTO: 3.15 M/UL (ref 3.95–5.11)
RBC # BLD: ABNORMAL 10*6/UL
WBC OTHER # BLD: 13.2 K/UL (ref 3.5–11.3)

## 2023-10-19 PROCEDURE — 6370000000 HC RX 637 (ALT 250 FOR IP): Performed by: STUDENT IN AN ORGANIZED HEALTH CARE EDUCATION/TRAINING PROGRAM

## 2023-10-19 PROCEDURE — 1220000000 HC SEMI PRIVATE OB R&B

## 2023-10-19 PROCEDURE — 6360000002 HC RX W HCPCS: Performed by: STUDENT IN AN ORGANIZED HEALTH CARE EDUCATION/TRAINING PROGRAM

## 2023-10-19 PROCEDURE — 36415 COLL VENOUS BLD VENIPUNCTURE: CPT

## 2023-10-19 PROCEDURE — 2580000003 HC RX 258: Performed by: STUDENT IN AN ORGANIZED HEALTH CARE EDUCATION/TRAINING PROGRAM

## 2023-10-19 PROCEDURE — 85025 COMPLETE CBC W/AUTO DIFF WBC: CPT

## 2023-10-19 RX ORDER — FERROUS SULFATE 325(65) MG
325 TABLET ORAL
Qty: 90 TABLET | Refills: 1 | Status: SHIPPED | OUTPATIENT
Start: 2023-10-19

## 2023-10-19 RX ADMIN — SODIUM CHLORIDE, PRESERVATIVE FREE 10 ML: 5 INJECTION INTRAVENOUS at 08:09

## 2023-10-19 RX ADMIN — OXYCODONE HYDROCHLORIDE 10 MG: 5 TABLET ORAL at 21:11

## 2023-10-19 RX ADMIN — IBUPROFEN 600 MG: 600 TABLET, FILM COATED ORAL at 23:43

## 2023-10-19 RX ADMIN — CEPHALEXIN 500 MG: 500 CAPSULE ORAL at 21:17

## 2023-10-19 RX ADMIN — SODIUM CHLORIDE, PRESERVATIVE FREE 10 ML: 5 INJECTION INTRAVENOUS at 21:15

## 2023-10-19 RX ADMIN — ACETAMINOPHEN 1000 MG: 500 TABLET ORAL at 11:26

## 2023-10-19 RX ADMIN — DOCUSATE SODIUM 50 MG AND SENNOSIDES 8.6 MG 1 TABLET: 8.6; 5 TABLET, FILM COATED ORAL at 08:08

## 2023-10-19 RX ADMIN — OXYCODONE HYDROCHLORIDE 5 MG: 5 TABLET ORAL at 16:57

## 2023-10-19 RX ADMIN — IBUPROFEN 600 MG: 600 TABLET, FILM COATED ORAL at 16:54

## 2023-10-19 RX ADMIN — METRONIDAZOLE 500 MG: 500 TABLET, FILM COATED ORAL at 06:23

## 2023-10-19 RX ADMIN — OXYCODONE HYDROCHLORIDE 10 MG: 5 TABLET ORAL at 06:25

## 2023-10-19 RX ADMIN — ENOXAPARIN SODIUM 40 MG: 100 INJECTION SUBCUTANEOUS at 21:12

## 2023-10-19 RX ADMIN — Medication 1 TABLET: at 08:08

## 2023-10-19 RX ADMIN — OXYCODONE HYDROCHLORIDE 5 MG: 5 TABLET ORAL at 13:28

## 2023-10-19 RX ADMIN — CEPHALEXIN 500 MG: 500 CAPSULE ORAL at 06:23

## 2023-10-19 RX ADMIN — CEPHALEXIN 500 MG: 500 CAPSULE ORAL at 13:32

## 2023-10-19 RX ADMIN — ENOXAPARIN SODIUM 40 MG: 100 INJECTION SUBCUTANEOUS at 08:08

## 2023-10-19 RX ADMIN — KETOROLAC TROMETHAMINE 30 MG: 30 INJECTION, SOLUTION INTRAMUSCULAR; INTRAVENOUS at 04:03

## 2023-10-19 RX ADMIN — CETIRIZINE HYDROCHLORIDE 10 MG: 10 TABLET ORAL at 08:08

## 2023-10-19 RX ADMIN — FLUTICASONE PROPIONATE 1 SPRAY: 50 SPRAY, METERED NASAL at 08:08

## 2023-10-19 RX ADMIN — FLUTICASONE PROPIONATE 1 SPRAY: 50 SPRAY, METERED NASAL at 21:13

## 2023-10-19 RX ADMIN — PANTOPRAZOLE SODIUM 40 MG: 40 TABLET, DELAYED RELEASE ORAL at 08:08

## 2023-10-19 RX ADMIN — METRONIDAZOLE 500 MG: 500 TABLET, FILM COATED ORAL at 21:17

## 2023-10-19 RX ADMIN — ACETAMINOPHEN 1000 MG: 500 TABLET ORAL at 16:54

## 2023-10-19 RX ADMIN — IBUPROFEN 600 MG: 600 TABLET, FILM COATED ORAL at 11:27

## 2023-10-19 RX ADMIN — METRONIDAZOLE 500 MG: 500 TABLET, FILM COATED ORAL at 13:32

## 2023-10-19 RX ADMIN — DOCUSATE SODIUM 50 MG AND SENNOSIDES 8.6 MG 1 TABLET: 8.6; 5 TABLET, FILM COATED ORAL at 21:12

## 2023-10-19 RX ADMIN — METOPROLOL TARTRATE 50 MG: 50 TABLET, FILM COATED ORAL at 21:12

## 2023-10-19 RX ADMIN — ACETAMINOPHEN 1000 MG: 500 TABLET ORAL at 04:04

## 2023-10-19 RX ADMIN — ACETAMINOPHEN 1000 MG: 500 TABLET ORAL at 23:43

## 2023-10-19 ASSESSMENT — PAIN - FUNCTIONAL ASSESSMENT
PAIN_FUNCTIONAL_ASSESSMENT: ACTIVITIES ARE NOT PREVENTED
PAIN_FUNCTIONAL_ASSESSMENT: ACTIVITIES ARE NOT PREVENTED

## 2023-10-19 ASSESSMENT — PAIN DESCRIPTION - LOCATION
LOCATION: ABDOMEN;INCISION
LOCATION: ABDOMEN
LOCATION: ABDOMEN

## 2023-10-19 ASSESSMENT — PAIN SCALES - GENERAL
PAINLEVEL_OUTOF10: 7
PAINLEVEL_OUTOF10: 6
PAINLEVEL_OUTOF10: 8
PAINLEVEL_OUTOF10: 6

## 2023-10-19 ASSESSMENT — PAIN DESCRIPTION - DESCRIPTORS
DESCRIPTORS: CRAMPING
DESCRIPTORS: CRAMPING

## 2023-10-19 ASSESSMENT — PAIN DESCRIPTION - ORIENTATION: ORIENTATION: MID;LOWER

## 2023-10-19 NOTE — LACTATION NOTE
Pt worried baby is not getting enough, currently attempting to feed, baby upset. Has just nursed 10 minutes. Reviewed signs of milk transfer, cluster feeding, hand expression, frequent skin to skin and attempts. Reassured and offered assistance as needed.

## 2023-10-19 NOTE — LACTATION NOTE
In to assist with feed, baby nursed a few minutes with a couple of audible swallows. Reviewed to stimulate with hand expression.

## 2023-10-20 LAB — SURGICAL PATHOLOGY REPORT: NORMAL

## 2023-10-20 PROCEDURE — 1220000000 HC SEMI PRIVATE OB R&B

## 2023-10-20 PROCEDURE — 6370000000 HC RX 637 (ALT 250 FOR IP): Performed by: STUDENT IN AN ORGANIZED HEALTH CARE EDUCATION/TRAINING PROGRAM

## 2023-10-20 PROCEDURE — 6360000002 HC RX W HCPCS

## 2023-10-20 PROCEDURE — 6360000002 HC RX W HCPCS: Performed by: STUDENT IN AN ORGANIZED HEALTH CARE EDUCATION/TRAINING PROGRAM

## 2023-10-20 PROCEDURE — 6370000000 HC RX 637 (ALT 250 FOR IP)

## 2023-10-20 RX ORDER — HYDROCORTISONE 25 MG/G
CREAM TOPICAL 2 TIMES DAILY PRN
Status: DISCONTINUED | OUTPATIENT
Start: 2023-10-20 | End: 2023-10-21 | Stop reason: HOSPADM

## 2023-10-20 RX ORDER — FUROSEMIDE 10 MG/ML
20 INJECTION INTRAMUSCULAR; INTRAVENOUS ONCE
Status: COMPLETED | OUTPATIENT
Start: 2023-10-20 | End: 2023-10-20

## 2023-10-20 RX ORDER — PANTOPRAZOLE SODIUM 40 MG/1
40 TABLET, DELAYED RELEASE ORAL
Status: DISCONTINUED | OUTPATIENT
Start: 2023-10-20 | End: 2023-10-21 | Stop reason: HOSPADM

## 2023-10-20 RX ADMIN — METOPROLOL TARTRATE 100 MG: 50 TABLET, FILM COATED ORAL at 09:31

## 2023-10-20 RX ADMIN — PANTOPRAZOLE SODIUM 40 MG: 40 TABLET, DELAYED RELEASE ORAL at 23:51

## 2023-10-20 RX ADMIN — FLUTICASONE PROPIONATE 1 SPRAY: 50 SPRAY, METERED NASAL at 09:33

## 2023-10-20 RX ADMIN — CYCLOBENZAPRINE 10 MG: 10 TABLET, FILM COATED ORAL at 19:21

## 2023-10-20 RX ADMIN — POLYETHYLENE GLYCOL 3350 17 G: 17 POWDER, FOR SOLUTION ORAL at 09:39

## 2023-10-20 RX ADMIN — CEPHALEXIN 500 MG: 500 CAPSULE ORAL at 05:30

## 2023-10-20 RX ADMIN — ENOXAPARIN SODIUM 40 MG: 100 INJECTION SUBCUTANEOUS at 09:33

## 2023-10-20 RX ADMIN — FLUTICASONE PROPIONATE 1 SPRAY: 50 SPRAY, METERED NASAL at 22:19

## 2023-10-20 RX ADMIN — IBUPROFEN 600 MG: 600 TABLET, FILM COATED ORAL at 19:21

## 2023-10-20 RX ADMIN — PANTOPRAZOLE SODIUM 40 MG: 40 TABLET, DELAYED RELEASE ORAL at 09:31

## 2023-10-20 RX ADMIN — HYDROCORTISONE: 25 CREAM TOPICAL at 13:44

## 2023-10-20 RX ADMIN — DOCUSATE SODIUM 50 MG AND SENNOSIDES 8.6 MG 1 TABLET: 8.6; 5 TABLET, FILM COATED ORAL at 09:31

## 2023-10-20 RX ADMIN — CETIRIZINE HYDROCHLORIDE 10 MG: 10 TABLET ORAL at 09:31

## 2023-10-20 RX ADMIN — DOCUSATE SODIUM 50 MG AND SENNOSIDES 8.6 MG 1 TABLET: 8.6; 5 TABLET, FILM COATED ORAL at 22:13

## 2023-10-20 RX ADMIN — CEPHALEXIN 500 MG: 500 CAPSULE ORAL at 13:43

## 2023-10-20 RX ADMIN — IBUPROFEN 600 MG: 600 TABLET, FILM COATED ORAL at 05:29

## 2023-10-20 RX ADMIN — OXYCODONE HYDROCHLORIDE 10 MG: 5 TABLET ORAL at 01:06

## 2023-10-20 RX ADMIN — OXYCODONE HYDROCHLORIDE 10 MG: 5 TABLET ORAL at 13:43

## 2023-10-20 RX ADMIN — IBUPROFEN 600 MG: 600 TABLET, FILM COATED ORAL at 11:28

## 2023-10-20 RX ADMIN — ACETAMINOPHEN 1000 MG: 500 TABLET ORAL at 05:29

## 2023-10-20 RX ADMIN — Medication 1 TABLET: at 09:31

## 2023-10-20 RX ADMIN — OXYCODONE HYDROCHLORIDE 10 MG: 5 TABLET ORAL at 05:29

## 2023-10-20 RX ADMIN — ACETAMINOPHEN 1000 MG: 500 TABLET ORAL at 17:42

## 2023-10-20 RX ADMIN — ACETAMINOPHEN 1000 MG: 500 TABLET ORAL at 11:27

## 2023-10-20 RX ADMIN — FUROSEMIDE 20 MG: 10 INJECTION, SOLUTION INTRAMUSCULAR; INTRAVENOUS at 05:29

## 2023-10-20 RX ADMIN — OXYCODONE HYDROCHLORIDE 10 MG: 5 TABLET ORAL at 09:32

## 2023-10-20 RX ADMIN — ENOXAPARIN SODIUM 40 MG: 100 INJECTION SUBCUTANEOUS at 22:13

## 2023-10-20 RX ADMIN — METRONIDAZOLE 500 MG: 500 TABLET, FILM COATED ORAL at 13:43

## 2023-10-20 RX ADMIN — METOPROLOL TARTRATE 100 MG: 50 TABLET, FILM COATED ORAL at 22:13

## 2023-10-20 RX ADMIN — OXYCODONE HYDROCHLORIDE 10 MG: 5 TABLET ORAL at 22:13

## 2023-10-20 RX ADMIN — OXYCODONE HYDROCHLORIDE 10 MG: 5 TABLET ORAL at 17:41

## 2023-10-20 RX ADMIN — MAGNESIUM HYDROXIDE 30 ML: 400 SUSPENSION ORAL at 02:26

## 2023-10-20 RX ADMIN — BISACODYL 10 MG: 10 SUPPOSITORY RECTAL at 02:26

## 2023-10-20 RX ADMIN — METRONIDAZOLE 500 MG: 500 TABLET, FILM COATED ORAL at 05:30

## 2023-10-20 RX ADMIN — ACETAMINOPHEN 1000 MG: 500 TABLET ORAL at 23:51

## 2023-10-20 ASSESSMENT — PAIN SCALES - GENERAL
PAINLEVEL_OUTOF10: 7
PAINLEVEL_OUTOF10: 5
PAINLEVEL_OUTOF10: 8
PAINLEVEL_OUTOF10: 7
PAINLEVEL_OUTOF10: 6
PAINLEVEL_OUTOF10: 5
PAINLEVEL_OUTOF10: 7
PAINLEVEL_OUTOF10: 8
PAINLEVEL_OUTOF10: 6

## 2023-10-20 ASSESSMENT — PAIN DESCRIPTION - DESCRIPTORS
DESCRIPTORS: ACHING;CRAMPING;DISCOMFORT
DESCRIPTORS: CRAMPING;SORE;SHARP
DESCRIPTORS: ACHING;CRAMPING;DISCOMFORT
DESCRIPTORS: ACHING;CRAMPING;DISCOMFORT
DESCRIPTORS: CRAMPING;DISCOMFORT;ACHING;PRESSURE
DESCRIPTORS: ACHING;CRAMPING;DISCOMFORT

## 2023-10-20 ASSESSMENT — PAIN DESCRIPTION - LOCATION
LOCATION: ABDOMEN
LOCATION: ABDOMEN;INCISION
LOCATION: ABDOMEN;INCISION
LOCATION: ABDOMEN;BACK
LOCATION: ABDOMEN
LOCATION: ABDOMEN
LOCATION: ABDOMEN;RECTUM

## 2023-10-20 ASSESSMENT — PAIN DESCRIPTION - ORIENTATION: ORIENTATION: MID;LOWER

## 2023-10-20 NOTE — LACTATION NOTE
Pt states breastfeeding has been going very well since last night and she is feeling more comfortable and reassured. Pt states she is hearing swallows and latch is comfortable, feeling gentle tugs. Observed latch, lips flanged out well,  is sustaining latch. Taught breast compressions. Encouraged pt to call out for assistance as needed.  Pt states she is staying until tomorrow to continue to get help with feeds at breast.

## 2023-10-20 NOTE — FLOWSHEET NOTE
Metoprolol 100mg ordered. 50mg given per patient request and nurse judgement d/t lower than normal pressures earlier in the day. Dr. Maria Elena Coppola notified. Will recheck BP and re-evaluate 4 hours after dose was given.

## 2023-10-21 VITALS
RESPIRATION RATE: 18 BRPM | TEMPERATURE: 98.1 F | HEART RATE: 86 BPM | DIASTOLIC BLOOD PRESSURE: 80 MMHG | OXYGEN SATURATION: 97 % | SYSTOLIC BLOOD PRESSURE: 123 MMHG

## 2023-10-21 PROCEDURE — 6370000000 HC RX 637 (ALT 250 FOR IP): Performed by: STUDENT IN AN ORGANIZED HEALTH CARE EDUCATION/TRAINING PROGRAM

## 2023-10-21 PROCEDURE — 6370000000 HC RX 637 (ALT 250 FOR IP)

## 2023-10-21 PROCEDURE — 6360000002 HC RX W HCPCS: Performed by: STUDENT IN AN ORGANIZED HEALTH CARE EDUCATION/TRAINING PROGRAM

## 2023-10-21 RX ADMIN — IBUPROFEN 600 MG: 600 TABLET, FILM COATED ORAL at 03:43

## 2023-10-21 RX ADMIN — FLUTICASONE PROPIONATE 1 SPRAY: 50 SPRAY, METERED NASAL at 09:46

## 2023-10-21 RX ADMIN — ACETAMINOPHEN 1000 MG: 500 TABLET ORAL at 06:50

## 2023-10-21 RX ADMIN — PANTOPRAZOLE SODIUM 40 MG: 40 TABLET, DELAYED RELEASE ORAL at 09:42

## 2023-10-21 RX ADMIN — CYCLOBENZAPRINE 10 MG: 10 TABLET, FILM COATED ORAL at 09:43

## 2023-10-21 RX ADMIN — ENOXAPARIN SODIUM 40 MG: 100 INJECTION SUBCUTANEOUS at 10:05

## 2023-10-21 RX ADMIN — CETIRIZINE HYDROCHLORIDE 10 MG: 10 TABLET ORAL at 09:42

## 2023-10-21 RX ADMIN — OXYCODONE HYDROCHLORIDE 10 MG: 5 TABLET ORAL at 03:43

## 2023-10-21 RX ADMIN — OXYCODONE HYDROCHLORIDE 10 MG: 5 TABLET ORAL at 08:31

## 2023-10-21 RX ADMIN — METOPROLOL TARTRATE 100 MG: 50 TABLET, FILM COATED ORAL at 09:42

## 2023-10-21 RX ADMIN — DOCUSATE SODIUM 50 MG AND SENNOSIDES 8.6 MG 1 TABLET: 8.6; 5 TABLET, FILM COATED ORAL at 09:46

## 2023-10-21 RX ADMIN — IBUPROFEN 600 MG: 600 TABLET, FILM COATED ORAL at 09:43

## 2023-10-21 RX ADMIN — Medication 1 TABLET: at 09:43

## 2023-10-21 ASSESSMENT — PAIN SCALES - GENERAL
PAINLEVEL_OUTOF10: 6
PAINLEVEL_OUTOF10: 7
PAINLEVEL_OUTOF10: 5
PAINLEVEL_OUTOF10: 5

## 2023-10-21 ASSESSMENT — PAIN DESCRIPTION - LOCATION
LOCATION: ABDOMEN
LOCATION: ABDOMEN

## 2023-10-21 ASSESSMENT — PAIN DESCRIPTION - DESCRIPTORS
DESCRIPTORS: ACHING;CRAMPING;DISCOMFORT
DESCRIPTORS: DISCOMFORT;ACHING;CRAMPING

## 2023-10-21 NOTE — PROGRESS NOTES
CLINICAL PHARMACY NOTE: MEDS TO BEDS    Total # of Prescriptions Filled: 5   The following medications were delivered to the patient:  Oxycodone 5mg  Senexon-S  Docusate 100mg  APAP 500mg  Ferosul 325mg    Additional Documentation:  [facility]

## 2023-10-21 NOTE — FLOWSHEET NOTE
Initiation of Electric Breast Pumping     Pumping Initiated at 3293    Initiated due to    []   Baby in NICU   []   Plans exclusive pumping   [x]   Infant weight loss(supplement)   []   Baby not latching well    Flange Size    Right:   Left:     [x]   24    [x]   24     []   27    []   27     []   30    []   30     []   36    []   36  Instructions   [x]   Verbal instructions on how to setup pump and how to use initiation phase   []   Written sheet\" How to keep your breast pump kit clean\"   []   Expectation sheet for Breastfeeding mothers with pumping log   [x]   Frequency of pumping   [x]   Collection,labeling and storage of colostrum and milk    Supplies Provided   [x]   Pump initiation kit   [x]   Cleaning supplies (basin and soap)   [x]   Additional flange size   [x]   Oral syringes/snappies   []   Patient labels       -

## 2023-10-21 NOTE — DISCHARGE INSTRUCTIONS
Follow-up with your OB doctor on 10/24/23 or as specified by your physician. Please refer to A New Beginning-Your Personal Guide to Postpartum Care book provided in your room. Please take this book home with you to refer to. For Breastfeeding moms, you can contact our lactation specialist,  with any problems or questions you may have. Phone number [phone]. Feel free to leave voice mail and your call will be returned as soon as possible. DIET  Eat a well balanced diet focusing on foods high in fiber and protein. Drink 8-10 glasses of fluids daily, especially water. To avoid constipation you may take a mild stool softener as recommended by your doctor or midwife. If taking narcotics, they may constipate you. ACTIVITY  Gradually increase your activity. Resume exercise regimen only after advise by your doctor or midwife. Avoid lifting anything heavier than your baby or a gallon of milk for SIX weeks. Avoid driving 1 week for vaginal delivery and 2 weeks for  section, unless otherwise instructed by physician or if taking any pain medications. Rise slowly from a lying to sitting and then a standing position. Climb stairs carefully. Use caution when carrying your baby up and down the stairs. NO SEXUAL Activity for 6 weeks or until advised by your doctor; Nothing in vagina: intercourse, tampons, or douching. No swimming or hot tubs. Be prepared to discuss family planning at your follow-up OB visit. You may feel tired or have a lack of energy. You may continue your prenatal vitamin to replenish nutrients post delivery. Nap when baby naps to catch up on sleep. EMOTIONS  You may feel da silva, sad, teary, & overwhelmed for the first 2 weeks postpartum. Contact your OB provider if you feel you may be showing signs of postpartum depression, or have thoughts of harming yourself or your infant.   If infant will not stop crying, contact another adult for help or place infant in their

## 2023-10-21 NOTE — FLOWSHEET NOTE
I have reviewed all AWHONN Post-Birth Warning Signs and essential teaching points for pulmonary embolism, cardiac disease, hypertensive disorders of pregnancy, obstetric hemorrhage, venous thromboembolism, infection, and postpartum depression with the patient and significant other (support person) . I have informed the patient on when to call their healthcare provider and when to call 911. I have discussed with the patient  the importance of scheduling a follow-up visit with their physician, nurse practitioner or midwife and provided them with correct contact information for appointment. I have provided the patient with a copy of the \"Save Your Life\" handout. The patient has acknowledged receiving and understanding this education with her signature.

## 2023-10-21 NOTE — LACTATION NOTE
Observed feed, mom with good positioning and technique. Baby nursing well with audible swallows. Mom initiated pumping to supplement baby, weight loss at 9% and mom hoping to avoid formula use. Has pediatric follow up in two days and  follow up scheduled for Tuesday 10-24-23. discharge instructions reviewed.

## 2023-10-24 ENCOUNTER — POSTPARTUM VISIT (OUTPATIENT)
Dept: OBGYN | Age: 33
End: 2023-10-24

## 2023-10-24 VITALS
BODY MASS INDEX: 52 KG/M2 | HEART RATE: 81 BPM | DIASTOLIC BLOOD PRESSURE: 77 MMHG | SYSTOLIC BLOOD PRESSURE: 138 MMHG | WEIGHT: 293 LBS

## 2023-10-24 DIAGNOSIS — O99.113: ICD-10-CM

## 2023-10-24 DIAGNOSIS — G89.18 POST-OP PAIN: Primary | ICD-10-CM

## 2023-10-24 DIAGNOSIS — B00.9 HSV-2 INFECTION COMPLICATING PREGNANCY, THIRD TRIMESTER: ICD-10-CM

## 2023-10-24 DIAGNOSIS — E28.2 POLYCYSTIC OVARIES: ICD-10-CM

## 2023-10-24 DIAGNOSIS — D69.1: ICD-10-CM

## 2023-10-24 DIAGNOSIS — O98.513 HSV-2 INFECTION COMPLICATING PREGNANCY, THIRD TRIMESTER: ICD-10-CM

## 2023-10-24 DIAGNOSIS — O34.219 HISTORY OF CESAREAN DELIVERY, CURRENTLY PREGNANT: ICD-10-CM

## 2023-10-24 PROCEDURE — 99024 POSTOP FOLLOW-UP VISIT: CPT

## 2023-10-24 RX ORDER — IBUPROFEN 600 MG/1
600 TABLET ORAL EVERY 6 HOURS PRN
Qty: 30 TABLET | Refills: 1 | Status: SHIPPED | OUTPATIENT
Start: 2023-10-24 | End: 2023-11-23

## 2023-10-24 RX ORDER — LIDOCAINE 50 MG/G
1 PATCH TOPICAL DAILY
Qty: 10 PATCH | Refills: 0 | Status: SHIPPED | OUTPATIENT
Start: 2023-10-24 | End: 2023-11-03

## 2023-10-24 RX ORDER — METHOCARBAMOL 750 MG/1
750 TABLET, FILM COATED ORAL 4 TIMES DAILY
Qty: 40 TABLET | Refills: 0 | Status: SHIPPED | OUTPATIENT
Start: 2023-10-24 | End: 2023-11-03

## 2023-10-24 NOTE — PROGRESS NOTES
Postpartum Visit    Luis Armando Browne is a 29 y.o. female , 1 week Post Partum s/p  repeat  section, low transverse incision on 10/18/23    The patient was seen. She complains of soreness since discharge to the hospital. She has only been taking Tylenol and Roxicodone for pain. She states she does not take motrin due to delta storage pool disease. She denies any fevers/chills or foul smelling lochia. She is  breast feeding and denies signs or symptoms of mastitis. The patient completed the E.P.D.S. Post Partum Depression Evaluation form and EPDS Score of 0. She does not have signs or symptoms of post partum depression. She denies any suicidal thoughts with a plan, intent to harm others, and delusional ideas. She does admit to having good home support. Today her lochia is light to moderate. She denies any dizziness or shortness of breath. Her bowels are regular and she denies any urinary tract symptomology. She is using no method for history of dysmenorrhea and condoms for STD prevention. Her pregnancy was complicated by:  Patient Active Problem List    Diagnosis Date Noted    Severe episode of recurrent major depressive disorder, without psychotic features ([address]) 2023     Priority: Medium     Overview Note:     Stopped her medication when she found out she was pregnant. Does not remember what she was taking. Asymptomatic now.        Attention deficit hyperactivity disorder (ADHD), predominantly inattentive type 2022     Priority: Medium    Gastro-esophageal reflux disease without esophagitis 2022     Priority: Medium    RLTCS 10/18/23 M Apg 8/9 Wt 6#2  10/18/2023    High-risk pregnancy 10/17/2023    38 weeks gestation of pregnancy 10/17/2023    Desires  (vaginal birth after ) trial 10/11/2023    cHTN (on meds)  10/11/2023     Overview Note:     Diagnosis established prior to transfer of care   Diagnosis confirmed at initial MFM apt on 10/11/23  Metoprolol daily

## 2023-10-25 NOTE — PROGRESS NOTES
Attending Physician Statement  I have discussed the care of [facility], including pertinent history and exam findings,  with the resident. I have reviewed the key elements of all parts of the encounter with the resident. I agree with the assessment, plan and orders as documented by the resident.   (GE Modifier)    Electronically signed by Beatrice Conway MD  10/25/2023  10:37 AM

## 2023-11-08 LAB
ALBUMIN/GLOBULIN RATIO: 1.6 G/DL
ALBUMIN: 4 G/DL (ref 3.5–5)
ALP BLD-CCNC: 86 UNITS/L (ref 38–126)
ALT SERPL-CCNC: 72 UNITS/L (ref 4–35)
ANION GAP SERPL CALCULATED.3IONS-SCNC: 8.1 MMOL/L (ref 12–16)
AST SERPL-CCNC: 54 UNITS/L (ref 14–36)
BASOPHILS %: 1.07 (ref 0–3)
BASOPHILS ABSOLUTE: 0.06 (ref 0–0.3)
BILIRUB SERPL-MCNC: 0.5 MG/DL (ref 0.2–1.3)
BUN BLDV-MCNC: 10 MG/DL (ref 7–17)
C-REACTIVE PROTEIN: 0.7 MG/DL (ref 0–1)
CALCIUM SERPL-MCNC: 8.9 MG/DL (ref 8.4–10.2)
CHLORIDE BLD-SCNC: 106 MMOL/L (ref 98–120)
CO2: 25 MMOL/L (ref 22–31)
CREAT SERPL-MCNC: 0.7 MG/DL (ref 0.5–1)
EOSINOPHILS %: 4.2 (ref 0–10)
EOSINOPHILS ABSOLUTE: 0.24 (ref 0–1.1)
GFR CALCULATED: > 60
GLOBULIN: 2.5 G/DL
GLUCOSE: 92 MG/DL (ref 65–105)
HBA1C MFR BLD: 5 % (ref 4.4–6.4)
HCT VFR BLD CALC: 38 % (ref 37–47)
HEMOGLOBIN: 12.2 (ref 12–16)
LYMPHOCYTE %: 44.61 (ref 20–51.1)
LYMPHOCYTES ABSOLUTE: 2.58 (ref 1–5.5)
MCH RBC QN AUTO: 29.1 PG (ref 28.5–32.5)
MCHC RBC AUTO-ENTMCNC: 32 G/DL (ref 32–37)
MCV RBC AUTO: 91 FL (ref 80–94)
MONOCYTES %: 8.59 (ref 1.7–9.3)
MONOCYTES ABSOLUTE: 0.5 (ref 0.1–1)
NEUTROPHILS %: 41.53 (ref 42.2–75.2)
NEUTROPHILS ABSOLUTE: 2.4 (ref 2–8.1)
PDW BLD-RTO: 13.7 % (ref 8.5–15.5)
PLATELET # BLD: 385.8 THOU/MM3 (ref 130–400)
POTASSIUM SERPL-SCNC: 4.6 MMOL/L (ref 3.6–5)
RBC: 4.18 M/UL (ref 4.2–5.4)
SEDIMENTATION RATE, ERYTHROCYTE: 13 MM/HR (ref 0–20)
SODIUM BLD-SCNC: 139 MMOL/L (ref 135–145)
TOTAL PROTEIN, SERUM: 6.6 G/DL (ref 6.3–8.2)
URIC ACID: 5.6 MG/DL (ref 3.5–8.5)
WBC: 5.8 THOU/ML3 (ref 4.8–10.8)

## 2023-11-10 ENCOUNTER — OFFICE VISIT (OUTPATIENT)
Dept: FAMILY MEDICINE CLINIC | Age: 33
End: 2023-11-10
Payer: COMMERCIAL

## 2023-11-10 VITALS
BODY MASS INDEX: 45.99 KG/M2 | WEIGHT: 293 LBS | DIASTOLIC BLOOD PRESSURE: 86 MMHG | OXYGEN SATURATION: 97 % | HEART RATE: 88 BPM | SYSTOLIC BLOOD PRESSURE: 122 MMHG | HEIGHT: 67 IN

## 2023-11-10 DIAGNOSIS — H60.311 ACUTE DIFFUSE OTITIS EXTERNA OF RIGHT EAR: Primary | ICD-10-CM

## 2023-11-10 PROCEDURE — G8484 FLU IMMUNIZE NO ADMIN: HCPCS | Performed by: FAMILY MEDICINE

## 2023-11-10 PROCEDURE — 99213 OFFICE O/P EST LOW 20 MIN: CPT | Performed by: FAMILY MEDICINE

## 2023-11-10 PROCEDURE — G8417 CALC BMI ABV UP PARAM F/U: HCPCS | Performed by: FAMILY MEDICINE

## 2023-11-10 PROCEDURE — 1111F DSCHRG MED/CURRENT MED MERGE: CPT | Performed by: FAMILY MEDICINE

## 2023-11-10 PROCEDURE — 3079F DIAST BP 80-89 MM HG: CPT | Performed by: FAMILY MEDICINE

## 2023-11-10 PROCEDURE — 3074F SYST BP LT 130 MM HG: CPT | Performed by: FAMILY MEDICINE

## 2023-11-10 PROCEDURE — G8428 CUR MEDS NOT DOCUMENT: HCPCS | Performed by: FAMILY MEDICINE

## 2023-11-10 PROCEDURE — 4130F TOPICAL PREP RX AOE: CPT | Performed by: FAMILY MEDICINE

## 2023-11-10 PROCEDURE — 1036F TOBACCO NON-USER: CPT | Performed by: FAMILY MEDICINE
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
## 2024-05-06 ENCOUNTER — TELEPHONE (OUTPATIENT)
Dept: FAMILY MEDICINE CLINIC | Age: 34
End: 2024-05-06

## 2024-05-07 NOTE — TELEPHONE ENCOUNTER
Care Transitions Initial Follow Up Call    Outreach made within 2 business days of discharge: Yes    Patient: Deanna Sutton Patient : 1990   MRN: [id number]  Reason for Admission: There are no discharge diagnoses documented for the most recent discharge.  Discharge Date: 10/21/23       Spoke with: Deanna    Discharge department/facility: [facility]    TCM Interactive Patient Contact:  Was patient able to fill all prescriptions: Yes  Was patient instructed to bring all medications to the follow-up visit: Yes  Is patient taking all medications as directed in the discharge summary? Yes  Does patient understand their discharge instructions: Yes  Does patient have questions or concerns that need addressed prior to 7-14 day follow up office visit: no    Scheduled appointment with PCP within 7-14 days    Follow Up  Future Appointments   Date Time Provider Department Center   2024  8:20 AM Lina Delacruz APRN - CNP [facility]       Natalee Banda MA

## 2024-05-09 ENCOUNTER — OFFICE VISIT (OUTPATIENT)
Dept: FAMILY MEDICINE CLINIC | Age: 34
End: 2024-05-09
Payer: COMMERCIAL

## 2024-05-09 VITALS — OXYGEN SATURATION: 96 % | DIASTOLIC BLOOD PRESSURE: 84 MMHG | HEART RATE: 101 BPM | SYSTOLIC BLOOD PRESSURE: 124 MMHG

## 2024-05-09 DIAGNOSIS — Z09 HOSPITAL DISCHARGE FOLLOW-UP: Primary | ICD-10-CM

## 2024-05-09 DIAGNOSIS — H70.92 MASTOIDITIS, LEFT: ICD-10-CM

## 2024-05-09 DIAGNOSIS — F33.2 SEVERE EPISODE OF RECURRENT MAJOR DEPRESSIVE DISORDER, WITHOUT PSYCHOTIC FEATURES (HCC): ICD-10-CM

## 2024-05-09 DIAGNOSIS — H60.392 OTHER INFECTIVE ACUTE OTITIS EXTERNA OF LEFT EAR: ICD-10-CM

## 2024-05-09 DIAGNOSIS — E55.9 HYPOVITAMINOSIS D: ICD-10-CM

## 2024-05-09 DIAGNOSIS — K21.9 GASTRO-ESOPHAGEAL REFLUX DISEASE WITHOUT ESOPHAGITIS: ICD-10-CM

## 2024-05-09 LAB — VITAMIN D 25-HYDROXY: 56.9 NG/ML (ref 30–100)

## 2024-05-09 PROCEDURE — 99212 OFFICE O/P EST SF 10 MIN: CPT | Performed by: NURSE PRACTITIONER

## 2024-05-09 PROCEDURE — 99215 OFFICE O/P EST HI 40 MIN: CPT | Performed by: NURSE PRACTITIONER

## 2024-05-09 RX ORDER — OXYCODONE HYDROCHLORIDE AND ACETAMINOPHEN 5; 325 MG/1; MG/1
1 TABLET ORAL EVERY 4 HOURS PRN
COMMUNITY

## 2024-05-09 RX ORDER — DOXYCYCLINE HYCLATE 100 MG/1
100 CAPSULE ORAL 2 TIMES DAILY
COMMUNITY
End: 2024-05-22 | Stop reason: ALTCHOICE

## 2024-05-09 RX ORDER — LEVOFLOXACIN 750 MG/1
750 TABLET, FILM COATED ORAL DAILY
COMMUNITY
End: 2024-05-22 | Stop reason: ALTCHOICE

## 2024-05-09 RX ORDER — CHOLECALCIFEROL (VITAMIN D3) 1250 MCG
1 CAPSULE ORAL DAILY
Status: CANCELLED | OUTPATIENT
Start: 2024-05-09

## 2024-05-09 RX ORDER — CIPROFLOXACIN AND DEXAMETHASONE 3; 1 MG/ML; MG/ML
4 SUSPENSION/ DROPS AURICULAR (OTIC) 2 TIMES DAILY
COMMUNITY

## 2024-05-09 RX ORDER — FLUOCINOLONE ACETONIDE 0.11 MG/ML
5 OIL AURICULAR (OTIC) 2 TIMES DAILY
COMMUNITY

## 2024-05-09 RX ORDER — PANTOPRAZOLE SODIUM 40 MG/1
40 TABLET, DELAYED RELEASE ORAL 2 TIMES DAILY
Qty: 60 TABLET | Refills: 5 | Status: SHIPPED | OUTPATIENT
Start: 2024-05-09

## 2024-05-09 SDOH — ECONOMIC STABILITY: FOOD INSECURITY: WITHIN THE PAST 12 MONTHS, THE FOOD YOU BOUGHT JUST DIDN'T LAST AND YOU DIDN'T HAVE MONEY TO GET MORE.: NEVER TRUE

## 2024-05-09 SDOH — ECONOMIC STABILITY: HOUSING INSECURITY
IN THE LAST 12 MONTHS, WAS THERE A TIME WHEN YOU DID NOT HAVE A STEADY PLACE TO SLEEP OR SLEPT IN A SHELTER (INCLUDING NOW)?: NO

## 2024-05-09 SDOH — ECONOMIC STABILITY: FOOD INSECURITY: WITHIN THE PAST 12 MONTHS, YOU WORRIED THAT YOUR FOOD WOULD RUN OUT BEFORE YOU GOT MONEY TO BUY MORE.: NEVER TRUE

## 2024-05-09 SDOH — ECONOMIC STABILITY: INCOME INSECURITY: HOW HARD IS IT FOR YOU TO PAY FOR THE VERY BASICS LIKE FOOD, HOUSING, MEDICAL CARE, AND HEATING?: NOT HARD AT ALL

## 2024-05-09 ASSESSMENT — PATIENT HEALTH QUESTIONNAIRE - PHQ9
7. TROUBLE CONCENTRATING ON THINGS, SUCH AS READING THE NEWSPAPER OR WATCHING TELEVISION: NOT AT ALL
3. TROUBLE FALLING OR STAYING ASLEEP: NOT AT ALL
9. THOUGHTS THAT YOU WOULD BE BETTER OFF DEAD, OR OF HURTING YOURSELF: NOT AT ALL
8. MOVING OR SPEAKING SO SLOWLY THAT OTHER PEOPLE COULD HAVE NOTICED. OR THE OPPOSITE, BEING SO FIGETY OR RESTLESS THAT YOU HAVE BEEN MOVING AROUND A LOT MORE THAN USUAL: NOT AT ALL
SUM OF ALL RESPONSES TO PHQ QUESTIONS 1-9: 0
5. POOR APPETITE OR OVEREATING: NOT AT ALL
1. LITTLE INTEREST OR PLEASURE IN DOING THINGS: NOT AT ALL
SUM OF ALL RESPONSES TO PHQ9 QUESTIONS 1 & 2: 0
4. FEELING TIRED OR HAVING LITTLE ENERGY: NOT AT ALL
6. FEELING BAD ABOUT YOURSELF - OR THAT YOU ARE A FAILURE OR HAVE LET YOURSELF OR YOUR FAMILY DOWN: NOT AT ALL
SUM OF ALL RESPONSES TO PHQ QUESTIONS 1-9: 0
2. FEELING DOWN, DEPRESSED OR HOPELESS: NOT AT ALL
10. IF YOU CHECKED OFF ANY PROBLEMS, HOW DIFFICULT HAVE THESE PROBLEMS MADE IT FOR YOU TO DO YOUR WORK, TAKE CARE OF THINGS AT HOME, OR GET ALONG WITH OTHER PEOPLE: NOT DIFFICULT AT ALL

## 2024-05-09 NOTE — PROGRESS NOTES
Post-Discharge Transitional Care Follow Up    Deanna Sutton   YOB: 1990    Date of Office Visit:  5/9/2024  Date of Hospital Admission: 5/1/2024  Date of Hospital Discharge: 5/6/2024    Care management risk score Rising risk (score 2-5) and Complex Care (Scores >=6): No Risk Score On File     Non face to face  following discharge, date last encounter closed (first attempt may have been earlier): 05/06/2024     Call initiated 2 business days of discharge: Yes    ASSESSMENT/PLAN:   Hospital discharge follow-up  Mastoiditis, left  Comments:  Improving, continue antibiotic. Will need follow up with ENT. Patient will schedule the appointment.  Other infective acute otitis externa of left ear  Comments:  Improving, continue anticiotic.  Hypovitaminosis D  -     Vitamin D 25 Hydroxy  Severe episode of recurrent major depressive disorder, without psychotic features (HCC)  Assessment & Plan:   Well-controlled, continue current medications  Gastro-esophageal reflux disease without esophagitis  -     pantoprazole (PROTONIX) 40 MG tablet; Take 1 tablet by mouth 2 times daily, Disp-60 tablet, R-5Normal      Medical Decision Making: moderate complexity  Return in about 1 week (around 5/16/2024).           Subjective:   HPI  Follow up after discharge from [facility] for mastoiditis. Given IV antibiotics, discharged with doxycycline, levofloxacin, cipro drops. Also prescribed oil drops by urgent care but has not started.     Inpatient course: Discharge summary reviewed- see chart.    Interval history/Current status: Feeling batter today, getting occasional sharp pain to the left ear and jaw, otherwise pain rated 3-4/10. No fever or chills. Appetite is fair.     Patient Active Problem List   Diagnosis    Polycystic ovaries    Fibromyalgia    Gastro-esophageal reflux disease without esophagitis    Hypovitaminosis D    Severe episode of recurrent major depressive disorder, without psychotic features (HCC)    Endometriosis of

## 2024-05-10 DIAGNOSIS — E55.9 HYPOVITAMINOSIS D: Primary | ICD-10-CM

## 2024-05-10 RX ORDER — FAMOTIDINE 20 MG
1 TABLET ORAL DAILY
Qty: 90 CAPSULE | Refills: 3 | Status: SHIPPED | OUTPATIENT
Start: 2024-05-10 | End: 2025-05-05

## 2024-05-15 ENCOUNTER — OFFICE VISIT (OUTPATIENT)
Dept: FAMILY MEDICINE CLINIC | Age: 34
End: 2024-05-15
Payer: COMMERCIAL

## 2024-05-15 VITALS — SYSTOLIC BLOOD PRESSURE: 126 MMHG | DIASTOLIC BLOOD PRESSURE: 84 MMHG | HEART RATE: 95 BPM | OXYGEN SATURATION: 98 %

## 2024-05-15 DIAGNOSIS — L98.9 PSORIASIS-LIKE SKIN DISEASE: Primary | ICD-10-CM

## 2024-05-15 PROCEDURE — 99212 OFFICE O/P EST SF 10 MIN: CPT | Performed by: NURSE PRACTITIONER

## 2024-05-15 PROCEDURE — G8417 CALC BMI ABV UP PARAM F/U: HCPCS | Performed by: NURSE PRACTITIONER

## 2024-05-15 PROCEDURE — 1036F TOBACCO NON-USER: CPT | Performed by: NURSE PRACTITIONER

## 2024-05-15 PROCEDURE — 99213 OFFICE O/P EST LOW 20 MIN: CPT | Performed by: NURSE PRACTITIONER

## 2024-05-15 PROCEDURE — G8427 DOCREV CUR MEDS BY ELIG CLIN: HCPCS | Performed by: NURSE PRACTITIONER

## 2024-05-15 RX ORDER — PREDNISONE 20 MG/1
20 TABLET ORAL DAILY
Qty: 5 TABLET | Refills: 0 | Status: SHIPPED | OUTPATIENT
Start: 2024-05-15 | End: 2024-05-20

## 2024-05-15 NOTE — PROGRESS NOTES
[address], [address]  [place] [address]  Dept: [phone]  Dept Fax: [phone]    Date of Service:  5/15/2024    Chief Complaint   Patient presents with    Follow-up     One week f/u mastoiditis. Today reports ear pain is getting better. Also would lke to review results of vit d        Diagnoses / Plan:   1. Psoriasis-like skin disease  -     predniSONE (DELTASONE) 20 MG tablet; Take 1 tablet by mouth daily for 5 days, Disp-5 tablet, R-0Normal     Oral steroid to help calm the swelling and flaking to ear canal. Medication sent to the pharmacy.  Discussed medication desired effects, potential side effects, and how to take the medication.  Follow-up for worsening or persistent symptoms.  Patient verbalizes understanding regarding plan of care and all questions answered.    Return in about 1 week (around 5/22/2024).     Subjective:   History of Present Illness:  Presents for follow up of mastoiditis. She was initially admitted to [facility] for IV antibiotics, discharged with doxycycline, levofloxacin, cipro drops.     Completed antibiotics yesterday. Reports stomach is still a little upset. Hearing is a little clogged in the morning. She is scheduled with [facility] on 6/14/2024.     Current Outpatient Medications   Medication Sig Dispense Refill    metFORMIN (GLUCOPHAGE) 500 MG tablet       Vitamin D, Cholecalciferol, 25 MCG (1000 UT) CAPS Take 1,000 Units by mouth daily 90 capsule 3    fluocinolone (DERMOTIC) 0.01 % OIL oil 5 drops 2 times daily      ciprofloxacin-dexAMETHasone (CIPRODEX) 0.3-0.1 % otic suspension 4 drops 2 times daily      oxyCODONE-acetaminophen (PERCOCET) 5-325 MG per tablet Take 1 tablet by mouth every 4 hours as needed for Pain.      pantoprazole (PROTONIX) 40 MG tablet Take 1 tablet by mouth 2 times daily 60 tablet 5    magnesium oxide (MAG-OX) 400 (240 Mg) MG tablet Take 1 tablet by mouth 2 times daily      Prenat w/o Q-[id number]-FA-DHA

## 2024-05-20 PROBLEM — I10 CHRONIC HYPERTENSION: Status: RESOLVED | Noted: 2023-10-11 | Resolved: 2024-05-20

## 2024-05-20 PROBLEM — O34.219 HISTORY OF CESAREAN DELIVERY, CURRENTLY PREGNANT: Status: RESOLVED | Noted: 2023-10-04 | Resolved: 2024-05-20

## 2024-05-20 PROBLEM — D69.1: Status: RESOLVED | Noted: 2023-10-04 | Resolved: 2024-05-20

## 2024-05-20 PROBLEM — O34.219 DESIRES VBAC (VAGINAL BIRTH AFTER CESAREAN) TRIAL: Status: RESOLVED | Noted: 2023-10-11 | Resolved: 2024-05-20

## 2024-05-20 PROBLEM — O99.113: Status: RESOLVED | Noted: 2023-10-04 | Resolved: 2024-05-20

## 2024-05-20 PROBLEM — O09.90 HIGH-RISK PREGNANCY: Status: RESOLVED | Noted: 2023-10-17 | Resolved: 2024-05-20

## 2024-05-20 PROBLEM — B00.9 HSV-2 INFECTION COMPLICATING PREGNANCY, THIRD TRIMESTER: Status: RESOLVED | Noted: 2023-10-04 | Resolved: 2024-05-20

## 2024-05-20 PROBLEM — F90.0 ATTENTION DEFICIT HYPERACTIVITY DISORDER (ADHD), PREDOMINANTLY INATTENTIVE TYPE: Status: RESOLVED | Noted: 2022-11-27 | Resolved: 2024-05-20

## 2024-05-20 PROBLEM — Z3A.38 38 WEEKS GESTATION OF PREGNANCY: Status: RESOLVED | Noted: 2023-10-17 | Resolved: 2024-05-20

## 2024-05-20 PROBLEM — O98.513 HSV-2 INFECTION COMPLICATING PREGNANCY, THIRD TRIMESTER: Status: RESOLVED | Noted: 2023-10-04 | Resolved: 2024-05-20

## 2024-05-20 PROBLEM — Z98.891 S/P C-SECTION: Status: RESOLVED | Noted: 2023-10-18 | Resolved: 2024-05-20

## 2024-05-20 ASSESSMENT — ENCOUNTER SYMPTOMS: RESPIRATORY NEGATIVE: 1

## 2024-05-22 ENCOUNTER — OFFICE VISIT (OUTPATIENT)
Dept: FAMILY MEDICINE CLINIC | Age: 34
End: 2024-05-22
Payer: COMMERCIAL

## 2024-05-22 VITALS
OXYGEN SATURATION: 97 % | SYSTOLIC BLOOD PRESSURE: 124 MMHG | HEART RATE: 78 BPM | DIASTOLIC BLOOD PRESSURE: 86 MMHG | WEIGHT: 293 LBS | BODY MASS INDEX: 55.13 KG/M2

## 2024-05-22 DIAGNOSIS — L40.9 PSORIASIS: Primary | ICD-10-CM

## 2024-05-22 PROCEDURE — 1036F TOBACCO NON-USER: CPT | Performed by: NURSE PRACTITIONER

## 2024-05-22 PROCEDURE — G8427 DOCREV CUR MEDS BY ELIG CLIN: HCPCS | Performed by: NURSE PRACTITIONER

## 2024-05-22 PROCEDURE — G8417 CALC BMI ABV UP PARAM F/U: HCPCS | Performed by: NURSE PRACTITIONER

## 2024-05-22 PROCEDURE — 99213 OFFICE O/P EST LOW 20 MIN: CPT | Performed by: NURSE PRACTITIONER

## 2024-05-22 NOTE — PROGRESS NOTES
[address], [address]  [place] [id number]  Dept: [phone]  Dept Fax: [phone]    Date of Service:  5/22/2024    Deanna Sutton is a 33 y.o. female who comes in today for follow up of chronic health issues.     Chief Complaint   Patient presents with    Follow-up     Psoriasis skin issue and recheck ears        Diagnoses / Plan:   1. Psoriasis  Comments:  Bilateral ear canal  Assessment & Plan:  Gradually improving, instructed to start fluocinolone oil drops as prescribed by urgent care, 5 drops twice daily.      Establish with ENT for chronic bilateral ear pain, psoriasis to bilateral ear canal and recent mastoiditis on left.  Follow-up for worsening or persistent symptoms. Patient verbalizes understanding regarding plan of care and all questions answered.    Return if symptoms worsen or fail to improve.     Subjective:   History of Present Illness:  Patient presents for follow-up after completing oral steroid. She reports feeling a little better. Recently discharged from [facility] after treatment for mastoiditis of the left ear. Today, she complains of occasional sharp pain in the left ear, lasting 1 to 2 minutes.     BP Readings from Last 3 Encounters:   05/22/24 124/86   05/15/24 126/84   05/09/24 124/84       Pulse Readings from Last 3 Encounters:   05/22/24 78   05/15/24 95   05/09/24 (!) 101        Wt Readings from Last 3 Encounters:   05/22/24 (!) 159.7 kg (352 lb)   11/10/23 (!) 141.5 kg (312 lb)   10/24/23 (!) 150.6 kg (332 lb)        Current Outpatient Medications   Medication Sig Dispense Refill    metFORMIN (GLUCOPHAGE) 500 MG tablet       Vitamin D, Cholecalciferol, 25 MCG (1000 UT) CAPS Take 1,000 Units by mouth daily 90 capsule 3    fluocinolone (DERMOTIC) 0.01 % OIL oil 5 drops 2 times daily      ciprofloxacin-dexAMETHasone (CIPRODEX) 0.3-0.1 % otic suspension 4 drops 2 times daily      oxyCODONE-acetaminophen (PERCOCET) 5-325 MG per tablet

## 2024-06-07 PROBLEM — L98.9 PSORIASIS-LIKE SKIN DISEASE: Status: ACTIVE | Noted: 2024-06-07

## 2024-06-07 PROBLEM — L98.9 PSORIASIS-LIKE SKIN DISEASE: Status: RESOLVED | Noted: 2024-06-07 | Resolved: 2024-06-07

## 2024-06-07 PROBLEM — L40.9 PSORIASIS: Status: ACTIVE | Noted: 2024-06-07

## 2024-06-08 NOTE — ASSESSMENT & PLAN NOTE
Gradually improving, instructed to start fluocinolone oil drops as prescribed by urgent care, 5 drops twice daily.

## 2024-06-20 DIAGNOSIS — L98.9 PSORIASIS-LIKE SKIN DISEASE: Primary | ICD-10-CM

## 2024-07-01 ENCOUNTER — OFFICE VISIT (OUTPATIENT)
Dept: FAMILY MEDICINE CLINIC | Age: 34
End: 2024-07-01
Payer: COMMERCIAL

## 2024-07-01 DIAGNOSIS — N92.1 PROLONGED MENSTRUAL CYCLE: ICD-10-CM

## 2024-07-01 DIAGNOSIS — R11.0 NAUSEA: ICD-10-CM

## 2024-07-01 DIAGNOSIS — H81.12 BENIGN PAROXYSMAL POSITIONAL VERTIGO OF LEFT EAR: Primary | ICD-10-CM

## 2024-07-01 DIAGNOSIS — H61.899 DEBRIS IN EAR CANAL: ICD-10-CM

## 2024-07-01 DIAGNOSIS — R42 DIZZINESS: ICD-10-CM

## 2024-07-01 LAB — HGB, POC: 13.4

## 2024-07-01 PROCEDURE — 85018 HEMOGLOBIN: CPT | Performed by: NURSE PRACTITIONER

## 2024-07-01 PROCEDURE — 99213 OFFICE O/P EST LOW 20 MIN: CPT | Performed by: NURSE PRACTITIONER

## 2024-07-01 PROCEDURE — 1036F TOBACCO NON-USER: CPT | Performed by: NURSE PRACTITIONER

## 2024-07-01 PROCEDURE — PBSHW POCT HEMOGLOBIN: Performed by: NURSE PRACTITIONER

## 2024-07-01 PROCEDURE — G8417 CALC BMI ABV UP PARAM F/U: HCPCS | Performed by: NURSE PRACTITIONER

## 2024-07-01 PROCEDURE — G8427 DOCREV CUR MEDS BY ELIG CLIN: HCPCS | Performed by: NURSE PRACTITIONER

## 2024-07-01 RX ORDER — ONDANSETRON 4 MG/1
TABLET, FILM COATED ORAL
Qty: 20 TABLET | Refills: 0 | Status: SHIPPED | OUTPATIENT
Start: 2024-07-01

## 2024-07-01 RX ORDER — MECLIZINE HYDROCHLORIDE 25 MG/1
25 TABLET ORAL 3 TIMES DAILY PRN
Qty: 30 TABLET | Refills: 0 | Status: SHIPPED | OUTPATIENT
Start: 2024-07-01 | End: 2024-07-11

## 2024-07-01 NOTE — PROGRESS NOTES
[address], [address]  [place], [place] [address]  Dept: [phone]  Dept Fax: [phone]    Date of Service:  7/1/2024    Chief Complaint   Patient presents with    Dizziness     Dizziness since in the hospital for the ear infection, has been getting worse        Diagnoses / Plan:   1. Benign paroxysmal positional vertigo of left ear  Comments:  Start meclizine for symptom relief. Provided somersault exercise written instructions. Referral to physical therapy for further maneuvers if needed.  Orders:  -     meclizine (ANTIVERT) 25 MG tablet; Take 1 tablet by mouth 3 times daily as needed for Dizziness, Disp-30 tablet, R-0Normal  -     External Referral To Physical Therapy  2. Dizziness  -     POCT hemoglobin  -     meclizine (ANTIVERT) 25 MG tablet; Take 1 tablet by mouth 3 times daily as needed for Dizziness, Disp-30 tablet, R-0Normal  3. Nausea  Comments:  Maintain a bland diet to avoid exacerbating nausea.  Orders:  -     ondansetron (ZOFRAN) 4 MG tablet; One to two po Q 8 hours prn nausea., Disp-20 tablet, R-0Normal  4. Debris in ear canal  5. Prolonged menstrual cycle  Comments:  Hemoglobin level normal, ruling out anemia. Monitor for any further abnormal bleeding. Consider follow-up with gynecology if symptoms persist.     Results for POC orders placed in visit on 07/01/24   POCT hemoglobin   Result Value Ref Range    Hemoglobin 13.4      Advised to monitor temperature and hydrate. Patient to call if symptoms worsen.  Follow-up if any new symptoms develop or if current symptoms worsen.    Patient verbalizes understanding regarding plan of care and all questions answered.    Return if symptoms worsen or fail to improve.     Subjective:   History of Present Illness:  The patient presents for a follow-up visit due to persistent dizziness and a prolonged menstrual cycle. The dizziness began approximately one week postpartum and has progressively worsened

## 2024-07-14 VITALS
SYSTOLIC BLOOD PRESSURE: 122 MMHG | TEMPERATURE: 99.3 F | DIASTOLIC BLOOD PRESSURE: 80 MMHG | OXYGEN SATURATION: 98 % | BODY MASS INDEX: 54.5 KG/M2 | HEART RATE: 86 BPM | WEIGHT: 293 LBS

## 2024-07-14 PROBLEM — R00.0 TACHYCARDIA, UNSPECIFIED: Status: RESOLVED | Noted: 2023-09-05 | Resolved: 2024-07-14

## 2024-07-15 ENCOUNTER — OFFICE VISIT (OUTPATIENT)
Dept: FAMILY MEDICINE CLINIC | Age: 34
End: 2024-07-15
Payer: COMMERCIAL

## 2024-07-15 VITALS
DIASTOLIC BLOOD PRESSURE: 84 MMHG | WEIGHT: 293 LBS | OXYGEN SATURATION: 96 % | HEART RATE: 101 BPM | SYSTOLIC BLOOD PRESSURE: 122 MMHG | BODY MASS INDEX: 52.66 KG/M2

## 2024-07-15 DIAGNOSIS — R11.0 NAUSEA: ICD-10-CM

## 2024-07-15 DIAGNOSIS — J40 BRONCHITIS: Primary | ICD-10-CM

## 2024-07-15 DIAGNOSIS — N92.1 PROLONGED MENSTRUAL CYCLE: ICD-10-CM

## 2024-07-15 DIAGNOSIS — K21.9 GASTRO-ESOPHAGEAL REFLUX DISEASE WITHOUT ESOPHAGITIS: ICD-10-CM

## 2024-07-15 PROCEDURE — G8417 CALC BMI ABV UP PARAM F/U: HCPCS | Performed by: NURSE PRACTITIONER

## 2024-07-15 PROCEDURE — G8427 DOCREV CUR MEDS BY ELIG CLIN: HCPCS | Performed by: NURSE PRACTITIONER

## 2024-07-15 PROCEDURE — 1036F TOBACCO NON-USER: CPT | Performed by: NURSE PRACTITIONER

## 2024-07-15 PROCEDURE — 99212 OFFICE O/P EST SF 10 MIN: CPT | Performed by: NURSE PRACTITIONER

## 2024-07-15 RX ORDER — AMOXICILLIN 875 MG/1
875 TABLET, COATED ORAL 2 TIMES DAILY
COMMUNITY

## 2024-07-15 NOTE — PROGRESS NOTES
[address], [address]  [place] [id number]  Dept: [phone]  Dept Fax: [phone]    Date of Service:  7/15/2024    Chief Complaint   Patient presents with    Follow-up     Was seen at [facility] for cough, was dx with bronchitis, has 2 more days of abx, cough is better but still feeling sick to her stomach and vomiting, pt reports she is still on her period and has been more 2 months now.        Diagnoses / Plan:   1. Bronchitis  2. Nausea  3. Gastro-esophageal reflux disease without esophagitis  4. Prolonged menstrual cycle     Complete antibiotic as discussed, Zofran for nausea. Encouraged symptomatic treatment, rest, increase oral fluid intake.  Follow-up for worsening or persistent symptoms. Patient verbalizes understanding regarding plan of care and all questions answered.    Patient verbalizes understanding regarding plan of care and all questions answered.    Return if symptoms worsen or fail to improve.     Subjective:   History of Present Illness:  Follow up complaining of cough, nausea, vomiting and prolonged menstrual cycle. She was previously evaluated at urgent care and placed on an antibiotic, which she is still taking.    BP Readings from Last 3 Encounters:   07/15/24 122/84   07/01/24 122/80   05/22/24 124/86       Pulse Readings from Last 3 Encounters:   07/15/24 (!) 101   07/01/24 86   05/22/24 78        Wt Readings from Last 3 Encounters:   07/15/24 (!) 152.5 kg (336 lb 3.2 oz)   07/01/24 (!) 157.9 kg (348 lb)   05/22/24 (!) 159.7 kg (352 lb)        Current Outpatient Medications   Medication Sig Dispense Refill    amoxicillin (AMOXIL) 875 MG tablet Take 1 tablet by mouth 2 times daily      ondansetron (ZOFRAN) 4 MG tablet One to two po Q 8 hours prn nausea. 20 tablet 0    Vitamin D, Cholecalciferol, 25 MCG (1000 UT) CAPS Take 1,000 Units by mouth daily 90 capsule 3    fluocinolone (DERMOTIC) 0.01 % OIL oil 5 drops 2 times daily

## 2024-07-29 PROBLEM — N92.1 PROLONGED MENSTRUAL CYCLE: Status: ACTIVE | Noted: 2024-07-29

## 2024-08-01 DIAGNOSIS — K21.9 GASTRO-ESOPHAGEAL REFLUX DISEASE WITHOUT ESOPHAGITIS: ICD-10-CM

## 2024-08-01 RX ORDER — PANTOPRAZOLE SODIUM 40 MG/1
40 TABLET, DELAYED RELEASE ORAL 2 TIMES DAILY
Qty: 60 TABLET | Refills: 5 | Status: SHIPPED | OUTPATIENT
Start: 2024-08-01

## 2024-08-01 NOTE — TELEPHONE ENCOUNTER
Patient called in stating that [facility] is stating that they do not have her script for pantoprazole and is requesting that pcp send in a new one for her to [facility].     Medication pended if agreeable     Last Appt:  7/15/2024  Next Appt:   Visit date not found  Med verified in Epic        [facility] also states [facility] will not [facility] her medications and requested that this pharmacy be removed. Writer will remove pharmacy.

## 2024-08-21 LAB
ALBUMIN/GLOBULIN RATIO: 1.8 G/DL
ALBUMIN: 4.4 G/DL (ref 3.5–5)
ALP BLD-CCNC: 64 UNITS/L (ref 38–126)
ALT SERPL-CCNC: 65 UNITS/L (ref 4–35)
ANION GAP SERPL CALCULATED.3IONS-SCNC: 10.3 MMOL/L (ref 3–11)
AST SERPL-CCNC: 40 UNITS/L (ref 14–36)
BASOPHILS ABSOLUTE: 0.11 X10E3/?L (ref 0–0.3)
BASOPHILS RELATIVE PERCENT: 1.38 % (ref 0–3)
BILIRUB SERPL-MCNC: 0.6 MG/DL (ref 0.2–1.3)
BUN BLDV-MCNC: 12 MG/DL (ref 7–17)
CALCIUM SERPL-MCNC: 9.2 MG/DL (ref 8.4–10.2)
CHLORIDE BLD-SCNC: 107 MMOL/L (ref 98–120)
CO2: 23 MMOL/L (ref 22–31)
CREAT SERPL-MCNC: 0.8 MG/DL (ref 0.5–1)
EOSINOPHILS ABSOLUTE: 0.26 X10E3/?L (ref 0–1.1)
EOSINOPHILS RELATIVE PERCENT: 3.19 % (ref 0–10)
GFR, ESTIMATED: > 60
GLOBULIN: 2.4 G/DL
GLUCOSE: 92 MG/DL (ref 65–105)
HCT VFR BLD CALC: 41.5 % (ref 37–47)
HEMOGLOBIN: 13.6 G/DL (ref 12–16)
LYMPHOCYTES ABSOLUTE: 3.16 X10E3/?L (ref 1–5.5)
LYMPHOCYTES RELATIVE PERCENT: 39.45 % (ref 20–51.1)
MCH RBC QN AUTO: 30.1 PG (ref 28.5–32.5)
MCHC RBC AUTO-ENTMCNC: 32.7 G/DL (ref 32–37)
MCV RBC AUTO: 92.2 FL (ref 80–94)
MONOCYTES ABSOLUTE: 0.47 X10E3/?L (ref 0.1–1)
MONOCYTES RELATIVE PERCENT: 5.89 % (ref 1.7–9.3)
NEUTROPHILS ABSOLUTE: 4.01 X10E3/?L (ref 2–8.1)
NEUTROPHILS RELATIVE PERCENT: 50.09 % (ref 42.2–75.2)
PDW BLD-RTO: 12.2 % (ref 8.5–15.5)
PLATELET # BLD: 366.5 THOU/MM3 (ref 130–400)
POTASSIUM SERPL-SCNC: 4.4 MMOL/L (ref 3.6–5)
RBC # BLD: 4.5 M/UL (ref 4.2–5.4)
SODIUM BLD-SCNC: 141 MMOL/L (ref 135–145)
TOTAL PROTEIN: 6.8 G/DL (ref 6.3–8.2)
WBC # BLD: 8 THOU/ML3 (ref 4.8–10.8)

## 2024-08-28 LAB
APTT: 29.6 SEC (ref 22–32)
BACTERIA, URINE: ABNORMAL /HPF
BILIRUBIN, URINE: NEGATIVE
BLOOD, URINE: ABNORMAL
CASTS UA: ABNORMAL /LPF
CLARITY, UA: ABNORMAL
COLOR, UA: YELLOW
CRYSTALS, UA: ABNORMAL
GLUCOSE URINE: NEGATIVE MG/DL
INR BLD: 1 RATIO (ref 0.8–1.2)
KETONES, URINE: NEGATIVE MG/DL
LEUKOCYTE ESTERASE, URINE: NEGATIVE
MUCUS, URINE: ABNORMAL
NITRITE, URINE: NEGATIVE
PH, URINE: 6 (ref 5–8.5)
PROTEIN UA: NEGATIVE MG/DL
PROTHROMBIN TIME: 11.3 SEC (ref 9.3–12.5)
RBC URINE: ABNORMAL /HPF (ref 0–2)
SPECIFIC GRAVITY UA: 1.02 MG/DL (ref 1–1.03)
SQUAMOUS EPITHELIAL: ABNORMAL /HPF
UROBILINOGEN, URINE: 0.2 MG/DL (ref 0.2–1)
WBC URINE: ABNORMAL /HPF (ref 0–4)

## 2024-08-29 LAB — PREGNANCY, URINE: NEGATIVE

## 2024-09-04 LAB
BACTERIA, URINE: ABNORMAL /HPF
BILIRUBIN, URINE: NEGATIVE
BLOOD, URINE: NEGATIVE
CASTS UA: ABNORMAL /LPF
CLARITY, UA: ABNORMAL
COLOR, UA: YELLOW
CRYSTALS, UA: ABNORMAL
GLUCOSE URINE: NEGATIVE MG/DL
KETONES, URINE: NEGATIVE MG/DL
LEUKOCYTE ESTERASE, URINE: NEGATIVE
MUCUS, URINE: ABNORMAL
NITRITE, URINE: NEGATIVE
PH, URINE: 6 (ref 5–8.5)
PROTEIN UA: NEGATIVE MG/DL
RBC URINE: ABNORMAL /HPF (ref 0–2)
SPECIFIC GRAVITY UA: 1.02 MG/DL (ref 1–1.03)
SQUAMOUS EPITHELIAL: ABNORMAL /HPF
UROBILINOGEN, URINE: 0.2 MG/DL (ref 0.2–1)
WBC URINE: ABNORMAL /HPF (ref 0–4)

## 2024-09-09 ENCOUNTER — OFFICE VISIT (OUTPATIENT)
Dept: FAMILY MEDICINE CLINIC | Age: 34
End: 2024-09-09
Payer: COMMERCIAL

## 2024-09-09 VITALS
DIASTOLIC BLOOD PRESSURE: 70 MMHG | SYSTOLIC BLOOD PRESSURE: 110 MMHG | BODY MASS INDEX: 51.69 KG/M2 | WEIGHT: 293 LBS | TEMPERATURE: 99 F | HEART RATE: 93 BPM | OXYGEN SATURATION: 98 %

## 2024-09-09 DIAGNOSIS — Z87.891 FORMER SMOKER: ICD-10-CM

## 2024-09-09 DIAGNOSIS — J45.21 MILD INTERMITTENT ASTHMA WITH EXACERBATION: Primary | ICD-10-CM

## 2024-09-09 DIAGNOSIS — J06.9 VIRAL URI: ICD-10-CM

## 2024-09-09 DIAGNOSIS — R05.1 ACUTE COUGH: ICD-10-CM

## 2024-09-09 DIAGNOSIS — R09.81 HEAD CONGESTION: ICD-10-CM

## 2024-09-09 LAB
INFLUENZA A ANTIGEN, POC: NEGATIVE
INFLUENZA B ANTIGEN, POC: NEGATIVE
LOT EXPIRE DATE: NORMAL
LOT KIT NUMBER: NORMAL
SARS-COV-2, POC: NORMAL
VALID INTERNAL CONTROL: NORMAL
VENDOR AND KIT NAME POC: NORMAL

## 2024-09-09 PROCEDURE — G8427 DOCREV CUR MEDS BY ELIG CLIN: HCPCS | Performed by: STUDENT IN AN ORGANIZED HEALTH CARE EDUCATION/TRAINING PROGRAM

## 2024-09-09 PROCEDURE — G8417 CALC BMI ABV UP PARAM F/U: HCPCS | Performed by: STUDENT IN AN ORGANIZED HEALTH CARE EDUCATION/TRAINING PROGRAM

## 2024-09-09 PROCEDURE — 99212 OFFICE O/P EST SF 10 MIN: CPT | Performed by: STUDENT IN AN ORGANIZED HEALTH CARE EDUCATION/TRAINING PROGRAM

## 2024-09-09 PROCEDURE — 87428 SARSCOV & INF VIR A&B AG IA: CPT | Performed by: STUDENT IN AN ORGANIZED HEALTH CARE EDUCATION/TRAINING PROGRAM

## 2024-09-09 PROCEDURE — 99214 OFFICE O/P EST MOD 30 MIN: CPT | Performed by: STUDENT IN AN ORGANIZED HEALTH CARE EDUCATION/TRAINING PROGRAM

## 2024-09-09 PROCEDURE — PBSHW POCT COVID-19 & INFLUENZA A/B: Performed by: STUDENT IN AN ORGANIZED HEALTH CARE EDUCATION/TRAINING PROGRAM

## 2024-09-09 PROCEDURE — 1036F TOBACCO NON-USER: CPT | Performed by: STUDENT IN AN ORGANIZED HEALTH CARE EDUCATION/TRAINING PROGRAM

## 2024-09-09 RX ORDER — IPRATROPIUM BROMIDE 42 UG/1
2 SPRAY, METERED NASAL 4 TIMES DAILY
COMMUNITY

## 2024-09-09 RX ORDER — ALBUTEROL SULFATE 90 UG/1
2 INHALANT RESPIRATORY (INHALATION) EVERY 6 HOURS PRN
COMMUNITY

## 2024-09-09 RX ORDER — KETOCONAZOLE 20 MG/ML
SHAMPOO TOPICAL DAILY PRN
COMMUNITY

## 2024-09-09 RX ORDER — ALBUTEROL SULFATE 90 UG/1
2 INHALANT RESPIRATORY (INHALATION) EVERY 4 HOURS PRN
Qty: 18 G | Refills: 0 | Status: SHIPPED | OUTPATIENT
Start: 2024-09-09

## 2024-09-09 RX ORDER — BENZONATATE 100 MG/1
100 CAPSULE ORAL 3 TIMES DAILY PRN
Qty: 30 CAPSULE | Refills: 0 | Status: SHIPPED | OUTPATIENT
Start: 2024-09-09 | End: 2024-09-19

## 2024-09-09 RX ORDER — PREDNISONE 20 MG/1
20 TABLET ORAL DAILY
Qty: 5 TABLET | Refills: 0 | Status: SHIPPED | OUTPATIENT
Start: 2024-09-09 | End: 2024-09-14

## 2024-09-09 RX ORDER — FLUOCINONIDE TOPICAL SOLUTION USP, 0.05% 0.5 MG/ML
SOLUTION TOPICAL 2 TIMES DAILY
COMMUNITY

## 2024-09-09 RX ORDER — CEFADROXIL 500 MG/1
500 CAPSULE ORAL 2 TIMES DAILY
COMMUNITY
Start: 2024-09-05 | End: 2024-09-11

## 2024-09-09 RX ORDER — DICYCLOMINE HCL 20 MG
20 TABLET ORAL EVERY 6 HOURS
COMMUNITY

## 2024-09-09 RX ORDER — MECLIZINE HYDROCHLORIDE 25 MG/1
25 TABLET ORAL 3 TIMES DAILY PRN
COMMUNITY

## 2024-09-09 RX ORDER — AMMONIUM LACTATE 12 G/100G
LOTION TOPICAL PRN
COMMUNITY

## 2024-09-09 RX ORDER — NORGESTREL-ETHINYL ESTRADIOL 0.3-0.03MG
1 TABLET ORAL DAILY
COMMUNITY

## 2024-09-09 RX ORDER — TRIAMCINOLONE ACETONIDE 0.25 MG/G
CREAM TOPICAL EVERY 4 HOURS PRN
COMMUNITY

## 2024-09-16 ENCOUNTER — OFFICE VISIT (OUTPATIENT)
Dept: FAMILY MEDICINE CLINIC | Age: 34
End: 2024-09-16
Payer: COMMERCIAL

## 2024-09-16 VITALS
BODY MASS INDEX: 53.25 KG/M2 | HEART RATE: 97 BPM | TEMPERATURE: 99.6 F | WEIGHT: 293 LBS | OXYGEN SATURATION: 98 % | DIASTOLIC BLOOD PRESSURE: 64 MMHG | SYSTOLIC BLOOD PRESSURE: 114 MMHG

## 2024-09-16 DIAGNOSIS — R06.2 WHEEZING: ICD-10-CM

## 2024-09-16 DIAGNOSIS — Z87.891 FORMER SMOKER: ICD-10-CM

## 2024-09-16 DIAGNOSIS — J40 BRONCHITIS: Primary | ICD-10-CM

## 2024-09-16 DIAGNOSIS — J45.21 MILD INTERMITTENT ASTHMA WITH EXACERBATION: ICD-10-CM

## 2024-09-16 PROCEDURE — 99214 OFFICE O/P EST MOD 30 MIN: CPT | Performed by: STUDENT IN AN ORGANIZED HEALTH CARE EDUCATION/TRAINING PROGRAM

## 2024-09-16 PROCEDURE — 1036F TOBACCO NON-USER: CPT | Performed by: STUDENT IN AN ORGANIZED HEALTH CARE EDUCATION/TRAINING PROGRAM

## 2024-09-16 PROCEDURE — 99212 OFFICE O/P EST SF 10 MIN: CPT | Performed by: STUDENT IN AN ORGANIZED HEALTH CARE EDUCATION/TRAINING PROGRAM

## 2024-09-16 PROCEDURE — G8427 DOCREV CUR MEDS BY ELIG CLIN: HCPCS | Performed by: STUDENT IN AN ORGANIZED HEALTH CARE EDUCATION/TRAINING PROGRAM

## 2024-09-16 PROCEDURE — G8417 CALC BMI ABV UP PARAM F/U: HCPCS | Performed by: STUDENT IN AN ORGANIZED HEALTH CARE EDUCATION/TRAINING PROGRAM

## 2024-09-16 RX ORDER — PREDNISONE 10 MG/1
TABLET ORAL
Qty: 11 TABLET | Refills: 0 | Status: SHIPPED | OUTPATIENT
Start: 2024-09-16 | End: 2024-09-26

## 2024-09-16 RX ORDER — DOXYCYCLINE HYCLATE 100 MG
100 TABLET ORAL 2 TIMES DAILY
Qty: 14 TABLET | Refills: 0 | Status: SHIPPED | OUTPATIENT
Start: 2024-09-16 | End: 2024-09-23

## 2024-09-16 RX ORDER — IPRATROPIUM BROMIDE AND ALBUTEROL SULFATE 2.5; .5 MG/3ML; MG/3ML
1 SOLUTION RESPIRATORY (INHALATION) EVERY 6 HOURS PRN
Qty: 360 ML | Refills: 0 | Status: SHIPPED | OUTPATIENT
Start: 2024-09-16

## 2024-11-12 ENCOUNTER — OFFICE VISIT (OUTPATIENT)
Dept: FAMILY MEDICINE CLINIC | Age: 34
End: 2024-11-12
Payer: COMMERCIAL

## 2024-11-12 VITALS
DIASTOLIC BLOOD PRESSURE: 90 MMHG | HEART RATE: 80 BPM | BODY MASS INDEX: 52.31 KG/M2 | WEIGHT: 293 LBS | SYSTOLIC BLOOD PRESSURE: 144 MMHG | OXYGEN SATURATION: 98 %

## 2024-11-12 DIAGNOSIS — F90.2 ATTENTION DEFICIT HYPERACTIVITY DISORDER (ADHD), COMBINED TYPE: Primary | ICD-10-CM

## 2024-11-12 DIAGNOSIS — R11.2 INTRACTABLE NAUSEA AND VOMITING: ICD-10-CM

## 2024-11-12 DIAGNOSIS — R11.0 NAUSEA: ICD-10-CM

## 2024-11-12 PROCEDURE — 99213 OFFICE O/P EST LOW 20 MIN: CPT | Performed by: STUDENT IN AN ORGANIZED HEALTH CARE EDUCATION/TRAINING PROGRAM

## 2024-11-12 PROCEDURE — G8484 FLU IMMUNIZE NO ADMIN: HCPCS | Performed by: STUDENT IN AN ORGANIZED HEALTH CARE EDUCATION/TRAINING PROGRAM

## 2024-11-12 PROCEDURE — G8427 DOCREV CUR MEDS BY ELIG CLIN: HCPCS | Performed by: STUDENT IN AN ORGANIZED HEALTH CARE EDUCATION/TRAINING PROGRAM

## 2024-11-12 PROCEDURE — 99212 OFFICE O/P EST SF 10 MIN: CPT | Performed by: STUDENT IN AN ORGANIZED HEALTH CARE EDUCATION/TRAINING PROGRAM

## 2024-11-12 PROCEDURE — G8417 CALC BMI ABV UP PARAM F/U: HCPCS | Performed by: STUDENT IN AN ORGANIZED HEALTH CARE EDUCATION/TRAINING PROGRAM

## 2024-11-12 PROCEDURE — 1036F TOBACCO NON-USER: CPT | Performed by: STUDENT IN AN ORGANIZED HEALTH CARE EDUCATION/TRAINING PROGRAM

## 2024-11-12 RX ORDER — DEXMETHYLPHENIDATE HYDROCHLORIDE 20 MG/1
20 CAPSULE, EXTENDED RELEASE ORAL DAILY
Qty: 30 CAPSULE | Refills: 0 | Status: SHIPPED | OUTPATIENT
Start: 2024-11-12 | End: 2024-12-12

## 2024-11-12 RX ORDER — METOCLOPRAMIDE 5 MG/1
2.5 TABLET ORAL 3 TIMES DAILY PRN
Qty: 30 TABLET | Refills: 0 | Status: SHIPPED | OUTPATIENT
Start: 2024-11-12

## 2024-11-12 RX ORDER — ONDANSETRON 4 MG/1
4 TABLET, FILM COATED ORAL EVERY 8 HOURS PRN
Qty: 90 TABLET | Refills: 0 | Status: SHIPPED | OUTPATIENT
Start: 2024-11-12

## 2024-11-12 NOTE — PROGRESS NOTES
General: No deformity. Normal range of motion.      Cervical back: Normal range of motion and neck supple.      Right lower leg: No edema.      Left lower leg: No edema.   Skin:     General: Skin is warm and dry.      Findings: No lesion.   Neurological:      General: No focal deficit present.      Mental Status: She is alert and oriented to person, place, and time.   Psychiatric:         Mood and Affect: Mood normal.         Behavior: Behavior normal.           Please be aware portions of this note were completed using voice recognition software and unforeseen errors may have occurred    I personally reviewed the patient's past medical history, current medications, allergies, surgical history, family history and social history.  Updates were made as necessary.    Electronically signed by Robert Nicholson MD on 11/24/2024 at 9:07 PM

## 2024-11-13 ENCOUNTER — TELEPHONE (OUTPATIENT)
Dept: FAMILY MEDICINE CLINIC | Age: 34
End: 2024-11-13

## 2024-12-10 ENCOUNTER — OFFICE VISIT (OUTPATIENT)
Dept: FAMILY MEDICINE CLINIC | Age: 34
End: 2024-12-10
Payer: COMMERCIAL

## 2024-12-10 VITALS — OXYGEN SATURATION: 96 % | SYSTOLIC BLOOD PRESSURE: 128 MMHG | HEART RATE: 82 BPM | DIASTOLIC BLOOD PRESSURE: 80 MMHG

## 2024-12-10 DIAGNOSIS — R10.84 GENERALIZED ABDOMINAL PAIN: ICD-10-CM

## 2024-12-10 DIAGNOSIS — J45.20 MILD INTERMITTENT ASTHMA WITHOUT COMPLICATION: ICD-10-CM

## 2024-12-10 DIAGNOSIS — R19.7 DIARRHEA, UNSPECIFIED TYPE: ICD-10-CM

## 2024-12-10 DIAGNOSIS — F90.2 ATTENTION DEFICIT HYPERACTIVITY DISORDER (ADHD), COMBINED TYPE: Primary | ICD-10-CM

## 2024-12-10 DIAGNOSIS — R11.0 NAUSEA: ICD-10-CM

## 2024-12-10 DIAGNOSIS — E55.9 HYPOVITAMINOSIS D: ICD-10-CM

## 2024-12-10 PROCEDURE — G8427 DOCREV CUR MEDS BY ELIG CLIN: HCPCS | Performed by: NURSE PRACTITIONER

## 2024-12-10 PROCEDURE — 99213 OFFICE O/P EST LOW 20 MIN: CPT | Performed by: NURSE PRACTITIONER

## 2024-12-10 PROCEDURE — G8417 CALC BMI ABV UP PARAM F/U: HCPCS | Performed by: NURSE PRACTITIONER

## 2024-12-10 PROCEDURE — 1036F TOBACCO NON-USER: CPT | Performed by: NURSE PRACTITIONER

## 2024-12-10 PROCEDURE — G8484 FLU IMMUNIZE NO ADMIN: HCPCS | Performed by: NURSE PRACTITIONER

## 2024-12-10 PROCEDURE — 99212 OFFICE O/P EST SF 10 MIN: CPT | Performed by: NURSE PRACTITIONER

## 2024-12-10 RX ORDER — ALBUTEROL SULFATE 90 UG/1
2 INHALANT RESPIRATORY (INHALATION) EVERY 4 HOURS PRN
Qty: 18 G | Refills: 0 | Status: SHIPPED | OUTPATIENT
Start: 2024-12-10

## 2024-12-10 RX ORDER — METOCLOPRAMIDE 5 MG/1
5 TABLET ORAL 3 TIMES DAILY
Qty: 90 TABLET | Refills: 0 | Status: SHIPPED | OUTPATIENT
Start: 2024-12-10 | End: 2025-01-09

## 2024-12-10 RX ORDER — FAMOTIDINE 20 MG
1 TABLET ORAL DAILY
Qty: 90 CAPSULE | Refills: 3 | Status: SHIPPED | OUTPATIENT
Start: 2024-12-10 | End: 2025-12-05

## 2024-12-10 NOTE — PROGRESS NOTES
[address], [address]  [place], [place] [address]  Dept: [phone]  Dept Fax: [phone]    Date of Service:  12/10/2024    Chief Complaint   Patient presents with   • ADHD     Had to stop taking medication, felt sick.    • Nausea     Has been constantly throwing up           Diagnoses / Plan:     Assessment & Plan  Attention deficit hyperactivity disorder (ADHD), combined type  Hold ADHD medication management until GI symptoms stabilized     Generalized abdominal pain  Orders:  •  metoclopramide (REGLAN) 5 MG tablet; Take 1 tablet by mouth 3 times daily    Nausea  - Increase Reglan to 5mg TID for 1 week trial before GI follow-up  - Continue Ondansetron 4mg as needed  - Upcoming GI consultation with Dr. Alvarenga on Monday the 16th  Orders:  •  metoclopramide (REGLAN) 5 MG tablet; Take 1 tablet by mouth 3 times daily    Diarrhea, unspecified type    Scheduled for follow up with GI in a few days    Mild intermittent asthma without complication   Chronic, at goal (stable), continue current treatment plan  Orders:  •  albuterol sulfate HFA (PROVENTIL;VENTOLIN;PROAIR) 108 (90 Base) MCG/ACT inhaler; Inhale 2 puffs into the lungs every 4 hours as needed for Wheezing or Shortness of Breath    Hypovitaminosis D  Orders:  •  vitamin D (VITAMIN D, CHOLECALCIFEROL,) 25 MCG (1000 UT) CAPS; Take 1 capsule by mouth daily    Follow-up:  - After GI consultation  - Sooner if symptoms worsen or new concerns arise    Additional Notes:  Patient has upcoming GI specialist appointment. Consider gastroparesis in differential diagnosis given chronic symptoms and partial response to Reglan.      Return in about 1 week (around 12/17/2024) for nausea, diarrhea, abdominal pain.     Subjective:   History of Present Illness:  Patient presents for follow-up of chronic nausea, vomiting, and abdominal pain. Reports ongoing symptoms since her child was [age] year old. Current symptoms include

## 2024-12-10 NOTE — ASSESSMENT & PLAN NOTE
Orders:    vitamin D (VITAMIN D, CHOLECALCIFEROL,) 25 MCG (1000 UT) CAPS; Take 1 capsule by mouth daily

## 2024-12-16 ENCOUNTER — TELEPHONE (OUTPATIENT)
Dept: GASTROENTEROLOGY | Age: 34
End: 2024-12-16

## 2024-12-16 ENCOUNTER — OFFICE VISIT (OUTPATIENT)
Dept: GASTROENTEROLOGY | Age: 34
End: 2024-12-16
Payer: COMMERCIAL

## 2024-12-16 VITALS
WEIGHT: 293 LBS | TEMPERATURE: 98.4 F | SYSTOLIC BLOOD PRESSURE: 153 MMHG | BODY MASS INDEX: 51.69 KG/M2 | DIASTOLIC BLOOD PRESSURE: 102 MMHG

## 2024-12-16 DIAGNOSIS — K44.9 HIATAL HERNIA: ICD-10-CM

## 2024-12-16 DIAGNOSIS — K21.9 GASTRO-ESOPHAGEAL REFLUX DISEASE WITHOUT ESOPHAGITIS: Primary | ICD-10-CM

## 2024-12-16 DIAGNOSIS — E66.01 MORBID OBESITY: ICD-10-CM

## 2024-12-16 PROCEDURE — 1036F TOBACCO NON-USER: CPT | Performed by: INTERNAL MEDICINE

## 2024-12-16 PROCEDURE — G8417 CALC BMI ABV UP PARAM F/U: HCPCS | Performed by: INTERNAL MEDICINE

## 2024-12-16 PROCEDURE — 99204 OFFICE O/P NEW MOD 45 MIN: CPT | Performed by: INTERNAL MEDICINE

## 2024-12-16 PROCEDURE — G8427 DOCREV CUR MEDS BY ELIG CLIN: HCPCS | Performed by: INTERNAL MEDICINE

## 2024-12-16 PROCEDURE — G8484 FLU IMMUNIZE NO ADMIN: HCPCS | Performed by: INTERNAL MEDICINE

## 2024-12-16 ASSESSMENT — ENCOUNTER SYMPTOMS
CONSTIPATION: 0
COUGH: 0
WHEEZING: 0
CHOKING: 0
SORE THROAT: 0
BLOOD IN STOOL: 0
SHORTNESS OF BREATH: 0
ABDOMINAL PAIN: 1
NAUSEA: 1
DIARRHEA: 1
VOICE CHANGE: 0
ABDOMINAL DISTENTION: 0
RECTAL PAIN: 0
ANAL BLEEDING: 0
TROUBLE SWALLOWING: 0
VOMITING: 1

## 2024-12-16 NOTE — PROGRESS NOTES
ISRAELSTEVE 0.1106 08/21/2024         DIAGNOSTIC TESTING:     No results found.       Assessment  1. Gastro-esophageal reflux disease without esophagitis    2. Morbid obesity    3. Hiatal hernia        Plan    Plan EGD to evaluate    The Endoscopic procedure was explained to the patient in detail  The prep and NPO were explained  All the Risks, Benefits, and Alternatives were explained  Risk of Bleeding, Perforation and Cardio Respiratory risks were explained  her questions were answered  The procedure has been scheduled with the  in the office  Patient was asked to give us a call for any questions  The patient has verbalized understanding and agreement to this plan.     Refer [facility] weight loss    She says that her insurance don't cover visits    Pt was given advices and instructions about weight loss. She was advised about the significance of exercise at least three times a week .   Dietary advices were also given about the avoidance of fast food, fried food and lots of spices and grease.     nstructions were given about using ample amount of fiber including dietary and supplemental fiber either metamucil, bennafiber or citrucell etc.  Pt was advised about drinking ample amount of water without any colors or chemicals. Stress was given about regular exercise.    Pt was told to stay way from soda pops.    Pt has verbalized understanding and agrrement    Pt was advised in detail about some life style and dietary modifications. She was advised about avoidance of caffeine, nicotine and chocolate. Pt was also told to stay away from any kind of fast foods, soda pops. She was also advised to avoid lots of spices, grease and fried food etc.     Instructions were also given about trying to arrange the timing, quality and quantity of food.    Instructions were given about using ample amount of fiber including dietary and supplemental fiber either metamucil, bennafiber or citrucell etc.  Pt was advised about drinking

## 2024-12-16 NOTE — TELEPHONE ENCOUNTER
Patient contacted office regarding her appt today with Dr SARANYA Alvarenga, per patient she can make the original appt time which was 3:15pm, not 1:15pm-writer spoke to Dr SARANYA Alvarenga procedure  confirmed patient to come in at 3:15pm however, can not be any later than 3:15pm patient agreed.

## 2024-12-17 ENCOUNTER — PREP FOR PROCEDURE (OUTPATIENT)
Dept: GASTROENTEROLOGY | Age: 34
End: 2024-12-17

## 2024-12-17 ENCOUNTER — TELEPHONE (OUTPATIENT)
Dept: GASTROENTEROLOGY | Age: 34
End: 2024-12-17

## 2024-12-17 DIAGNOSIS — K21.9 GASTROESOPHAGEAL REFLUX DISEASE WITHOUT ESOPHAGITIS: ICD-10-CM

## 2024-12-17 NOTE — TELEPHONE ENCOUNTER
Procedure scheduled/Dr CLARENCE Alvarenga  Procedure: EGD   Dx: GERD without esophagitis / morbid obesity / hiatal hernia  Date: 5/22/25  Time: 11:15 a.m.   Hospital: [facility]   Bowel Prep instructions given:  In office/via phone: office   Clearance needed: no

## 2024-12-22 PROBLEM — R19.7 DIARRHEA: Status: ACTIVE | Noted: 2024-12-22

## 2024-12-22 PROBLEM — R10.84 GENERALIZED ABDOMINAL PAIN: Status: ACTIVE | Noted: 2024-12-22

## 2024-12-22 PROBLEM — R11.0 NAUSEA: Status: ACTIVE | Noted: 2024-12-22

## 2024-12-22 PROBLEM — J45.20 MILD INTERMITTENT ASTHMA WITHOUT COMPLICATION: Status: ACTIVE | Noted: 2024-12-22

## 2024-12-22 PROBLEM — F90.2 ATTENTION DEFICIT HYPERACTIVITY DISORDER (ADHD), COMBINED TYPE: Status: ACTIVE | Noted: 2024-12-22

## 2025-01-23 ENCOUNTER — OFFICE VISIT (OUTPATIENT)
Dept: FAMILY MEDICINE CLINIC | Age: 35
End: 2025-01-23
Payer: COMMERCIAL

## 2025-01-23 VITALS
WEIGHT: 293 LBS | HEART RATE: 89 BPM | OXYGEN SATURATION: 98 % | TEMPERATURE: 98.7 F | BODY MASS INDEX: 52 KG/M2 | SYSTOLIC BLOOD PRESSURE: 134 MMHG | DIASTOLIC BLOOD PRESSURE: 94 MMHG

## 2025-01-23 DIAGNOSIS — R68.89 FLU-LIKE SYMPTOMS: ICD-10-CM

## 2025-01-23 DIAGNOSIS — J40 WHEEZY BRONCHITIS: Primary | ICD-10-CM

## 2025-01-23 PROCEDURE — G8417 CALC BMI ABV UP PARAM F/U: HCPCS | Performed by: NURSE PRACTITIONER

## 2025-01-23 PROCEDURE — 1036F TOBACCO NON-USER: CPT | Performed by: NURSE PRACTITIONER

## 2025-01-23 PROCEDURE — 99212 OFFICE O/P EST SF 10 MIN: CPT | Performed by: NURSE PRACTITIONER

## 2025-01-23 PROCEDURE — PBSHW POCT COVID-19 & INFLUENZA A/B: Performed by: NURSE PRACTITIONER

## 2025-01-23 PROCEDURE — 99213 OFFICE O/P EST LOW 20 MIN: CPT | Performed by: NURSE PRACTITIONER

## 2025-01-23 PROCEDURE — G8427 DOCREV CUR MEDS BY ELIG CLIN: HCPCS | Performed by: NURSE PRACTITIONER

## 2025-01-23 PROCEDURE — 87428 SARSCOV & INF VIR A&B AG IA: CPT | Performed by: NURSE PRACTITIONER

## 2025-01-23 RX ORDER — PREDNISONE 20 MG/1
20 TABLET ORAL DAILY
Qty: 3 TABLET | Refills: 0 | Status: SHIPPED | OUTPATIENT
Start: 2025-01-23 | End: 2025-01-26

## 2025-01-23 NOTE — PROGRESS NOTES
[place] [id number]  [place] [address]  Dept: [phone]  Dept Fax: [phone]    Date of Service:  2025    Deanna Sutton is a 34 y.o. female who presents today for her medical conditions/complaints as noted below.      Chief Complaint   Patient presents with    Cough     States has had a cough since Dec. Worsened with recent cold weather, has been using nebulizer and rescue inhaler (bid or tid), also c/o diarrhea and upset stomach      DIAGNOSIS / PLAN:   Wheezy bronchitis  -     amoxicillin-clavulanate (AUGMENTIN) 875-125 MG per tablet; Take 1 tablet by mouth 2 times daily for 10 days, Disp-20 tablet, R-0Normal  -     predniSONE (DELTASONE) 20 MG tablet; Take 1 tablet by mouth daily for 3 days, Disp-3 tablet, R-0Normal  Flu-like symptoms  -     POCT COVID-19 & Influenza A/B     Assessment & Plan  1. Persistent cough: Worsened by movement and talking, with clear to greenish phlegm. Wheezing, dyspnea, chest tightness, and pulmonary discomfort. Symptoms initially improved but worsened with cold weather.  - Conduct tests for influenza and COVID-19  - If tests are negative, prescribe an antibiotic for potential bacterial infection    2. Upper respiratory infection: Symptoms mostly resolved except for facial pressure under the eyes.    3. Gastrointestinal symptoms: Nausea, vomiting, diarrhea, and mild abdominal discomfort, possibly related to current illness.     Results  Component  Ref Range & Units 25 1356 24 1343   SARS-COV-2, POC  Not Detected Not-Detected Not-Detected   Influenza A Antigen, POC  Not Detected Negative Negative R   Influenza B Antigen, POC  Not Detected Negative Negative R   Vendor and kit name [employer]   Internal Control pass pass   Lot/Kit Number [id number]   Lot/Kit  date: 2025          Discussed use, benefit, and side effects of prescribed medications.  All patient questions answered.  Pt

## 2025-01-27 ENCOUNTER — PATIENT MESSAGE (OUTPATIENT)
Dept: FAMILY MEDICINE CLINIC | Age: 35
End: 2025-01-27

## 2025-01-28 DIAGNOSIS — R05.1 ACUTE COUGH: Primary | ICD-10-CM

## 2025-01-28 RX ORDER — BENZONATATE 200 MG/1
200 CAPSULE ORAL 3 TIMES DAILY PRN
Qty: 30 CAPSULE | Refills: 0 | Status: SHIPPED | OUTPATIENT
Start: 2025-01-28 | End: 2025-02-07

## 2025-02-14 ENCOUNTER — TELEPHONE (OUTPATIENT)
Dept: FAMILY MEDICINE CLINIC | Age: 35
End: 2025-02-14

## 2025-03-03 DIAGNOSIS — R11.0 NAUSEA: ICD-10-CM

## 2025-03-03 DIAGNOSIS — R10.84 GENERALIZED ABDOMINAL PAIN: ICD-10-CM

## 2025-03-03 RX ORDER — METOCLOPRAMIDE 5 MG/1
5 TABLET ORAL 3 TIMES DAILY
Qty: 90 TABLET | Refills: 1 | Status: SHIPPED | OUTPATIENT
Start: 2025-03-03

## 2025-03-03 NOTE — TELEPHONE ENCOUNTER
Deanna Sutton is requesting a refill on the following medication(s):  Requested Prescriptions     Pending Prescriptions Disp Refills    metoclopramide (REGLAN) 5 MG tablet [Pharmacy Med Name: Metoclopramide HCl 5 MG Oral Tablet] 90 tablet 0     Sig: TAKE 1 TABLET BY MOUTH THREE TIMES DAILY       Last Visit Date (If Applicable):  1/23/2025    Next Visit Date:    Visit date not found

## 2025-03-18 LAB
BASOPHILS ABSOLUTE: 0.11 X10E3/?L (ref 0–0.3)
BASOPHILS RELATIVE PERCENT: 1.59 % (ref 0–3)
C-REACTIVE PROTEIN: < 0.5 MG/DL (ref 0–1)
EOSINOPHILS ABSOLUTE: 0.17 X10E3/?L (ref 0–1.1)
EOSINOPHILS RELATIVE PERCENT: 2.55 % (ref 0–10)
HCT VFR BLD CALC: 45.3 % (ref 37–47)
HEMOGLOBIN: 14.8 G/DL (ref 12–16)
LYMPHOCYTES ABSOLUTE: 2.04 X10E3/?L (ref 1–5.5)
LYMPHOCYTES RELATIVE PERCENT: 29.93 % (ref 20–51.1)
MCH RBC QN AUTO: 27.3 PG (ref 28.5–32.5)
MCHC RBC AUTO-ENTMCNC: 32.6 G/DL (ref 32–37)
MCV RBC AUTO: 83.9 FL (ref 80–94)
MISCELLANEOUS LAB TEST RESULT: NORMAL
MONOCYTES ABSOLUTE: 0.47 X10E3/?L (ref 0.1–1)
MONOCYTES RELATIVE PERCENT: 6.93 % (ref 1.7–9.3)
NEUTROPHILS ABSOLUTE: 4.02 X10E3/?L (ref 2–8.1)
NEUTROPHILS RELATIVE PERCENT: 59 % (ref 42.2–75.2)
PDW BLD-RTO: 14.6 % (ref 8.5–15.5)
PLATELET # BLD: 371.9 THOU/MM3 (ref 130–400)
RBC # BLD: 5.4 M/UL (ref 4.2–5.4)
SED RATE, AUTOMATED: 5 MM/HR (ref 0–20)
TSH REFLEX FT4: 1.78 MIU/ML (ref 0.49–4.67)
WBC # BLD: 6.8 THOU/ML3 (ref 4.8–10.8)

## 2025-04-14 LAB
ANA SCREEN: NEGATIVE
ANTI DNA DOUBLE STRANDED: 1.2
ENA: 0.3
RHEUMATOID FACTOR: <10
THYROID PEROXIDASE ANTIBODIES: <4

## 2025-04-23 ENCOUNTER — OFFICE VISIT (OUTPATIENT)
Dept: FAMILY MEDICINE CLINIC | Age: 35
End: 2025-04-23
Payer: COMMERCIAL

## 2025-04-23 VITALS
DIASTOLIC BLOOD PRESSURE: 96 MMHG | HEART RATE: 74 BPM | WEIGHT: 293 LBS | SYSTOLIC BLOOD PRESSURE: 130 MMHG | BODY MASS INDEX: 52.56 KG/M2 | TEMPERATURE: 98.4 F | OXYGEN SATURATION: 97 %

## 2025-04-23 DIAGNOSIS — K21.9 GASTRO-ESOPHAGEAL REFLUX DISEASE WITHOUT ESOPHAGITIS: ICD-10-CM

## 2025-04-23 DIAGNOSIS — J30.9 ALLERGIC RHINITIS, UNSPECIFIED SEASONALITY, UNSPECIFIED TRIGGER: Primary | ICD-10-CM

## 2025-04-23 DIAGNOSIS — J01.90 ACUTE BACTERIAL SINUSITIS: ICD-10-CM

## 2025-04-23 DIAGNOSIS — B96.89 ACUTE BACTERIAL SINUSITIS: ICD-10-CM

## 2025-04-23 PROCEDURE — 99212 OFFICE O/P EST SF 10 MIN: CPT | Performed by: STUDENT IN AN ORGANIZED HEALTH CARE EDUCATION/TRAINING PROGRAM

## 2025-04-23 PROCEDURE — 1036F TOBACCO NON-USER: CPT | Performed by: STUDENT IN AN ORGANIZED HEALTH CARE EDUCATION/TRAINING PROGRAM

## 2025-04-23 PROCEDURE — G8427 DOCREV CUR MEDS BY ELIG CLIN: HCPCS | Performed by: STUDENT IN AN ORGANIZED HEALTH CARE EDUCATION/TRAINING PROGRAM

## 2025-04-23 PROCEDURE — 99213 OFFICE O/P EST LOW 20 MIN: CPT | Performed by: STUDENT IN AN ORGANIZED HEALTH CARE EDUCATION/TRAINING PROGRAM

## 2025-04-23 PROCEDURE — G8417 CALC BMI ABV UP PARAM F/U: HCPCS | Performed by: STUDENT IN AN ORGANIZED HEALTH CARE EDUCATION/TRAINING PROGRAM

## 2025-04-23 RX ORDER — CETIRIZINE HYDROCHLORIDE 10 MG/1
10 TABLET ORAL DAILY
Qty: 90 TABLET | Refills: 1 | Status: SHIPPED | OUTPATIENT
Start: 2025-04-23

## 2025-04-23 RX ORDER — DOXYCYCLINE HYCLATE 100 MG
100 TABLET ORAL 2 TIMES DAILY
Qty: 14 TABLET | Refills: 0 | Status: SHIPPED | OUTPATIENT
Start: 2025-04-23 | End: 2025-04-30

## 2025-04-23 RX ORDER — PANTOPRAZOLE SODIUM 40 MG/1
40 TABLET, DELAYED RELEASE ORAL 2 TIMES DAILY
Qty: 60 TABLET | Refills: 0 | Status: SHIPPED | OUTPATIENT
Start: 2025-04-23

## 2025-04-23 NOTE — PROGRESS NOTES
[address], [address]  [place] [address]  Phone: [phone]  Fax: [phone]      Date of Visit:  25  Patient Name: Deanna Sutton   Patient :  1990     ASSESSMENT/PLAN     1. Allergic rhinitis, unspecified seasonality, unspecified trigger  -     cetirizine (ZYRTEC) 10 MG tablet; Take 1 tablet by mouth daily, Disp-90 tablet, R-1Normal  2. Acute bacterial sinusitis  -     doxycycline hyclate (VIBRA-TABS) 100 MG tablet; Take 1 tablet by mouth 2 times daily for 7 days, Disp-14 tablet, R-0Normal     Doxycycline for sinus infection as above. Return precautions discussed.    - Questions/concerns answered. Patient verbalized and expressed understanding. Medications, laboratory testing, imaging, consultation, and follow up as documented in this encounter.       HPI:     Deanna Sutton is a 34 y.o. female with   Patient Active Problem List   Diagnosis    Morbid obesity    Polycystic ovaries    Fibromyalgia    Gastro-esophageal reflux disease without esophagitis    Hypovitaminosis D    Severe episode of recurrent major depressive disorder, without psychotic features (HCC)    Endometriosis of uterus    Psoriasis    Prolonged menstrual cycle    Hiatal hernia    Gastroesophageal reflux disease without esophagitis    Generalized abdominal pain    Nausea    Diarrhea    Mild intermittent asthma without complication    Attention deficit hyperactivity disorder (ADHD), combined type     who presents today to discuss   Chief Complaint   Patient presents with    Sinusitis     Started with allergy symptoms but now feels like infection is starting       HPI: Patient presents with concern for sinus infection.  She gets it frequently.  Did have allergies initially, however now feels it is a sinus infection.  Significant sinus pressure.      Patient denies any fevers, chills, headaches, visual changes, lightheadedness, dizziness, chest pain, palpitations,

## 2025-04-23 NOTE — TELEPHONE ENCOUNTER
Writer apologized for not seeing the message and after review by Dr. Giles he is on the highest dose, but can change to a different anti-inflammatory. Will change to Diclofenac 75 mg BID #60. Need to know the pharmacy and left call back number.    PCP out of office, Please refill      Deanna Sutton is requesting a refill on the following medication(s):  Requested Prescriptions     Pending Prescriptions Disp Refills    pantoprazole (PROTONIX) 40 MG tablet [Pharmacy Med Name: Pantoprazole Sodium 40 MG Oral Tablet Delayed Release] 60 tablet 0     Sig: Take 1 tablet by mouth twice daily       Last Visit Date (If Applicable):  1/23/2025    Next Visit Date:    4/23/2025

## 2025-04-30 ENCOUNTER — OFFICE VISIT (OUTPATIENT)
Dept: FAMILY MEDICINE CLINIC | Age: 35
End: 2025-04-30
Payer: COMMERCIAL

## 2025-04-30 VITALS
SYSTOLIC BLOOD PRESSURE: 124 MMHG | DIASTOLIC BLOOD PRESSURE: 84 MMHG | WEIGHT: 293 LBS | BODY MASS INDEX: 52.34 KG/M2 | OXYGEN SATURATION: 97 % | HEART RATE: 88 BPM

## 2025-04-30 DIAGNOSIS — J45.20 MILD INTERMITTENT ASTHMA WITHOUT COMPLICATION: ICD-10-CM

## 2025-04-30 DIAGNOSIS — E66.01 MORBID OBESITY (HCC): ICD-10-CM

## 2025-04-30 DIAGNOSIS — J01.00 ACUTE MAXILLARY SINUSITIS, RECURRENCE NOT SPECIFIED: Primary | ICD-10-CM

## 2025-04-30 DIAGNOSIS — F33.2 SEVERE EPISODE OF RECURRENT MAJOR DEPRESSIVE DISORDER, WITHOUT PSYCHOTIC FEATURES (HCC): ICD-10-CM

## 2025-04-30 DIAGNOSIS — R11.0 NAUSEA: ICD-10-CM

## 2025-04-30 DIAGNOSIS — F90.2 ATTENTION DEFICIT HYPERACTIVITY DISORDER (ADHD), COMBINED TYPE: ICD-10-CM

## 2025-04-30 DIAGNOSIS — Z13.31 POSITIVE DEPRESSION SCREENING: ICD-10-CM

## 2025-04-30 PROCEDURE — 99213 OFFICE O/P EST LOW 20 MIN: CPT | Performed by: NURSE PRACTITIONER

## 2025-04-30 PROCEDURE — 99214 OFFICE O/P EST MOD 30 MIN: CPT | Performed by: NURSE PRACTITIONER

## 2025-04-30 PROCEDURE — 1036F TOBACCO NON-USER: CPT | Performed by: NURSE PRACTITIONER

## 2025-04-30 PROCEDURE — G8417 CALC BMI ABV UP PARAM F/U: HCPCS | Performed by: NURSE PRACTITIONER

## 2025-04-30 PROCEDURE — G8427 DOCREV CUR MEDS BY ELIG CLIN: HCPCS | Performed by: NURSE PRACTITIONER

## 2025-04-30 RX ORDER — DOXYCYCLINE HYCLATE 100 MG
100 TABLET ORAL 2 TIMES DAILY
Qty: 14 TABLET | Refills: 0 | Status: SHIPPED | OUTPATIENT
Start: 2025-04-30 | End: 2025-05-07

## 2025-04-30 RX ORDER — NORETHINDRONE 5 MG/1
5 TABLET ORAL DAILY
COMMUNITY
Start: 2025-04-23

## 2025-04-30 RX ORDER — ONDANSETRON 4 MG/1
4 TABLET, FILM COATED ORAL EVERY 8 HOURS PRN
Qty: 90 TABLET | Refills: 0 | Status: SHIPPED | OUTPATIENT
Start: 2025-04-30

## 2025-04-30 RX ORDER — LETROZOLE 2.5 MG/1
2.5 TABLET, FILM COATED ORAL DAILY
COMMUNITY

## 2025-04-30 SDOH — ECONOMIC STABILITY: FOOD INSECURITY: WITHIN THE PAST 12 MONTHS, YOU WORRIED THAT YOUR FOOD WOULD RUN OUT BEFORE YOU GOT MONEY TO BUY MORE.: NEVER TRUE

## 2025-04-30 SDOH — ECONOMIC STABILITY: FOOD INSECURITY: WITHIN THE PAST 12 MONTHS, THE FOOD YOU BOUGHT JUST DIDN'T LAST AND YOU DIDN'T HAVE MONEY TO GET MORE.: NEVER TRUE

## 2025-04-30 ASSESSMENT — COLUMBIA-SUICIDE SEVERITY RATING SCALE - C-SSRS
1. WITHIN THE PAST MONTH, HAVE YOU WISHED YOU WERE DEAD OR WISHED YOU COULD GO TO SLEEP AND NOT WAKE UP?: YES
2. HAVE YOU ACTUALLY HAD ANY THOUGHTS OF KILLING YOURSELF?: NO
6. HAVE YOU EVER DONE ANYTHING, STARTED TO DO ANYTHING, OR PREPARED TO DO ANYTHING TO END YOUR LIFE?: NO

## 2025-04-30 ASSESSMENT — PATIENT HEALTH QUESTIONNAIRE - PHQ9
8. MOVING OR SPEAKING SO SLOWLY THAT OTHER PEOPLE COULD HAVE NOTICED. OR THE OPPOSITE, BEING SO FIGETY OR RESTLESS THAT YOU HAVE BEEN MOVING AROUND A LOT MORE THAN USUAL: MORE THAN HALF THE DAYS
3. TROUBLE FALLING OR STAYING ASLEEP: NEARLY EVERY DAY
6. FEELING BAD ABOUT YOURSELF - OR THAT YOU ARE A FAILURE OR HAVE LET YOURSELF OR YOUR FAMILY DOWN: SEVERAL DAYS
7. TROUBLE CONCENTRATING ON THINGS, SUCH AS READING THE NEWSPAPER OR WATCHING TELEVISION: NEARLY EVERY DAY
SUM OF ALL RESPONSES TO PHQ QUESTIONS 1-9: 21
SUM OF ALL RESPONSES TO PHQ QUESTIONS 1-9: 21
1. LITTLE INTEREST OR PLEASURE IN DOING THINGS: NEARLY EVERY DAY
10. IF YOU CHECKED OFF ANY PROBLEMS, HOW DIFFICULT HAVE THESE PROBLEMS MADE IT FOR YOU TO DO YOUR WORK, TAKE CARE OF THINGS AT HOME, OR GET ALONG WITH OTHER PEOPLE: EXTREMELY DIFFICULT
2. FEELING DOWN, DEPRESSED OR HOPELESS: NEARLY EVERY DAY
SUM OF ALL RESPONSES TO PHQ QUESTIONS 1-9: 21
9. THOUGHTS THAT YOU WOULD BE BETTER OFF DEAD, OR OF HURTING YOURSELF: SEVERAL DAYS
5. POOR APPETITE OR OVEREATING: MORE THAN HALF THE DAYS
SUM OF ALL RESPONSES TO PHQ QUESTIONS 1-9: 20
4. FEELING TIRED OR HAVING LITTLE ENERGY: NEARLY EVERY DAY

## 2025-04-30 NOTE — PROGRESS NOTES
10/18/2021     2:01 PM 7/7/2021     5:00 PM 7/8/2020     4:12 PM   PHQ Scores   PHQ2 Score 6 0 0 6 6 0 1   PHQ9 Score 21 0 7 16 22 0 1     Interpretation of Total Score Depression Severity: 1-4 = Minimal depression, 5-9 = Mild depression, 10-14 = Moderate depression, 15-19 = Moderately severe depression, 20-27 = Severe depression     OBJECTIVE:     Vitals:    04/30/25 1531   BP: 124/84   Pulse: 88   SpO2: 97%   Weight: (!) 151.6 kg (334 lb 3.2 oz)      Estimated body mass index is 52.34 kg/m² as calculated from the following:    Height as of 11/10/23: 1.702 m (5' 7\").    Weight as of this encounter: 151.6 kg (334 lb 3.2 oz).     Physical Exam  Constitutional:       Appearance: She is obese.      Comments: Sickly appearance   HENT:      Head: Normocephalic.      Right Ear: Tympanic membrane and ear canal normal.      Left Ear: Tympanic membrane and ear canal normal.      Nose: Mucosal edema, congestion and rhinorrhea present.      Right Sinus: Maxillary sinus tenderness and frontal sinus tenderness present.      Left Sinus: Maxillary sinus tenderness and frontal sinus tenderness present.      Mouth/Throat:      Mouth: Mucous membranes are moist.      Pharynx: Posterior oropharyngeal erythema present.   Eyes:      Conjunctiva/sclera: Conjunctivae normal.   Cardiovascular:      Rate and Rhythm: Normal rate and regular rhythm.      Heart sounds: Normal heart sounds.   Pulmonary:      Effort: Pulmonary effort is normal.      Breath sounds: Normal breath sounds. No wheezing.   Musculoskeletal:      Cervical back: Neck supple.   Skin:     Capillary Refill: Capillary refill takes less than 2 seconds.   Neurological:      Mental Status: She is alert and oriented to person, place, and time.   Psychiatric:         Mood and Affect: Mood is depressed.        The patient (or guardian, if applicable) and other individuals in attendance with the patient were advised that Artificial Intelligence will be utilized during this visit to

## 2025-05-05 LAB
BASOPHILS ABSOLUTE: 0.13 X10E3/?L (ref 0–0.3)
BASOPHILS RELATIVE PERCENT: 1.44 % (ref 0–3)
EOSINOPHILS ABSOLUTE: 0.35 X10E3/?L (ref 0–1.1)
EOSINOPHILS RELATIVE PERCENT: 3.75 % (ref 0–10)
HCT VFR BLD CALC: 46 % (ref 37–47)
HEMOGLOBIN: 14.5 G/DL (ref 12–16)
LYMPHOCYTES ABSOLUTE: 3.6 X10E3/?L (ref 1–5.5)
LYMPHOCYTES RELATIVE PERCENT: 38.83 % (ref 20–51.1)
MCH RBC QN AUTO: 27 PG (ref 28.5–32.5)
MCHC RBC AUTO-ENTMCNC: 31.6 G/DL (ref 32–37)
MCV RBC AUTO: 85.6 FL (ref 80–94)
MONOCYTES ABSOLUTE: 0.58 X10E3/?L (ref 0.1–1)
MONOCYTES RELATIVE PERCENT: 6.21 % (ref 1.7–9.3)
NEUTROPHILS ABSOLUTE: 4.62 X10E3/?L (ref 2–8.1)
NEUTROPHILS RELATIVE PERCENT: 49.77 % (ref 42.2–75.2)
PDW BLD-RTO: 14.1 % (ref 8.5–15.5)
PLATELET # BLD: 384.4 THOU/MM3 (ref 130–400)
RBC # BLD: 5.37 M/UL (ref 4.2–5.4)
SED RATE, AUTOMATED: 5 MM/HR (ref 0–20)
WBC # BLD: 9.3 THOU/ML3 (ref 4.8–10.8)

## 2025-05-07 ENCOUNTER — OFFICE VISIT (OUTPATIENT)
Dept: FAMILY MEDICINE CLINIC | Age: 35
End: 2025-05-07
Payer: COMMERCIAL

## 2025-05-07 VITALS
DIASTOLIC BLOOD PRESSURE: 86 MMHG | HEART RATE: 93 BPM | BODY MASS INDEX: 45.99 KG/M2 | SYSTOLIC BLOOD PRESSURE: 124 MMHG | HEIGHT: 67 IN | OXYGEN SATURATION: 98 % | TEMPERATURE: 99.3 F | WEIGHT: 293 LBS

## 2025-05-07 DIAGNOSIS — J01.90 ACUTE BACTERIAL SINUSITIS: Primary | ICD-10-CM

## 2025-05-07 DIAGNOSIS — B96.89 ACUTE BACTERIAL SINUSITIS: Primary | ICD-10-CM

## 2025-05-07 PROCEDURE — 99213 OFFICE O/P EST LOW 20 MIN: CPT | Performed by: NURSE PRACTITIONER

## 2025-05-07 RX ORDER — LEVOFLOXACIN 750 MG/1
750 TABLET, FILM COATED ORAL DAILY
Qty: 7 TABLET | Refills: 0 | Status: SHIPPED | OUTPATIENT
Start: 2025-05-07 | End: 2025-05-14

## 2025-05-07 NOTE — PROGRESS NOTES
times daily.      triamcinolone (KENALOG) 0.025 % cream Apply topically every 4 hours as needed Apply Topically      ipratropium (ATROVENT) 0.06 % nasal spray 2 sprays by Each Nostril route 4 times daily      ammonium lactate (LAC-HYDRIN) 12 % lotion Apply topically as needed for Dry Skin Apply topically as needed.      dicyclomine (BENTYL) 20 MG tablet Take 1 tablet by mouth every 6 hours      meclizine (ANTIVERT) 25 MG tablet Take 1 tablet by mouth 3 times daily as needed      albuterol sulfate HFA (VENTOLIN HFA) 108 (90 Base) MCG/ACT inhaler Inhale 2 puffs into the lungs every 6 hours as needed for Wheezing (Patient not taking: Reported on 1/23/2025)      norgestrel-ethinyl estradiol (CRYSELLE-28) 0.3-30 MG-MCG per tablet Take 1 tablet by mouth daily (Patient not taking: Reported on 5/7/2025)      ibuprofen (ADVIL;MOTRIN) 600 MG tablet Take 1 tablet by mouth every 6 hours as needed for Pain (Patient not taking: Reported on 5/7/2025) 30 tablet 1     No current facility-administered medications for this visit.     Allergies   Allergen Reactions    Aspirin      No ASA due to bleeding disorder- delta gran. Storage lamb disorder    Gabapentin      EXCESSIVE SEDATION    Pregabalin      EXCESSIVE SEDATION (Lyrica)       Review of Systems   Constitutional:  Positive for fatigue.   Respiratory:  Positive for chest tightness and shortness of breath.    Musculoskeletal:  Positive for neck stiffness.   Neurological:  Positive for dizziness and light-headedness.           4/30/2025     3:29 PM 5/9/2024     8:38 AM 3/7/2023     2:03 PM 6/21/2022     2:18 PM 10/18/2021     2:01 PM 7/7/2021     5:00 PM 7/8/2020     4:12 PM   PHQ Scores   PHQ2 Score 6 0 0 6 6 0 1   PHQ9 Score 21 0 7 16 22 0 1     Interpretation of Total Score Depression Severity: 1-4 = Minimal depression, 5-9 = Mild depression, 10-14 = Moderate depression, 15-19 = Moderately severe depression, 20-27 = Severe depression     OBJECTIVE:     Vitals:    05/07/25 1359

## 2025-05-22 ENCOUNTER — HOSPITAL ENCOUNTER (OUTPATIENT)
Age: 35
Setting detail: OUTPATIENT SURGERY
Discharge: HOME | End: 2025-05-22
Attending: INTERNAL MEDICINE | Admitting: INTERNAL MEDICINE
Payer: COMMERCIAL

## 2025-05-22 ENCOUNTER — ANESTHESIA (OUTPATIENT)
Dept: OPERATING ROOM | Age: 35
End: 2025-05-22
Payer: COMMERCIAL

## 2025-05-22 ENCOUNTER — ANESTHESIA EVENT (OUTPATIENT)
Dept: OPERATING ROOM | Age: 35
End: 2025-05-22
Payer: COMMERCIAL

## 2025-05-22 VITALS
RESPIRATION RATE: 16 BRPM | HEART RATE: 57 BPM | SYSTOLIC BLOOD PRESSURE: 119 MMHG | WEIGHT: 293 LBS | DIASTOLIC BLOOD PRESSURE: 81 MMHG | OXYGEN SATURATION: 100 % | BODY MASS INDEX: 45.99 KG/M2 | HEIGHT: 67 IN | TEMPERATURE: 97.3 F

## 2025-05-22 DIAGNOSIS — K21.9 GASTROESOPHAGEAL REFLUX DISEASE WITHOUT ESOPHAGITIS: ICD-10-CM

## 2025-05-22 DIAGNOSIS — E66.01 MORBID OBESITY (HCC): ICD-10-CM

## 2025-05-22 DIAGNOSIS — K44.9 HIATAL HERNIA: ICD-10-CM

## 2025-05-22 LAB — HCG, PREGNANCY URINE (POC): NEGATIVE

## 2025-05-22 PROCEDURE — 3609012400 HC EGD TRANSORAL BIOPSY SINGLE/MULTIPLE: Performed by: INTERNAL MEDICINE

## 2025-05-22 PROCEDURE — 3700000000 HC ANESTHESIA ATTENDED CARE: Performed by: INTERNAL MEDICINE

## 2025-05-22 PROCEDURE — 2580000003 HC RX 258: Performed by: ANESTHESIOLOGY

## 2025-05-22 PROCEDURE — 7100000010 HC PHASE II RECOVERY - FIRST 15 MIN: Performed by: INTERNAL MEDICINE

## 2025-05-22 PROCEDURE — 2709999900 HC NON-CHARGEABLE SUPPLY: Performed by: INTERNAL MEDICINE

## 2025-05-22 PROCEDURE — 3700000001 HC ADD 15 MINUTES (ANESTHESIA): Performed by: INTERNAL MEDICINE

## 2025-05-22 PROCEDURE — 6360000002 HC RX W HCPCS

## 2025-05-22 PROCEDURE — 43239 EGD BIOPSY SINGLE/MULTIPLE: CPT | Performed by: INTERNAL MEDICINE

## 2025-05-22 PROCEDURE — 81025 URINE PREGNANCY TEST: CPT

## 2025-05-22 PROCEDURE — 7100000011 HC PHASE II RECOVERY - ADDTL 15 MIN: Performed by: INTERNAL MEDICINE

## 2025-05-22 PROCEDURE — 88305 TISSUE EXAM BY PATHOLOGIST: CPT

## 2025-05-22 RX ORDER — SODIUM CHLORIDE 9 MG/ML
INJECTION, SOLUTION INTRAVENOUS PRN
Status: DISCONTINUED | OUTPATIENT
Start: 2025-05-22 | End: 2025-05-22 | Stop reason: HOSPADM

## 2025-05-22 RX ORDER — LIDOCAINE HYDROCHLORIDE 10 MG/ML
1 INJECTION, SOLUTION EPIDURAL; INFILTRATION; INTRACAUDAL; PERINEURAL
Status: DISCONTINUED | OUTPATIENT
Start: 2025-05-23 | End: 2025-05-22 | Stop reason: HOSPADM

## 2025-05-22 RX ORDER — SODIUM CHLORIDE 0.9 % (FLUSH) 0.9 %
5-40 SYRINGE (ML) INJECTION PRN
Status: DISCONTINUED | OUTPATIENT
Start: 2025-05-22 | End: 2025-05-22 | Stop reason: HOSPADM

## 2025-05-22 RX ORDER — SODIUM CHLORIDE 0.9 % (FLUSH) 0.9 %
5-40 SYRINGE (ML) INJECTION EVERY 12 HOURS SCHEDULED
Status: DISCONTINUED | OUTPATIENT
Start: 2025-05-22 | End: 2025-05-22 | Stop reason: HOSPADM

## 2025-05-22 RX ORDER — PROPOFOL 10 MG/ML
INJECTION, EMULSION INTRAVENOUS
Status: DISCONTINUED | OUTPATIENT
Start: 2025-05-22 | End: 2025-05-22 | Stop reason: SDUPTHER

## 2025-05-22 RX ORDER — SODIUM CHLORIDE 9 MG/ML
INJECTION, SOLUTION INTRAVENOUS CONTINUOUS
Status: DISCONTINUED | OUTPATIENT
Start: 2025-05-22 | End: 2025-05-22 | Stop reason: HOSPADM

## 2025-05-22 RX ORDER — SODIUM CHLORIDE, SODIUM LACTATE, POTASSIUM CHLORIDE, CALCIUM CHLORIDE 600; 310; 30; 20 MG/100ML; MG/100ML; MG/100ML; MG/100ML
INJECTION, SOLUTION INTRAVENOUS CONTINUOUS
Status: DISCONTINUED | OUTPATIENT
Start: 2025-05-22 | End: 2025-05-22 | Stop reason: HOSPADM

## 2025-05-22 RX ORDER — LIDOCAINE HYDROCHLORIDE 10 MG/ML
INJECTION, SOLUTION EPIDURAL; INFILTRATION; INTRACAUDAL; PERINEURAL
Status: DISCONTINUED | OUTPATIENT
Start: 2025-05-22 | End: 2025-05-22 | Stop reason: SDUPTHER

## 2025-05-22 RX ADMIN — LIDOCAINE HYDROCHLORIDE 100 MG: 10 INJECTION, SOLUTION EPIDURAL; INFILTRATION; INTRACAUDAL; PERINEURAL at 10:57

## 2025-05-22 RX ADMIN — PROPOFOL 100 MG: 10 INJECTION, EMULSION INTRAVENOUS at 11:04

## 2025-05-22 RX ADMIN — PROPOFOL 100 MG: 10 INJECTION, EMULSION INTRAVENOUS at 11:02

## 2025-05-22 RX ADMIN — PROPOFOL 150 MG: 10 INJECTION, EMULSION INTRAVENOUS at 10:59

## 2025-05-22 RX ADMIN — SODIUM CHLORIDE, POTASSIUM CHLORIDE, SODIUM LACTATE AND CALCIUM CHLORIDE: 600; 310; 30; 20 INJECTION, SOLUTION INTRAVENOUS at 10:56

## 2025-05-22 RX ADMIN — PROPOFOL 50 MG: 10 INJECTION, EMULSION INTRAVENOUS at 11:00

## 2025-05-22 ASSESSMENT — ENCOUNTER SYMPTOMS
SHORTNESS OF BREATH: 0
COUGH: 0
WHEEZING: 0
BACK PAIN: 1
CHEST TIGHTNESS: 0
APNEA: 1
SHORTNESS OF BREATH: 0
RHINORRHEA: 0
SORE THROAT: 0

## 2025-05-22 ASSESSMENT — PAIN - FUNCTIONAL ASSESSMENT
PAIN_FUNCTIONAL_ASSESSMENT: 0-10
PAIN_FUNCTIONAL_ASSESSMENT: 0-10

## 2025-05-22 ASSESSMENT — PAIN DESCRIPTION - DESCRIPTORS: DESCRIPTORS: ACHING;BURNING

## 2025-05-22 NOTE — H&P
History and Physical Service   [place]    HISTORY AND PHYSICAL EXAMINATION            Date of Evaluation: 2025  Patient name:  Deanna Sutton  MRN:   [id number]  YOB: 1990  PCP:    Lina Delacruz APRN - CNP    History Obtained From:     Patient, medical records     History of Present Illness:     This is Deanna Sutton a 34 y.o. female who presents today for a ESOPHAGOGASTRODUODENOSCOPY BIOPSY by Cristian Alvarenga MD for Gastroesophageal reflux disease without esophagitis; Morbid obesity (HCC);*. Patient has a history of GERD currently being treated with pantoprazole, which she states helps make her symptoms manageable. She has bowel changes. She reports diarrhea alternating with constipation, nausea, vomiting, epigastric pain, and intermittent blood in her stool, which she states she does have a  history of hemorrhoids. She denies unintentional weight loss. Has had previous EGD. Denies fever, chills, shortness of breath, cough, congestion, wheezing, chest pain, open sores or wounds. Denies hx of diabetes. Denies any current blood thinning medications.     Past Medical History:     Past Medical History:   Diagnosis Date    Acne     Asthma     Attention deficit hyperactivity disorder (ADHD), predominantly inattentive type 2022    Back pain     Cancer (HCC)     cervix,pre-ca.    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 She's had a hemorrhage with a laparoscopy and her cholecystectomy but no PPH with her first . Hematology advised her no aspirin and infusion of 1 unit of platelets 1 hour prior to anticipated delivery.     Anesthesia

## 2025-05-22 NOTE — ANESTHESIA PRE PROCEDURE
Drug/Infectious Status (If Applicable):  Lab Results   Component Value Date/Time    HEPCAB NONREACTIVE 03/17/2023 12:19 PM       COVID-19 Screening (If Applicable):   Lab Results   Component Value Date/Time    COVID19 Not-Detected 01/23/2025 01:56 PM    COVID19 Not Detected 08/04/2020 05:09 PM           Anesthesia Evaluation    Airway: Mallampati: I  TM distance: >3 FB   Neck ROM: full  Mouth opening: > = 3 FB   Dental:          Pulmonary:   (+)     sleep apnea:           (-) shortness of breath                           Cardiovascular:    (+) hypertension:    (-)  angina                Neuro/Psych:               GI/Hepatic/Renal:             Endo/Other:                     Abdominal:             Vascular:          Other Findings:       Anesthesia Plan      MAC     ASA 3                               Patricia Elise MD   5/22/2025

## 2025-05-22 NOTE — ANESTHESIA POSTPROCEDURE EVALUATION
Department of Anesthesiology  Postprocedure Note    Patient: Deanna Sutton  MRN: [id number]  YOB: 1990  Date of evaluation: 5/22/2025    Procedure Summary       Date: 05/22/25 Room / Location: [address]    Anesthesia Start: 1056 Anesthesia Stop: 1119    Procedure: ESOPHAGOGASTRODUODENOSCOPY BIOPSY Diagnosis:       Gastroesophageal reflux disease without esophagitis      Morbid obesity (HCC)      Hiatal hernia      (Gastroesophageal reflux disease without esophagitis [K21.9])      (Morbid obesity [E66.01])      (Hiatal hernia [K44.9])    Surgeons: Cristian Alvarenga MD Responsible Provider: Patricia Elise MD    Anesthesia Type: MAC ASA Status: 3            Anesthesia Type: No value filed.    Zachary Phase I: Zachary Score: 10    Zachary Phase II: Zachary Score: 10    Anesthesia Post Evaluation    Airway patency: patent  Cardiovascular status: hemodynamically stable  Respiratory status: acceptable    No notable events documented.

## 2025-05-22 NOTE — OP NOTE
.PROCEDURE NOTE    DATE OF PROCEDURE: 5/22/2025     SURGEON: Cristian Alvarenga MD    ASSISTANT: None    PREOPERATIVE DIAGNOSIS:   Pre-Op Diagnosis Codes:      * Gastroesophageal reflux disease without esophagitis [K21.9]     * Morbid obesity (HCC) [E66.01]    ABD PAINS  POSTOPERATIVE DIAGNOSIS: As described below    OPERATION: Upper GI endoscopy with Biopsy    ANESTHESIA: MAC PER ANESTHESIA     ESTIMATED BLOOD LOSS: Less than 50 ml    COMPLICATIONS: None.     SPECIMENS:  Was Obtained:     HISTORY: The patient is a 34 y.o. year old female with history of above preop diagnosis.  I recommended esophagogastroduodenoscopy with possible biopsy and I explained the risk, benefits, expected outcome, and alternatives to the procedure.  Risks included but are not limited to bleeding, infection, respiratory distress, hypotension, and perforation of the esophagus, stomach, or duodenum.  Patient understands and is in agreement.    PROCEDURE: The patient was given IV conscious sedation.  The patient's SPO2 remained above 90% throughout the procedure. The gastroscope was inserted orally and advanced under direct vision through the esophagus, through the stomach, through the pylorus, and into the descending duodenum.      Findings:    Retropharyngeal area was grossly normal appearing    Esophagus: MILD IRREGULAR SCJ WAS BIOPSIED AND PICTURES WERE TAKEN  TERTIARY CONTRACTIONS  PATIENT HAD CONSTANT COUGHING   NO OBVIOUS HIATAL HERNIA WAS NOTED  Stomach:    Fundus: normal    Body: normal    Antrum: abnormal: MILD GASTRITIS WAS BIOPSIED FOR H PYLORI     Duodenum:     Descending: normal  RANDOM BIOPSIES TAKEN     Bulb: normal    The scope was removed and the patient tolerated the procedure well.     Recommendations/Plan:   F/U Biopsies  F/U In Office in 3-4 weeks  Discussed with the family  Post sedation patient was stable with stable vital signs and stable O2 saturations    Electronically signed by Cristian Alvarenga MD  on 5/22/2025 at 11:09  AM

## 2025-05-27 ENCOUNTER — TELEPHONE (OUTPATIENT)
Dept: GASTROENTEROLOGY | Age: 35
End: 2025-05-27

## 2025-05-27 DIAGNOSIS — K21.9 GASTRO-ESOPHAGEAL REFLUX DISEASE WITHOUT ESOPHAGITIS: ICD-10-CM

## 2025-05-27 LAB — SURGICAL PATHOLOGY REPORT: NORMAL

## 2025-05-27 RX ORDER — PANTOPRAZOLE SODIUM 40 MG/1
40 TABLET, DELAYED RELEASE ORAL 2 TIMES DAILY
Qty: 180 TABLET | Refills: 1 | Status: SHIPPED | OUTPATIENT
Start: 2025-05-27

## 2025-05-27 NOTE — TELEPHONE ENCOUNTER
Deanna Sutton is requesting a refill on the following medication(s):  Requested Prescriptions     Pending Prescriptions Disp Refills    pantoprazole (PROTONIX) 40 MG tablet [Pharmacy Med Name: Pantoprazole Sodium 40 MG Oral Tablet Delayed Release] 180 tablet 0     Sig: Take 1 tablet by mouth twice daily       Last Visit Date (If Applicable):  5/7/2025      Next Visit Date:    Visit date not found

## 2025-05-27 NOTE — TELEPHONE ENCOUNTER
Called Pt back per Marina, Pt was advise to got to the Er. Pt states she is having side effects from the surgery. Pt states she is going [facility], this is closer to her.    Thank You.

## 2025-06-02 ENCOUNTER — OFFICE VISIT (OUTPATIENT)
Dept: GASTROENTEROLOGY | Age: 35
End: 2025-06-02
Payer: COMMERCIAL

## 2025-06-02 VITALS — BODY MASS INDEX: 52.94 KG/M2 | WEIGHT: 293 LBS | SYSTOLIC BLOOD PRESSURE: 147 MMHG | DIASTOLIC BLOOD PRESSURE: 104 MMHG

## 2025-06-02 DIAGNOSIS — K58.2 IRRITABLE BOWEL SYNDROME WITH BOTH CONSTIPATION AND DIARRHEA: ICD-10-CM

## 2025-06-02 DIAGNOSIS — Z86.0100 HISTORY OF COLON POLYPS: ICD-10-CM

## 2025-06-02 DIAGNOSIS — K62.5 RECTAL BLEEDING: ICD-10-CM

## 2025-06-02 DIAGNOSIS — E66.01 MORBID OBESITY (HCC): ICD-10-CM

## 2025-06-02 DIAGNOSIS — K44.9 HIATAL HERNIA: ICD-10-CM

## 2025-06-02 DIAGNOSIS — Z80.0 FAMILY HISTORY OF COLON CANCER: ICD-10-CM

## 2025-06-02 DIAGNOSIS — K21.9 GASTRO-ESOPHAGEAL REFLUX DISEASE WITHOUT ESOPHAGITIS: ICD-10-CM

## 2025-06-02 DIAGNOSIS — K21.9 GASTROESOPHAGEAL REFLUX DISEASE WITHOUT ESOPHAGITIS: Primary | ICD-10-CM

## 2025-06-02 PROCEDURE — G8427 DOCREV CUR MEDS BY ELIG CLIN: HCPCS | Performed by: INTERNAL MEDICINE

## 2025-06-02 PROCEDURE — 1036F TOBACCO NON-USER: CPT | Performed by: INTERNAL MEDICINE

## 2025-06-02 PROCEDURE — G8417 CALC BMI ABV UP PARAM F/U: HCPCS | Performed by: INTERNAL MEDICINE

## 2025-06-02 PROCEDURE — 99214 OFFICE O/P EST MOD 30 MIN: CPT | Performed by: INTERNAL MEDICINE

## 2025-06-02 RX ORDER — BACILLUS COAGULANS 1B CELL
1 CAPSULE ORAL ONCE
Qty: 90 EACH | Refills: 2 | Status: SHIPPED | OUTPATIENT
Start: 2025-06-02 | End: 2025-06-02

## 2025-06-02 ASSESSMENT — ENCOUNTER SYMPTOMS
CHOKING: 0
BLOOD IN STOOL: 0
VOMITING: 1
ANAL BLEEDING: 0
COUGH: 1
CONSTIPATION: 0
ABDOMINAL PAIN: 1
VOICE CHANGE: 0
RECTAL PAIN: 0
WHEEZING: 0
SHORTNESS OF BREATH: 0
NAUSEA: 1
TROUBLE SWALLOWING: 0
ABDOMINAL DISTENTION: 0
SORE THROAT: 1
DIARRHEA: 0

## 2025-06-02 NOTE — PROGRESS NOTES
GI CLINIC FOLLOW UP    NTERVAL HISTORY:   No referring provider defined for this encounter.    Chief Complaint   Patient presents with    Results     Patient is here today for a f/u from EGD procedure completed on 5/22/25.       1. Gastroesophageal reflux disease without esophagitis    2. Gastro-esophageal reflux disease without esophagitis    3. Morbid obesity (HCC)    4. Hiatal hernia    5. Rectal bleeding    6. Family history of colon cancer    7. Irritable bowel syndrome with both constipation and diarrhea    8. History of colon polyps       The patient is here as a follow up of her recent GI procedure.   The results have been sent to you separately   The findings were explained to the patient in detail and biopsies were also discussed   with her    This patient recent upper endoscopy done by me distal esophagitis was noted she had constant coughing no obvious hiatal hernia was noted gastritis with biopsies no evidence of H. pylori were noted biopsies from the small bowel were negative for celiac sprue    She is still complaining of some nausea issues    No hematemesis    Complains of rectal bleeding  Has family history for colon cancer?  History for colon polyps    Patient has been complaining of some abdominal pains, off and on cramping  Also complains of abdominal bloating and gas  Has off and on nausea without any sig vomiting  Has some alternating constipation and diarrhea  Has no weight loss  Has some anxiety issues    Has morbid obesity not able to lose any weight apparently has seen dietitian in the past she has PCOS    History for endometriosis has been seen and followed by OB/GYN      HISTORY OF PRESENT ILLNESS: Ms.Latasha Sutton is a 34 y.o. female with a past history remarkable for , referred for evaluation of   Chief Complaint   Patient presents with    Results     Patient is here today for a f/u from EGD procedure completed on 5/22/25.   .    Past Medical,Family, and Social History

## 2025-06-03 ENCOUNTER — PREP FOR PROCEDURE (OUTPATIENT)
Dept: GASTROENTEROLOGY | Age: 35
End: 2025-06-03

## 2025-06-03 DIAGNOSIS — K58.2 IRRITABLE BOWEL SYNDROME WITH CONSTIPATION AND DIARRHEA: ICD-10-CM

## 2025-06-03 DIAGNOSIS — Z12.11 COLON CANCER SCREENING: Primary | ICD-10-CM

## 2025-06-03 RX ORDER — BISACODYL 5 MG
TABLET, DELAYED RELEASE (ENTERIC COATED) ORAL
Qty: 4 TABLET | Refills: 0 | Status: SHIPPED | OUTPATIENT
Start: 2025-06-03

## 2025-06-03 RX ORDER — POLYETHYLENE GLYCOL 3350 17 G/17G
POWDER, FOR SOLUTION ORAL
Qty: 238 G | Refills: 0 | Status: SHIPPED | OUTPATIENT
Start: 2025-06-03

## 2025-06-03 NOTE — TELEPHONE ENCOUNTER
Procedure scheduled/Dr CLARENCE Alvarenga  Procedure: colon   Dx:     Gastroesophageal reflux disease without esophagitis     2. Gastro-esophageal reflux disease without esophagitis    3. Morbid obesity (HCC)    4. Hiatal hernia    5. Rectal bleeding    6. Family history of colon cancer    7. Irritable bowel syndrome with both constipation and diarrhea    8. History of colon polyps      Date: 11/26/25  Time: 10:15 a.m.  Hospital: [facility]   Bowel Prep instructions given: Miralax dulco  In office/via phone: office  Clearance needed: no   GLP - 1: N/A    The patient was informed that any cancellations/reschedules for procedures will need to be done no later than one week prior.  If less than one week prior an office visit will be needed in order to discuss being rescheduled.  All no shows to procedures will need an office visit prior as well.  Per SARANYA Alvarenga.

## 2025-06-16 ENCOUNTER — TELEMEDICINE (OUTPATIENT)
Dept: FAMILY MEDICINE CLINIC | Age: 35
End: 2025-06-16
Payer: COMMERCIAL

## 2025-06-16 DIAGNOSIS — J32.4 CHRONIC PANSINUSITIS: Primary | ICD-10-CM

## 2025-06-16 DIAGNOSIS — J34.1 MUCOUS RETENTION CYST OF MAXILLARY SINUS: ICD-10-CM

## 2025-06-16 PROCEDURE — G8427 DOCREV CUR MEDS BY ELIG CLIN: HCPCS | Performed by: NURSE PRACTITIONER

## 2025-06-16 PROCEDURE — 99213 OFFICE O/P EST LOW 20 MIN: CPT | Performed by: NURSE PRACTITIONER

## 2025-06-16 PROCEDURE — 1036F TOBACCO NON-USER: CPT | Performed by: NURSE PRACTITIONER

## 2025-06-16 PROCEDURE — G8417 CALC BMI ABV UP PARAM F/U: HCPCS | Performed by: NURSE PRACTITIONER

## 2025-06-16 NOTE — PROGRESS NOTES
[address], [address]  [place], [place] [address]  Dept: [phone]  Dept Fax: [phone]    History and Physical  Patient:  Deanna Sutton  YOB: 1990  Date of Service:  2025    TELEHEALTH EVALUATION -- Audio/Visual    Deanna Sutton (:  1990) has requested an audio/video evaluation for the following concern(s):    History of Present Illness  The patient presents via virtual visit for evaluation of a sinus infection and elevated blood pressure.    They have been experiencing persistent sinus pressure, which has escalated in severity. Despite the resolution of their cough and congestion, the sinus pressure remains. They sought medical attention at an urgent care facility where they were prescribed amoxicillin-clavulanate for 10 days and prednisone with a tapering dose schedule. However, after 5 to 6 days of treatment, they continue to experience low-grade fevers, worsening headaches, and significant dizziness. The fever subsides with Tylenol but recurs either later in the day or the following morning. They also report drying mucus, severe sinus pressure, and debilitating headaches.     They have a history of seeing an ENT specialist, Dr. Barragan, in [place], but have not had a recent consultation. They have been using over-the-counter Tylenol and Excedrin, along with prescribed allergy medication and nasal sprays. They have attempted steam inhalation and nasal flushing, but these have resulted in ear clogging. Their dizziness is so severe that it has led to a fall in the shower and difficulty walking. They have been unable to work since the previous week due to their symptoms and are uncertain about their ability to return to work. They were also given meclizine for their dizziness.    Additionally, they report elevated blood pressure readings during their last few appointments, which is unusual for them as they typically only experience

## 2025-06-23 ENCOUNTER — OFFICE VISIT (OUTPATIENT)
Dept: FAMILY MEDICINE CLINIC | Age: 35
End: 2025-06-23
Payer: COMMERCIAL

## 2025-06-23 VITALS
DIASTOLIC BLOOD PRESSURE: 80 MMHG | WEIGHT: 293 LBS | OXYGEN SATURATION: 98 % | BODY MASS INDEX: 52 KG/M2 | SYSTOLIC BLOOD PRESSURE: 124 MMHG | HEART RATE: 102 BPM

## 2025-06-23 DIAGNOSIS — J32.4 CHRONIC PANSINUSITIS: Primary | ICD-10-CM

## 2025-06-23 DIAGNOSIS — J45.20 MILD INTERMITTENT ASTHMA WITHOUT COMPLICATION: ICD-10-CM

## 2025-06-23 PROCEDURE — G8417 CALC BMI ABV UP PARAM F/U: HCPCS | Performed by: NURSE PRACTITIONER

## 2025-06-23 PROCEDURE — 1036F TOBACCO NON-USER: CPT | Performed by: NURSE PRACTITIONER

## 2025-06-23 PROCEDURE — 99213 OFFICE O/P EST LOW 20 MIN: CPT | Performed by: NURSE PRACTITIONER

## 2025-06-23 PROCEDURE — G8427 DOCREV CUR MEDS BY ELIG CLIN: HCPCS | Performed by: NURSE PRACTITIONER

## 2025-06-30 DIAGNOSIS — R10.84 GENERALIZED ABDOMINAL PAIN: ICD-10-CM

## 2025-06-30 DIAGNOSIS — R11.0 NAUSEA: ICD-10-CM

## 2025-06-30 RX ORDER — METOCLOPRAMIDE 5 MG/1
5 TABLET ORAL 3 TIMES DAILY
Qty: 90 TABLET | Refills: 1 | Status: SHIPPED | OUTPATIENT
Start: 2025-06-30

## 2025-06-30 RX ORDER — ALBUTEROL SULFATE 90 UG/1
2 INHALANT RESPIRATORY (INHALATION) EVERY 4 HOURS PRN
Qty: 18 G | Refills: 0 | Status: SHIPPED | OUTPATIENT
Start: 2025-06-30

## 2025-06-30 NOTE — TELEPHONE ENCOUNTER
Deanna Sutton is requesting a refill on the following medication(s):  Requested Prescriptions     Pending Prescriptions Disp Refills    metoclopramide (REGLAN) 5 MG tablet [Pharmacy Med Name: Metoclopramide HCl 5 MG Oral Tablet] 90 tablet 0     Sig: TAKE 1 TABLET BY MOUTH THREE TIMES DAILY       Last Visit Date (If Applicable):  6/23/2025    Next Visit Date:    Visit date not found

## 2025-06-30 NOTE — PROGRESS NOTES
[place] [id number] [facility], [address]  [place], [place] [address]  Dept: [phone]  Dept Fax: [phone]    Date of Service:  6/23/2025    Deanna Sutton is a 34 y.o. female who presents today for her medical conditions/complaints as noted below.      Chief Complaint   Patient presents with    Follow-up     Sinus pressure not any better yet, mucus did clear but but still having headaches and dizziness.      BP f/u      DIAGNOSIS / PLAN:     Assessment & Plan  1. Sinus pressure: Acute.  - Maintain adequate rest, hydration, and use a humidifier.  - No further treatment needed at this time as symptoms are gradually improving.    2. Elevated blood pressure: Stable.  - Blood pressure well controlled in the office today at 124/80.  - Continue monitoring blood pressure at home.  - Watch salt intake.    3. Tinnitus: Intermittent.  - Referral to ENT specialist for further evaluation.    4. Diarrhea: Acute.  - 2-3 episodes per day.  - No fever present.  - Maintain hydration.    5. Medication management.  - Refill for albuterol inhaler.    Follow-up  - Referral to ENT specialist.       Chronic pansinusitis  -     External Referral To Pediatric ENT  Mild intermittent asthma without complication  -     albuterol sulfate HFA (PROVENTIL;VENTOLIN;PROAIR) 108 (90 Base) MCG/ACT inhaler; Inhale 2 puffs into the lungs every 4 hours as needed for Wheezing or Shortness of Breath, Disp-18 g, R-0Normal       Addressed all of the patient's questions, and the patient expressed clear understanding. The patient agreed with the proposed treatment plan. Follow-up scheduled as directed.    Return if symptoms worsen or fail to improve.    SUBJECTIVE:     History of Present Illness  The patient presents to the office complaining of sinus pressure, which has not improved. They report that the mucus has cleared, but they are still experiencing facial pressure, headaches, and dizziness. They have completed their

## 2025-07-01 ENCOUNTER — OFFICE VISIT (OUTPATIENT)
Dept: FAMILY MEDICINE CLINIC | Age: 35
End: 2025-07-01
Payer: COMMERCIAL

## 2025-07-01 VITALS
BODY MASS INDEX: 52 KG/M2 | HEART RATE: 94 BPM | WEIGHT: 293 LBS | OXYGEN SATURATION: 98 % | DIASTOLIC BLOOD PRESSURE: 106 MMHG | SYSTOLIC BLOOD PRESSURE: 146 MMHG | TEMPERATURE: 99 F

## 2025-07-01 DIAGNOSIS — J01.41 ACUTE RECURRENT PANSINUSITIS: Primary | ICD-10-CM

## 2025-07-01 DIAGNOSIS — F90.2 ATTENTION DEFICIT HYPERACTIVITY DISORDER (ADHD), COMBINED TYPE: ICD-10-CM

## 2025-07-01 PROCEDURE — G8417 CALC BMI ABV UP PARAM F/U: HCPCS | Performed by: NURSE PRACTITIONER

## 2025-07-01 PROCEDURE — 99212 OFFICE O/P EST SF 10 MIN: CPT | Performed by: NURSE PRACTITIONER

## 2025-07-01 PROCEDURE — G8427 DOCREV CUR MEDS BY ELIG CLIN: HCPCS | Performed by: NURSE PRACTITIONER

## 2025-07-01 PROCEDURE — 99213 OFFICE O/P EST LOW 20 MIN: CPT | Performed by: NURSE PRACTITIONER

## 2025-07-01 PROCEDURE — 1036F TOBACCO NON-USER: CPT | Performed by: NURSE PRACTITIONER

## 2025-07-01 RX ORDER — LEVOFLOXACIN 750 MG/1
750 TABLET, FILM COATED ORAL DAILY
Qty: 7 TABLET | Refills: 0 | Status: SHIPPED | OUTPATIENT
Start: 2025-07-01 | End: 2025-07-08

## 2025-07-01 RX ORDER — LISDEXAMFETAMINE DIMESYLATE 20 MG/1
20 CAPSULE ORAL DAILY
Qty: 30 CAPSULE | Refills: 0 | Status: SHIPPED | OUTPATIENT
Start: 2025-07-01 | End: 2025-07-31

## 2025-07-01 NOTE — PROGRESS NOTES
[address], [address]  [place] [id number]  Dept: [phone]  Dept Fax: [phone]    Date of Service:  7/1/2025    Deanna Sutton is a 34 y.o. female who presents today for her medical conditions/complaints as noted below.      Chief Complaint   Patient presents with    Sinus Problem     C/o sinus pressure, ha, still has not heard from ENT about referral      DIAGNOSIS / PLAN:     Assessment & Plan  1. Sinusitis: Acute.  - Symptoms include waking up congested, sinus pain, colored mucus, headaches, sinus pressure, nausea, and blurry vision.  - Blood pressure is elevated. Excedrin taken with limited relief.  - Prescription for levofloxacin 750 mg once daily for 1 week provided and sent to the   - ENT consultation recommended for further evaluation.  - Maintain adequate hydration and rest.  - Blood pressure to be rechecked by the nurse.    2. Attention deficit hyperactivity disorder (ADHD).  - Current medication, dexmethylphenidate ER 20 mg, causing muscle tension and twitching and is not effective.  - Prescription for Vyvanse 20 mg provided as an alternative treatment.  - Start on a low dose and monitor for effectiveness and side effects.    Follow-up  - Check with Supriya upfront to call ENT for appointment confirmation.       Acute recurrent pansinusitis  -     levoFLOXacin (LEVAQUIN) 750 MG tablet; Take 1 tablet by mouth daily for 7 days, Disp-7 tablet, R-0Normal  Attention deficit hyperactivity disorder (ADHD), combined type  -     Lisdexamfetamine Dimesylate (VYVANSE) 20 MG CAPS; Take 1 capsule by mouth daily for 30 days. Max Daily Amount: 20 mg, Disp-30 capsule, R-0Normal       Discussed the purpose, benefits, and potential side effects of the prescribed medications in detail. Addressed all of the patient's questions.     The patient agreed with the proposed treatment plan.     No follow-ups on file.    SUBJECTIVE:     History of Present Illness  The patient

## 2025-07-02 ENCOUNTER — PATIENT MESSAGE (OUTPATIENT)
Dept: FAMILY MEDICINE CLINIC | Age: 35
End: 2025-07-02

## 2025-07-28 DIAGNOSIS — J30.9 ALLERGIC RHINITIS, UNSPECIFIED SEASONALITY, UNSPECIFIED TRIGGER: ICD-10-CM

## 2025-07-28 RX ORDER — CETIRIZINE HYDROCHLORIDE 10 MG/1
10 TABLET ORAL DAILY
Qty: 90 TABLET | Refills: 3 | Status: SHIPPED | OUTPATIENT
Start: 2025-07-28

## 2025-07-28 NOTE — TELEPHONE ENCOUNTER
Deanna Sutton is requesting a refill on the following medication(s):  Requested Prescriptions     Pending Prescriptions Disp Refills    cetirizine (ZYRTEC) 10 MG tablet [Pharmacy Med Name: Cetirizine HCl 10 MG Oral Tablet] 90 tablet 0     Sig: Take 1 tablet by mouth once daily       Last Visit Date (If Applicable):  4/23/2025      Next Visit Date:    Visit date not found

## 2025-08-07 DIAGNOSIS — F90.2 ATTENTION DEFICIT HYPERACTIVITY DISORDER (ADHD), COMBINED TYPE: ICD-10-CM

## 2025-08-07 RX ORDER — LISDEXAMFETAMINE DIMESYLATE 20 MG/1
20 CAPSULE ORAL DAILY
Qty: 30 CAPSULE | Refills: 0 | Status: SHIPPED | OUTPATIENT
Start: 2025-08-07 | End: 2025-09-06

## (undated) DEVICE — Z DISCONTINUED USE 2624852 GLOVE SURG 7 PF TEXT NEOPRNE BRN STRL NEOLON 2G LF

## (undated) DEVICE — SUTURE VCRL SZ 0 L36IN ABSRB UD L36MM CT-1 1/2 CIR J946H

## (undated) DEVICE — BITE BLOCK W/VELCRO STRAP

## (undated) DEVICE — 450 ML BOTTLE OF 0.05% CHLORHEXIDINE GLUCONATE IN 99.95% STERILE WATER FOR IRRIGATION, USP AND APPLICATOR.: Brand: IRRISEPT ANTIMICROBIAL WOUND LAVAGE

## (undated) DEVICE — 1200CC GUARDIAN II: Brand: GUARDIAN

## (undated) DEVICE — SOLUTION SOD CHL 0.9% 1000ML

## (undated) DEVICE — CONNECTOR TBNG AUX H2O JET DISP FOR OLY 160/180 SER

## (undated) DEVICE — KENDALL SCD EXPRESS SLEEVES, KNEE LENGTH, MEDIUM: Brand: KENDALL SCD

## (undated) DEVICE — TRAY SPNL 24GA L4IN PENCAN PNCL PNT NDL 0.75% BIPIVCAIN W/

## (undated) DEVICE — PREVENA INCISION MANAGEMENT SYSTEM- PEEL & PLACE DRESSING: Brand: PREVENA™ PEEL & PLACE™

## (undated) DEVICE — FORCEPS BX L240CM JAW DIA2.8MM L CAP W/ NDL MIC MESH TOOTH

## (undated) DEVICE — Device

## (undated) DEVICE — GLOVE ORTHO 8   MSG9480

## (undated) DEVICE — TOWEL,OR,DSP,ST,BLUE,DLX,XR,4/PK,20PK/CS: Brand: MEDLINE

## (undated) DEVICE — SOLUTION IV IRRIG WATER 500ML POUR BRL ST 2F7113

## (undated) DEVICE — STAZ ENDO KIT: Brand: MEDLINE INDUSTRIES, INC.

## (undated) DEVICE — 3M™ STERI-STRIP™ ANTIMICROBIAL SKIN CLOSURES 1 IN X 5 IN, 25/CAR, 4 CAR/CASE A1848: Brand: 3M™ STERI-STRIP™

## (undated) DEVICE — SUTURE VCRL SZ 4-0 L18IN ABSRB UD L19MM PS-2 3/8 CIR PRIM J496H

## (undated) DEVICE — LINE SAMP O2 6.5FT W/FEMALE CONN F/ADULT CAPNOLINE PLUS

## (undated) DEVICE — BITE BLOCK ENDOSCP AD 60 FR W/ ADJ STRP PLAS GRN BLOX

## (undated) DEVICE — TOWEL SURG W16XL26IN WHT NONFENESTRATED ST 2 PER PK

## (undated) DEVICE — SUTURE COAT VCRL SZ 0 L36IN ABSRB VLT CTX L48MM TAPERPOINT J370H

## (undated) DEVICE — DISCONTINUED USE 419147 KIT ENDOSCOPY CUSTOM PACK

## (undated) DEVICE — STERILE POLYISOPRENE POWDER-FREE SURGICAL GLOVES WITH EMOLLIENT COATING: Brand: PROTEXIS

## (undated) DEVICE — Z DISCONTINUED USE 2711661 SWAB MEDICATED TINC BENZ